# Patient Record
Sex: MALE | Race: WHITE | Employment: OTHER
[De-identification: names, ages, dates, MRNs, and addresses within clinical notes are randomized per-mention and may not be internally consistent; named-entity substitution may affect disease eponyms.]

---

## 2017-02-23 PROBLEM — I50.42 CHRONIC COMBINED SYSTOLIC AND DIASTOLIC CONGESTIVE HEART FAILURE (HCC): Chronic | Status: ACTIVE | Noted: 2017-02-23

## 2017-02-23 PROBLEM — E16.2 HYPOGLYCEMIA: Status: ACTIVE | Noted: 2017-02-23

## 2017-05-11 PROBLEM — J96.20 ACUTE ON CHRONIC RESPIRATORY FAILURE (HCC): Status: ACTIVE | Noted: 2017-05-11

## 2017-05-12 PROBLEM — I50.43 ACUTE ON CHRONIC COMBINED SYSTOLIC AND DIASTOLIC CHF (CONGESTIVE HEART FAILURE) (HCC): Chronic | Status: ACTIVE | Noted: 2017-02-23

## 2017-06-08 PROBLEM — E16.2 HYPOGLYCEMIA: Status: RESOLVED | Noted: 2017-02-23 | Resolved: 2017-06-08

## 2017-08-26 PROBLEM — Z45.02 ICD (IMPLANTABLE CARDIOVERTER-DEFIBRILLATOR) DISCHARGE: Status: ACTIVE | Noted: 2017-08-26

## 2017-11-20 ENCOUNTER — TELEPHONE (OUTPATIENT)
Dept: CASE MANAGEMENT | Age: 77
End: 2017-11-20

## 2017-11-20 NOTE — LETTER
CT Lung Screening/ Lung Nodule Program  [address] [place] [address]. [place], [address]       TO:   Rosa Dutta MD    FAX:   [phone]        FROM:   Chacha Hanks, [address] [facility] Screening [facility]    DATE:   11/20/2017    PHONE:  [phone]    FAX:    [phone]      Comments:  Incidental Pulmonary Nodule Notification    RE:  Sabi Shane  1940        The information contained in this fax message is intended only for Personal and Confidential use of the designated recipient(s) names above. This information is to be handled as Privileged and Confidential. If the reader of this message is not the intended recipient, you are hereby notified that you have received this document in error, and that any review, dissemination, distribution, or copying of this message is strictly prohibited. If you receive this communication in error, please notify us immediately at the above number and return the original message to us by mail. Thank you. Member of [facility]. Medical Imaging Services                              11/20/2017           Rosa Dutta, [address] [address]  Fax: [phone]      Dear  Rosa Dutta MD :                                      Your patient, Sabi Shane (1940) had an imaging study (11/13/17) that noted an incidental pulmonary nodule/s. Follow up was recommended according to [place] for Management of Pulmonary Nodules If this patient is not currently active within your practice, please contact us immediately so we can update records. This patient will not be monitored by our incidental pulmonary nodule program this letter serves as a notification only. If you have any additional questions or concerns regarding our Lung Nodule Program or our CT Lung Cancer Screening Program,  please don't hesitate to call.             Sincerely, Pulmonary Nodule Program  [address]:  [phone]  F:  [phone]                            Medical Imaging Services            11/20/2017      Federico Mortimer  [address]  [address] [facility]              Dear Paddy Beltran,    [facility] records indicate that over the past year you had an imaging study/studies done while a patient at a [facility] facility. Because your health is our primary concern, we want to make sure we have the correct primary care physician on file. We currently have Ginna Ziegler MD noted as your primary care physician. If this information is not accurate, please call [phone] and provide us with the correct information. If this information is correct, please make sure you have discussed your visit with your physician and understand all results and any follow up recommendations that may or may not be needed now or in the future.            Sincerely,    [address]:  [phone]  F:  [phone]

## 2018-01-01 ENCOUNTER — OFFICE VISIT (OUTPATIENT)
Dept: NON INVASIVE DIAGNOSTICS | Age: 78
End: 2018-01-01
Payer: MEDICARE

## 2018-01-01 ENCOUNTER — APPOINTMENT (OUTPATIENT)
Dept: CT IMAGING | Age: 78
DRG: 871 | End: 2018-01-01
Payer: MEDICARE

## 2018-01-01 ENCOUNTER — TELEPHONE (OUTPATIENT)
Dept: NON INVASIVE DIAGNOSTICS | Age: 78
End: 2018-01-01

## 2018-01-01 ENCOUNTER — APPOINTMENT (OUTPATIENT)
Dept: ULTRASOUND IMAGING | Age: 78
DRG: 871 | End: 2018-01-01
Payer: MEDICARE

## 2018-01-01 ENCOUNTER — HOSPITAL ENCOUNTER (OUTPATIENT)
Age: 78
Setting detail: OUTPATIENT SURGERY
Discharge: HOME | DRG: 982 | End: 2018-11-09
Attending: UROLOGY | Admitting: UROLOGY
Payer: MEDICARE

## 2018-01-01 ENCOUNTER — HOSPITAL ENCOUNTER (INPATIENT)
Age: 78
LOS: 6 days | Discharge: HOME HEALTH | DRG: 687 | End: 2018-11-20
Attending: EMERGENCY MEDICINE | Admitting: INTERNAL MEDICINE
Payer: MEDICARE

## 2018-01-01 ENCOUNTER — ANESTHESIA (OUTPATIENT)
Dept: OPERATING ROOM | Age: 78
DRG: 982 | End: 2018-01-01
Payer: MEDICARE

## 2018-01-01 ENCOUNTER — HOSPITAL ENCOUNTER (INPATIENT)
Age: 78
LOS: 9 days | Discharge: NURSING, REHAB OR LONG-TERM CARE | DRG: 813 | End: 2018-10-12
Attending: EMERGENCY MEDICINE | Admitting: INTERNAL MEDICINE
Payer: MEDICARE

## 2018-01-01 ENCOUNTER — APPOINTMENT (OUTPATIENT)
Dept: GENERAL RADIOLOGY | Age: 78
DRG: 687 | End: 2018-01-01
Payer: MEDICARE

## 2018-01-01 ENCOUNTER — APPOINTMENT (OUTPATIENT)
Dept: GENERAL RADIOLOGY | Age: 78
DRG: 813 | End: 2018-01-01
Payer: MEDICARE

## 2018-01-01 ENCOUNTER — HOSPITAL ENCOUNTER (OUTPATIENT)
Age: 78
Setting detail: OUTPATIENT SURGERY
Discharge: HOME | End: 2018-06-08
Attending: UROLOGY | Admitting: UROLOGY
Payer: MEDICARE

## 2018-01-01 ENCOUNTER — TELEPHONE (OUTPATIENT)
Dept: OTHER | Facility: CLINIC | Age: 78
End: 2018-01-01

## 2018-01-01 ENCOUNTER — APPOINTMENT (OUTPATIENT)
Dept: GENERAL RADIOLOGY | Age: 78
DRG: 871 | End: 2018-01-01
Payer: MEDICARE

## 2018-01-01 ENCOUNTER — OFFICE VISIT (OUTPATIENT)
Dept: CARDIOLOGY CLINIC | Age: 78
End: 2018-01-01
Payer: MEDICARE

## 2018-01-01 ENCOUNTER — APPOINTMENT (OUTPATIENT)
Dept: GENERAL RADIOLOGY | Age: 78
DRG: 982 | End: 2018-01-01
Payer: MEDICARE

## 2018-01-01 ENCOUNTER — HOSPITAL ENCOUNTER (INPATIENT)
Age: 78
LOS: 6 days | Discharge: NURSING, REHAB OR LONG-TERM CARE | DRG: 871 | End: 2018-12-11
Attending: EMERGENCY MEDICINE | Admitting: INTERNAL MEDICINE
Payer: MEDICARE

## 2018-01-01 ENCOUNTER — APPOINTMENT (OUTPATIENT)
Dept: GENERAL RADIOLOGY | Age: 78
DRG: 689 | End: 2018-01-01
Payer: MEDICARE

## 2018-01-01 ENCOUNTER — APPOINTMENT (OUTPATIENT)
Dept: CT IMAGING | Age: 78
DRG: 813 | End: 2018-01-01
Payer: MEDICARE

## 2018-01-01 ENCOUNTER — HOSPITAL ENCOUNTER (INPATIENT)
Age: 78
LOS: 2 days | Discharge: HOME HEALTH | DRG: 982 | End: 2018-11-12
Attending: EMERGENCY MEDICINE | Admitting: INTERNAL MEDICINE
Payer: MEDICARE

## 2018-01-01 ENCOUNTER — HOSPITAL ENCOUNTER (OUTPATIENT)
Dept: GENERAL RADIOLOGY | Age: 78
Discharge: HOME | DRG: 982 | End: 2018-11-11
Attending: UROLOGY
Payer: MEDICARE

## 2018-01-01 ENCOUNTER — TELEPHONE (OUTPATIENT)
Dept: ADMINISTRATIVE | Age: 78
End: 2018-01-01

## 2018-01-01 ENCOUNTER — HOSPITAL ENCOUNTER (INPATIENT)
Age: 78
LOS: 3 days | Discharge: HOME | DRG: 683 | End: 2018-04-24
Attending: EMERGENCY MEDICINE | Admitting: INTERNAL MEDICINE
Payer: MEDICARE

## 2018-01-01 ENCOUNTER — HOSPITAL ENCOUNTER (EMERGENCY)
Age: 78
Discharge: OTHER INSTITUTION | End: 2018-12-31
Attending: EMERGENCY MEDICINE
Payer: MEDICARE

## 2018-01-01 ENCOUNTER — APPOINTMENT (OUTPATIENT)
Dept: GENERAL RADIOLOGY | Age: 78
DRG: 291 | End: 2018-01-01
Payer: MEDICARE

## 2018-01-01 ENCOUNTER — ANESTHESIA (OUTPATIENT)
Dept: OPERATING ROOM | Age: 78
End: 2018-01-01
Payer: MEDICARE

## 2018-01-01 ENCOUNTER — NURSE ONLY (OUTPATIENT)
Dept: NON INVASIVE DIAGNOSTICS | Age: 78
End: 2018-01-01
Payer: MEDICARE

## 2018-01-01 ENCOUNTER — HOSPITAL ENCOUNTER (INPATIENT)
Age: 78
LOS: 6 days | Discharge: NURSING, REHAB OR LONG-TERM CARE | DRG: 291 | End: 2018-12-29
Attending: EMERGENCY MEDICINE | Admitting: INTERNAL MEDICINE
Payer: MEDICARE

## 2018-01-01 ENCOUNTER — APPOINTMENT (OUTPATIENT)
Dept: GENERAL RADIOLOGY | Age: 78
DRG: 193 | End: 2018-01-01
Payer: MEDICARE

## 2018-01-01 ENCOUNTER — HOSPITAL ENCOUNTER (INPATIENT)
Age: 78
LOS: 5 days | Discharge: NURSING, REHAB OR LONG-TERM CARE | DRG: 689 | End: 2018-11-28
Attending: EMERGENCY MEDICINE | Admitting: INTERNAL MEDICINE
Payer: MEDICARE

## 2018-01-01 ENCOUNTER — ANESTHESIA EVENT (OUTPATIENT)
Dept: OPERATING ROOM | Age: 78
End: 2018-01-01
Payer: MEDICARE

## 2018-01-01 ENCOUNTER — HOSPITAL ENCOUNTER (INPATIENT)
Age: 78
LOS: 4 days | Discharge: HOME HEALTH | DRG: 699 | End: 2018-06-20
Attending: EMERGENCY MEDICINE | Admitting: UROLOGY
Payer: MEDICARE

## 2018-01-01 ENCOUNTER — APPOINTMENT (OUTPATIENT)
Dept: ULTRASOUND IMAGING | Age: 78
DRG: 813 | End: 2018-01-01
Payer: MEDICARE

## 2018-01-01 ENCOUNTER — HOSPITAL ENCOUNTER (INPATIENT)
Age: 78
LOS: 3 days | Discharge: HOME | DRG: 193 | End: 2018-04-16
Attending: EMERGENCY MEDICINE | Admitting: INTERNAL MEDICINE
Payer: MEDICARE

## 2018-01-01 ENCOUNTER — APPOINTMENT (OUTPATIENT)
Dept: GENERAL RADIOLOGY | Age: 78
End: 2018-01-01
Payer: MEDICARE

## 2018-01-01 ENCOUNTER — ANESTHESIA EVENT (OUTPATIENT)
Dept: OPERATING ROOM | Age: 78
DRG: 982 | End: 2018-01-01
Payer: MEDICARE

## 2018-01-01 ENCOUNTER — HOSPITAL ENCOUNTER (OUTPATIENT)
Dept: GENERAL RADIOLOGY | Age: 78
Discharge: HOME | End: 2018-04-11
Payer: MEDICARE

## 2018-01-01 ENCOUNTER — HOSPITAL ENCOUNTER (EMERGENCY)
Age: 78
Discharge: HOME | End: 2018-04-28
Attending: EMERGENCY MEDICINE
Payer: MEDICARE

## 2018-01-01 ENCOUNTER — HOSPITAL ENCOUNTER (OUTPATIENT)
Age: 78
Discharge: HOME | End: 2018-04-11
Payer: MEDICARE

## 2018-01-01 ENCOUNTER — APPOINTMENT (OUTPATIENT)
Dept: GENERAL RADIOLOGY | Age: 78
End: 2018-01-01
Attending: UROLOGY
Payer: MEDICARE

## 2018-01-01 ENCOUNTER — APPOINTMENT (OUTPATIENT)
Dept: CT IMAGING | Age: 78
DRG: 683 | End: 2018-01-01
Payer: MEDICARE

## 2018-01-01 ENCOUNTER — HOSPITAL ENCOUNTER (OUTPATIENT)
Age: 78
Discharge: HOME | End: 2018-07-19
Payer: MEDICARE

## 2018-01-01 ENCOUNTER — HOSPITAL ENCOUNTER (OUTPATIENT)
Age: 78
Discharge: HOME | End: 2018-05-09
Payer: MEDICARE

## 2018-01-01 VITALS
OXYGEN SATURATION: 96 % | HEIGHT: 69 IN | HEART RATE: 76 BPM | BODY MASS INDEX: 34.07 KG/M2 | TEMPERATURE: 98.7 F | RESPIRATION RATE: 16 BRPM | WEIGHT: 230 LBS | SYSTOLIC BLOOD PRESSURE: 118 MMHG | DIASTOLIC BLOOD PRESSURE: 62 MMHG

## 2018-01-01 VITALS
BODY MASS INDEX: 29.62 KG/M2 | RESPIRATION RATE: 18 BRPM | HEART RATE: 76 BPM | HEIGHT: 69 IN | TEMPERATURE: 97.7 F | WEIGHT: 200 LBS | SYSTOLIC BLOOD PRESSURE: 147 MMHG | OXYGEN SATURATION: 96 % | DIASTOLIC BLOOD PRESSURE: 70 MMHG

## 2018-01-01 VITALS
TEMPERATURE: 97.7 F | HEART RATE: 75 BPM | SYSTOLIC BLOOD PRESSURE: 113 MMHG | DIASTOLIC BLOOD PRESSURE: 60 MMHG | OXYGEN SATURATION: 99 % | WEIGHT: 215 LBS | RESPIRATION RATE: 16 BRPM | HEIGHT: 69 IN | BODY MASS INDEX: 31.84 KG/M2

## 2018-01-01 VITALS
BODY MASS INDEX: 28.73 KG/M2 | HEART RATE: 75 BPM | SYSTOLIC BLOOD PRESSURE: 122 MMHG | WEIGHT: 194 LBS | HEIGHT: 69 IN | DIASTOLIC BLOOD PRESSURE: 64 MMHG | RESPIRATION RATE: 16 BRPM

## 2018-01-01 VITALS
BODY MASS INDEX: 32.92 KG/M2 | RESPIRATION RATE: 20 BRPM | HEIGHT: 65 IN | SYSTOLIC BLOOD PRESSURE: 132 MMHG | DIASTOLIC BLOOD PRESSURE: 76 MMHG | WEIGHT: 197.6 LBS | HEART RATE: 75 BPM

## 2018-01-01 VITALS
TEMPERATURE: 97.9 F | WEIGHT: 193.2 LBS | HEART RATE: 74 BPM | OXYGEN SATURATION: 98 % | BODY MASS INDEX: 25.6 KG/M2 | RESPIRATION RATE: 16 BRPM | DIASTOLIC BLOOD PRESSURE: 70 MMHG | HEIGHT: 73 IN | SYSTOLIC BLOOD PRESSURE: 140 MMHG

## 2018-01-01 VITALS
TEMPERATURE: 97.6 F | HEART RATE: 74 BPM | HEIGHT: 73 IN | RESPIRATION RATE: 18 BRPM | WEIGHT: 188 LBS | DIASTOLIC BLOOD PRESSURE: 61 MMHG | OXYGEN SATURATION: 100 % | SYSTOLIC BLOOD PRESSURE: 129 MMHG | BODY MASS INDEX: 24.92 KG/M2

## 2018-01-01 VITALS
HEIGHT: 69 IN | RESPIRATION RATE: 18 BRPM | TEMPERATURE: 97.8 F | HEART RATE: 75 BPM | DIASTOLIC BLOOD PRESSURE: 64 MMHG | OXYGEN SATURATION: 98 % | SYSTOLIC BLOOD PRESSURE: 140 MMHG | WEIGHT: 192.1 LBS | BODY MASS INDEX: 28.45 KG/M2

## 2018-01-01 VITALS — OXYGEN SATURATION: 99 % | DIASTOLIC BLOOD PRESSURE: 58 MMHG | SYSTOLIC BLOOD PRESSURE: 118 MMHG

## 2018-01-01 VITALS
HEIGHT: 69 IN | RESPIRATION RATE: 20 BRPM | BODY MASS INDEX: 33.27 KG/M2 | HEART RATE: 75 BPM | OXYGEN SATURATION: 95 % | DIASTOLIC BLOOD PRESSURE: 88 MMHG | TEMPERATURE: 98.6 F | WEIGHT: 224.6 LBS | SYSTOLIC BLOOD PRESSURE: 132 MMHG

## 2018-01-01 VITALS
DIASTOLIC BLOOD PRESSURE: 71 MMHG | RESPIRATION RATE: 18 BRPM | SYSTOLIC BLOOD PRESSURE: 156 MMHG | HEIGHT: 68 IN | OXYGEN SATURATION: 95 % | TEMPERATURE: 96.9 F | BODY MASS INDEX: 29.46 KG/M2 | WEIGHT: 194.38 LBS | HEART RATE: 75 BPM

## 2018-01-01 VITALS
BODY MASS INDEX: 32.58 KG/M2 | HEART RATE: 75 BPM | RESPIRATION RATE: 18 BRPM | OXYGEN SATURATION: 91 % | HEIGHT: 69 IN | SYSTOLIC BLOOD PRESSURE: 117 MMHG | TEMPERATURE: 98.3 F | DIASTOLIC BLOOD PRESSURE: 58 MMHG | WEIGHT: 220 LBS

## 2018-01-01 VITALS
HEART RATE: 75 BPM | WEIGHT: 203.6 LBS | TEMPERATURE: 98.3 F | BODY MASS INDEX: 30.16 KG/M2 | RESPIRATION RATE: 16 BRPM | OXYGEN SATURATION: 94 % | DIASTOLIC BLOOD PRESSURE: 82 MMHG | SYSTOLIC BLOOD PRESSURE: 133 MMHG | HEIGHT: 69 IN

## 2018-01-01 VITALS
DIASTOLIC BLOOD PRESSURE: 48 MMHG | RESPIRATION RATE: 20 BRPM | HEART RATE: 76 BPM | SYSTOLIC BLOOD PRESSURE: 90 MMHG | HEIGHT: 69 IN | WEIGHT: 215 LBS | BODY MASS INDEX: 31.84 KG/M2

## 2018-01-01 VITALS
HEIGHT: 69 IN | DIASTOLIC BLOOD PRESSURE: 44 MMHG | RESPIRATION RATE: 28 BRPM | BODY MASS INDEX: 29.03 KG/M2 | HEART RATE: 80 BPM | OXYGEN SATURATION: 91 % | SYSTOLIC BLOOD PRESSURE: 87 MMHG | TEMPERATURE: 97.3 F | WEIGHT: 196 LBS

## 2018-01-01 VITALS
SYSTOLIC BLOOD PRESSURE: 114 MMHG | WEIGHT: 211 LBS | RESPIRATION RATE: 20 BRPM | OXYGEN SATURATION: 100 % | BODY MASS INDEX: 31.25 KG/M2 | DIASTOLIC BLOOD PRESSURE: 57 MMHG | HEART RATE: 79 BPM | HEIGHT: 69 IN | TEMPERATURE: 97.8 F

## 2018-01-01 VITALS
SYSTOLIC BLOOD PRESSURE: 122 MMHG | DIASTOLIC BLOOD PRESSURE: 60 MMHG | WEIGHT: 198.8 LBS | BODY MASS INDEX: 30.13 KG/M2 | RESPIRATION RATE: 16 BRPM | HEIGHT: 68 IN | HEART RATE: 76 BPM

## 2018-01-01 VITALS
TEMPERATURE: 97.6 F | DIASTOLIC BLOOD PRESSURE: 65 MMHG | WEIGHT: 192.38 LBS | HEART RATE: 75 BPM | HEIGHT: 69 IN | OXYGEN SATURATION: 100 % | SYSTOLIC BLOOD PRESSURE: 139 MMHG | RESPIRATION RATE: 18 BRPM | BODY MASS INDEX: 28.49 KG/M2

## 2018-01-01 VITALS
RESPIRATION RATE: 16 BRPM | HEART RATE: 75 BPM | HEIGHT: 69 IN | DIASTOLIC BLOOD PRESSURE: 72 MMHG | BODY MASS INDEX: 32.64 KG/M2 | SYSTOLIC BLOOD PRESSURE: 118 MMHG | WEIGHT: 220.4 LBS

## 2018-01-01 VITALS — DIASTOLIC BLOOD PRESSURE: 55 MMHG | OXYGEN SATURATION: 99 % | SYSTOLIC BLOOD PRESSURE: 97 MMHG

## 2018-01-01 DIAGNOSIS — R31.9 URINARY TRACT INFECTION WITH HEMATURIA, SITE UNSPECIFIED: Primary | ICD-10-CM

## 2018-01-01 DIAGNOSIS — T83.9XXA PROBLEM WITH FOLEY CATHETER, INITIAL ENCOUNTER (HCC): Primary | ICD-10-CM

## 2018-01-01 DIAGNOSIS — R42 DIZZINESS: ICD-10-CM

## 2018-01-01 DIAGNOSIS — R53.1 GENERAL WEAKNESS: ICD-10-CM

## 2018-01-01 DIAGNOSIS — N17.9 AKI (ACUTE KIDNEY INJURY) (HCC): ICD-10-CM

## 2018-01-01 DIAGNOSIS — A41.9 SEPTIC SHOCK (HCC): ICD-10-CM

## 2018-01-01 DIAGNOSIS — I50.42 CHRONIC COMBINED SYSTOLIC AND DIASTOLIC CONGESTIVE HEART FAILURE (HCC): Chronic | ICD-10-CM

## 2018-01-01 DIAGNOSIS — J96.01 ACUTE RESPIRATORY FAILURE WITH HYPOXIA (HCC): Primary | ICD-10-CM

## 2018-01-01 DIAGNOSIS — I50.23 ACUTE ON CHRONIC SYSTOLIC HEART FAILURE (HCC): ICD-10-CM

## 2018-01-01 DIAGNOSIS — Z95.810 BIVENTRICULAR IMPLANTABLE CARDIOVERTER-DEFIBRILLATOR IN SITU: Chronic | ICD-10-CM

## 2018-01-01 DIAGNOSIS — A41.9 SEPTIC SHOCK (HCC): Primary | ICD-10-CM

## 2018-01-01 DIAGNOSIS — J44.1 COPD EXACERBATION (HCC): ICD-10-CM

## 2018-01-01 DIAGNOSIS — T83.511A URINARY TRACT INFECTION ASSOCIATED WITH INDWELLING URETHRAL CATHETER, INITIAL ENCOUNTER (HCC): Primary | ICD-10-CM

## 2018-01-01 DIAGNOSIS — J18.9 PNEUMONIA DUE TO INFECTIOUS ORGANISM, UNSPECIFIED LATERALITY, UNSPECIFIED PART OF LUNG: ICD-10-CM

## 2018-01-01 DIAGNOSIS — T83.511A URINARY TRACT INFECTION ASSOCIATED WITH INDWELLING URETHRAL CATHETER, INITIAL ENCOUNTER (HCC): ICD-10-CM

## 2018-01-01 DIAGNOSIS — N39.0 URINARY TRACT INFECTION WITH HEMATURIA, SITE UNSPECIFIED: Primary | ICD-10-CM

## 2018-01-01 DIAGNOSIS — I50.9 ACUTE CONGESTIVE HEART FAILURE, UNSPECIFIED HEART FAILURE TYPE (HCC): ICD-10-CM

## 2018-01-01 DIAGNOSIS — R79.1 SUPRATHERAPEUTIC INR: ICD-10-CM

## 2018-01-01 DIAGNOSIS — E78.5 HYPERLIPIDEMIA WITH TARGET LDL LESS THAN 100: Chronic | ICD-10-CM

## 2018-01-01 DIAGNOSIS — N32.89 BLADDER SPASMS: ICD-10-CM

## 2018-01-01 DIAGNOSIS — I25.5 ISCHEMIC CARDIOMYOPATHY: ICD-10-CM

## 2018-01-01 DIAGNOSIS — I48.21 PERMANENT ATRIAL FIBRILLATION (HCC): Primary | Chronic | ICD-10-CM

## 2018-01-01 DIAGNOSIS — R06.00 DYSPNEA AND RESPIRATORY ABNORMALITIES: Primary | ICD-10-CM

## 2018-01-01 DIAGNOSIS — C67.9 MALIGNANT NEOPLASM OF URINARY BLADDER, UNSPECIFIED SITE (HCC): ICD-10-CM

## 2018-01-01 DIAGNOSIS — N30.01 ACUTE CYSTITIS WITH HEMATURIA: Primary | ICD-10-CM

## 2018-01-01 DIAGNOSIS — R41.82 ALTERED MENTAL STATUS, UNSPECIFIED ALTERED MENTAL STATUS TYPE: ICD-10-CM

## 2018-01-01 DIAGNOSIS — I48.21 PERMANENT ATRIAL FIBRILLATION (HCC): Chronic | ICD-10-CM

## 2018-01-01 DIAGNOSIS — E16.2 HYPOGLYCEMIA: ICD-10-CM

## 2018-01-01 DIAGNOSIS — R06.89 DYSPNEA AND RESPIRATORY ABNORMALITIES: Primary | ICD-10-CM

## 2018-01-01 DIAGNOSIS — J10.1 INFLUENZA B: Primary | ICD-10-CM

## 2018-01-01 DIAGNOSIS — B37.49 CANDIDA UTI: ICD-10-CM

## 2018-01-01 DIAGNOSIS — I25.10 CORONARY ARTERY DISEASE INVOLVING NATIVE CORONARY ARTERY OF NATIVE HEART WITHOUT ANGINA PECTORIS: Chronic | ICD-10-CM

## 2018-01-01 DIAGNOSIS — I10 HTN (HYPERTENSION), BENIGN: Chronic | ICD-10-CM

## 2018-01-01 DIAGNOSIS — Z95.810 BIVENTRICULAR IMPLANTABLE CARDIOVERTER-DEFIBRILLATOR IN SITU: Primary | Chronic | ICD-10-CM

## 2018-01-01 DIAGNOSIS — R53.1 GENERALIZED WEAKNESS: ICD-10-CM

## 2018-01-01 DIAGNOSIS — R31.0 GROSS HEMATURIA: ICD-10-CM

## 2018-01-01 DIAGNOSIS — R65.21 SEPTIC SHOCK (HCC): Primary | ICD-10-CM

## 2018-01-01 DIAGNOSIS — R31.0 GROSS HEMATURIA: Primary | ICD-10-CM

## 2018-01-01 DIAGNOSIS — D72.829 LEUKOCYTOSIS, UNSPECIFIED TYPE: ICD-10-CM

## 2018-01-01 DIAGNOSIS — J96.01 ACUTE RESPIRATORY FAILURE WITH HYPOXIA (HCC): ICD-10-CM

## 2018-01-01 DIAGNOSIS — J18.9 PNEUMONIA DUE TO ORGANISM: ICD-10-CM

## 2018-01-01 DIAGNOSIS — R31.9 HEMATURIA, UNSPECIFIED TYPE: ICD-10-CM

## 2018-01-01 DIAGNOSIS — G89.18 POST-OP PAIN: Primary | ICD-10-CM

## 2018-01-01 DIAGNOSIS — R31.9 HEMATURIA, UNSPECIFIED TYPE: Primary | ICD-10-CM

## 2018-01-01 DIAGNOSIS — N39.0 URINARY TRACT INFECTION WITHOUT HEMATURIA, SITE UNSPECIFIED: ICD-10-CM

## 2018-01-01 DIAGNOSIS — N17.9 AKI (ACUTE KIDNEY INJURY) (HCC): Primary | ICD-10-CM

## 2018-01-01 DIAGNOSIS — R52 PAIN: ICD-10-CM

## 2018-01-01 DIAGNOSIS — I25.10 CORONARY ARTERY DISEASE INVOLVING NATIVE CORONARY ARTERY OF NATIVE HEART WITHOUT ANGINA PECTORIS: Primary | Chronic | ICD-10-CM

## 2018-01-01 DIAGNOSIS — E87.5 HYPERKALEMIA: ICD-10-CM

## 2018-01-01 DIAGNOSIS — N18.30 CKD (CHRONIC KIDNEY DISEASE) STAGE 3, GFR 30-59 ML/MIN (HCC): Chronic | ICD-10-CM

## 2018-01-01 DIAGNOSIS — N39.0 URINARY TRACT INFECTION ASSOCIATED WITH INDWELLING URETHRAL CATHETER, INITIAL ENCOUNTER (HCC): ICD-10-CM

## 2018-01-01 DIAGNOSIS — R65.21 SEPTIC SHOCK (HCC): ICD-10-CM

## 2018-01-01 DIAGNOSIS — N39.0 URINARY TRACT INFECTION ASSOCIATED WITH INDWELLING URETHRAL CATHETER, INITIAL ENCOUNTER (HCC): Primary | ICD-10-CM

## 2018-01-01 DIAGNOSIS — R09.02 HYPOXIA: ICD-10-CM

## 2018-01-01 LAB
AADO2: 369.1 MMHG
ABO/RH: NORMAL
ALBUMIN SERPL-MCNC: 2.6 G/DL (ref 3.5–5.2)
ALBUMIN SERPL-MCNC: 2.7 G/DL (ref 3.5–5.2)
ALBUMIN SERPL-MCNC: 2.8 G/DL (ref 3.5–5.2)
ALBUMIN SERPL-MCNC: 2.8 G/DL (ref 3.5–5.2)
ALBUMIN SERPL-MCNC: 2.9 G/DL (ref 3.5–5.2)
ALBUMIN SERPL-MCNC: 3.2 G/DL (ref 3.5–5.2)
ALBUMIN SERPL-MCNC: 3.3 G/DL (ref 3.5–5.2)
ALBUMIN SERPL-MCNC: 3.3 G/DL (ref 3.5–5.2)
ALBUMIN SERPL-MCNC: 3.4 G/DL (ref 3.5–5.2)
ALP BLD-CCNC: 60 U/L (ref 40–129)
ALP BLD-CCNC: 61 U/L (ref 40–129)
ALP BLD-CCNC: 63 U/L (ref 40–129)
ALP BLD-CCNC: 66 U/L (ref 40–129)
ALP BLD-CCNC: 67 U/L (ref 40–129)
ALP BLD-CCNC: 74 U/L (ref 40–129)
ALP BLD-CCNC: 75 U/L (ref 40–129)
ALP BLD-CCNC: 76 U/L (ref 40–129)
ALP BLD-CCNC: 77 U/L (ref 40–129)
ALP BLD-CCNC: 88 U/L (ref 40–129)
ALP BLD-CCNC: 90 U/L (ref 40–129)
ALT SERPL-CCNC: 11 U/L (ref 0–40)
ALT SERPL-CCNC: 11 U/L (ref 0–40)
ALT SERPL-CCNC: 12 U/L (ref 0–40)
ALT SERPL-CCNC: 12 U/L (ref 0–40)
ALT SERPL-CCNC: 14 U/L (ref 0–40)
ALT SERPL-CCNC: 15 U/L (ref 0–40)
ALT SERPL-CCNC: 18 U/L (ref 0–40)
ALT SERPL-CCNC: 22 U/L (ref 0–40)
ALT SERPL-CCNC: 23 U/L (ref 0–40)
ALT SERPL-CCNC: 30 U/L (ref 0–40)
ALT SERPL-CCNC: 8 U/L (ref 0–40)
ANION GAP SERPL CALCULATED.3IONS-SCNC: 10 MMOL/L (ref 7–16)
ANION GAP SERPL CALCULATED.3IONS-SCNC: 11 MMOL/L (ref 7–16)
ANION GAP SERPL CALCULATED.3IONS-SCNC: 12 MMOL/L (ref 7–16)
ANION GAP SERPL CALCULATED.3IONS-SCNC: 13 MMOL/L (ref 7–16)
ANION GAP SERPL CALCULATED.3IONS-SCNC: 14 MMOL/L (ref 7–16)
ANION GAP SERPL CALCULATED.3IONS-SCNC: 14 MMOL/L (ref 7–16)
ANION GAP SERPL CALCULATED.3IONS-SCNC: 15 MMOL/L (ref 7–16)
ANION GAP SERPL CALCULATED.3IONS-SCNC: 16 MMOL/L (ref 7–16)
ANION GAP SERPL CALCULATED.3IONS-SCNC: 7 MMOL/L (ref 7–16)
ANION GAP SERPL CALCULATED.3IONS-SCNC: 8 MMOL/L (ref 7–16)
ANION GAP SERPL CALCULATED.3IONS-SCNC: 9 MMOL/L (ref 7–16)
ANISOCYTOSIS: ABNORMAL
ANTIBODY SCREEN: NORMAL
APTT: 30.5 SEC (ref 24.5–35.1)
APTT: 32.3 SEC (ref 24.5–35.1)
AST SERPL-CCNC: 13 U/L (ref 0–39)
AST SERPL-CCNC: 13 U/L (ref 0–39)
AST SERPL-CCNC: 15 U/L (ref 0–39)
AST SERPL-CCNC: 18 U/L (ref 0–39)
AST SERPL-CCNC: 20 U/L (ref 0–39)
AST SERPL-CCNC: 25 U/L (ref 0–39)
AST SERPL-CCNC: 29 U/L (ref 0–39)
AST SERPL-CCNC: 33 U/L (ref 0–39)
AST SERPL-CCNC: 35 U/L (ref 0–39)
B.E.: -0.6 MMOL/L (ref -3–3)
B.E.: -1.4 MMOL/L (ref -3–3)
B.E.: 1.2 MMOL/L (ref -3–3)
B.E.: 5.7 MMOL/L (ref -3–3)
B.E.: 9.3 MMOL/L (ref -3–3)
BACTERIA: ABNORMAL /HPF
BACTERIA: NORMAL /HPF
BASOPHILIC STIPPLING: ABNORMAL
BASOPHILS ABSOLUTE: 0.02 E9/L (ref 0–0.2)
BASOPHILS ABSOLUTE: 0.03 E9/L (ref 0–0.2)
BASOPHILS ABSOLUTE: 0.04 E9/L (ref 0–0.2)
BASOPHILS ABSOLUTE: 0.05 E9/L (ref 0–0.2)
BASOPHILS ABSOLUTE: 0.06 E9/L (ref 0–0.2)
BASOPHILS ABSOLUTE: 0.07 E9/L (ref 0–0.2)
BASOPHILS ABSOLUTE: 0.07 E9/L (ref 0–0.2)
BASOPHILS RELATIVE PERCENT: 0.1 % (ref 0–2)
BASOPHILS RELATIVE PERCENT: 0.1 % (ref 0–2)
BASOPHILS RELATIVE PERCENT: 0.2 % (ref 0–2)
BASOPHILS RELATIVE PERCENT: 0.3 % (ref 0–2)
BASOPHILS RELATIVE PERCENT: 0.4 % (ref 0–2)
BASOPHILS RELATIVE PERCENT: 0.5 % (ref 0–2)
BILIRUB SERPL-MCNC: 0.2 MG/DL (ref 0–1.2)
BILIRUB SERPL-MCNC: 0.3 MG/DL (ref 0–1.2)
BILIRUB SERPL-MCNC: 0.4 MG/DL (ref 0–1.2)
BILIRUB SERPL-MCNC: 0.4 MG/DL (ref 0–1.2)
BILIRUB SERPL-MCNC: <0.2 MG/DL (ref 0–1.2)
BILIRUB SERPL-MCNC: <0.2 MG/DL (ref 0–1.2)
BILIRUBIN URINE: NEGATIVE
BLOOD BANK DISPENSE STATUS: NORMAL
BLOOD BANK PRODUCT CODE: NORMAL
BLOOD CULTURE, ROUTINE: NORMAL
BLOOD, URINE: ABNORMAL
BPU ID: NORMAL
BUN BLDV-MCNC: 17 MG/DL (ref 8–23)
BUN BLDV-MCNC: 19 MG/DL (ref 8–23)
BUN BLDV-MCNC: 20 MG/DL (ref 8–23)
BUN BLDV-MCNC: 21 MG/DL (ref 8–23)
BUN BLDV-MCNC: 21 MG/DL (ref 8–23)
BUN BLDV-MCNC: 22 MG/DL (ref 8–23)
BUN BLDV-MCNC: 22 MG/DL (ref 8–23)
BUN BLDV-MCNC: 24 MG/DL (ref 8–23)
BUN BLDV-MCNC: 24 MG/DL (ref 8–23)
BUN BLDV-MCNC: 25 MG/DL (ref 8–23)
BUN BLDV-MCNC: 26 MG/DL (ref 8–23)
BUN BLDV-MCNC: 27 MG/DL (ref 8–23)
BUN BLDV-MCNC: 28 MG/DL (ref 8–23)
BUN BLDV-MCNC: 28 MG/DL (ref 8–23)
BUN BLDV-MCNC: 29 MG/DL (ref 8–23)
BUN BLDV-MCNC: 30 MG/DL (ref 8–23)
BUN BLDV-MCNC: 31 MG/DL (ref 8–23)
BUN BLDV-MCNC: 31 MG/DL (ref 8–23)
BUN BLDV-MCNC: 32 MG/DL (ref 8–23)
BUN BLDV-MCNC: 33 MG/DL (ref 8–23)
BUN BLDV-MCNC: 34 MG/DL (ref 8–23)
BUN BLDV-MCNC: 35 MG/DL (ref 8–23)
BUN BLDV-MCNC: 36 MG/DL (ref 8–23)
BUN BLDV-MCNC: 37 MG/DL (ref 8–23)
BUN BLDV-MCNC: 37 MG/DL (ref 8–23)
BUN BLDV-MCNC: 38 MG/DL (ref 8–23)
BUN BLDV-MCNC: 40 MG/DL (ref 8–23)
BUN BLDV-MCNC: 41 MG/DL (ref 8–23)
BUN BLDV-MCNC: 41 MG/DL (ref 8–23)
BUN BLDV-MCNC: 42 MG/DL (ref 8–23)
BUN BLDV-MCNC: 43 MG/DL (ref 8–23)
BUN BLDV-MCNC: 46 MG/DL (ref 8–23)
BUN BLDV-MCNC: 48 MG/DL (ref 8–23)
BUN BLDV-MCNC: 54 MG/DL (ref 8–23)
BUN BLDV-MCNC: 55 MG/DL (ref 8–23)
CALCIUM SERPL-MCNC: 8.1 MG/DL (ref 8.6–10.2)
CALCIUM SERPL-MCNC: 8.1 MG/DL (ref 8.6–10.2)
CALCIUM SERPL-MCNC: 8.2 MG/DL (ref 8.6–10.2)
CALCIUM SERPL-MCNC: 8.2 MG/DL (ref 8.6–10.2)
CALCIUM SERPL-MCNC: 8.3 MG/DL (ref 8.6–10.2)
CALCIUM SERPL-MCNC: 8.4 MG/DL (ref 8.6–10.2)
CALCIUM SERPL-MCNC: 8.5 MG/DL (ref 8.6–10.2)
CALCIUM SERPL-MCNC: 8.6 MG/DL (ref 8.6–10.2)
CALCIUM SERPL-MCNC: 8.7 MG/DL (ref 8.6–10.2)
CALCIUM SERPL-MCNC: 8.8 MG/DL (ref 8.6–10.2)
CALCIUM SERPL-MCNC: 8.9 MG/DL (ref 8.6–10.2)
CALCIUM SERPL-MCNC: 8.9 MG/DL (ref 8.6–10.2)
CALCIUM SERPL-MCNC: 9 MG/DL (ref 8.6–10.2)
CALCIUM SERPL-MCNC: 9.1 MG/DL (ref 8.6–10.2)
CALCIUM SERPL-MCNC: 9.3 MG/DL (ref 8.6–10.2)
CALCIUM SERPL-MCNC: 9.3 MG/DL (ref 8.6–10.2)
CALCIUM SERPL-MCNC: 9.6 MG/DL (ref 8.6–10.2)
CHLORIDE BLD-SCNC: 100 MMOL/L (ref 98–107)
CHLORIDE BLD-SCNC: 101 MMOL/L (ref 98–107)
CHLORIDE BLD-SCNC: 102 MMOL/L (ref 98–107)
CHLORIDE BLD-SCNC: 103 MMOL/L (ref 98–107)
CHLORIDE BLD-SCNC: 104 MMOL/L (ref 98–107)
CHLORIDE BLD-SCNC: 105 MMOL/L (ref 98–107)
CHLORIDE BLD-SCNC: 107 MMOL/L (ref 98–107)
CHLORIDE BLD-SCNC: 108 MMOL/L (ref 98–107)
CHLORIDE BLD-SCNC: 108 MMOL/L (ref 98–107)
CHLORIDE BLD-SCNC: 109 MMOL/L (ref 98–107)
CHLORIDE BLD-SCNC: 112 MMOL/L (ref 98–107)
CHLORIDE BLD-SCNC: 114 MMOL/L (ref 98–107)
CHLORIDE BLD-SCNC: 96 MMOL/L (ref 98–107)
CHLORIDE BLD-SCNC: 97 MMOL/L (ref 98–107)
CHLORIDE BLD-SCNC: 98 MMOL/L (ref 98–107)
CHLORIDE BLD-SCNC: 98 MMOL/L (ref 98–107)
CHLORIDE BLD-SCNC: 99 MMOL/L (ref 98–107)
CHLORIDE URINE RANDOM: <20 MMOL/L
CHP ED QC CHECK: NORMAL
CHP ED QC CHECK: YES
CLARITY: ABNORMAL
CO2: 22 MMOL/L (ref 22–29)
CO2: 23 MMOL/L (ref 22–29)
CO2: 24 MMOL/L (ref 22–29)
CO2: 25 MMOL/L (ref 22–29)
CO2: 26 MMOL/L (ref 22–29)
CO2: 27 MMOL/L (ref 22–29)
CO2: 28 MMOL/L (ref 22–29)
CO2: 29 MMOL/L (ref 22–29)
CO2: 30 MMOL/L (ref 22–29)
CO2: 31 MMOL/L (ref 22–29)
CO2: 32 MMOL/L (ref 22–29)
CO2: 32 MMOL/L (ref 22–29)
CO2: 35 MMOL/L (ref 22–29)
CO2: 36 MMOL/L (ref 22–29)
COHB: 0.4 % (ref 0–1.5)
COHB: 0.6 % (ref 0–1.5)
COHB: 0.7 % (ref 0–1.5)
COHB: 0.9 % (ref 0–1.5)
COHB: 0.9 % (ref 0–1.5)
COLOR: ABNORMAL
COLOR: YELLOW
CREAT SERPL-MCNC: 1.1 MG/DL (ref 0.7–1.2)
CREAT SERPL-MCNC: 1.2 MG/DL (ref 0.7–1.2)
CREAT SERPL-MCNC: 1.3 MG/DL (ref 0.7–1.2)
CREAT SERPL-MCNC: 1.4 MG/DL (ref 0.7–1.2)
CREAT SERPL-MCNC: 1.5 MG/DL (ref 0.7–1.2)
CREAT SERPL-MCNC: 1.6 MG/DL (ref 0.7–1.2)
CREAT SERPL-MCNC: 1.7 MG/DL (ref 0.7–1.2)
CREAT SERPL-MCNC: 1.8 MG/DL (ref 0.7–1.2)
CREAT SERPL-MCNC: 1.8 MG/DL (ref 0.7–1.2)
CREAT SERPL-MCNC: 1.9 MG/DL (ref 0.7–1.2)
CREAT SERPL-MCNC: 2 MG/DL (ref 0.7–1.2)
CREAT SERPL-MCNC: 2.1 MG/DL (ref 0.7–1.2)
CREAT SERPL-MCNC: 2.2 MG/DL (ref 0.7–1.2)
CREAT SERPL-MCNC: 2.4 MG/DL (ref 0.7–1.2)
CREAT SERPL-MCNC: 3 MG/DL (ref 0.7–1.2)
CREATININE URINE: 76 MG/DL (ref 40–278)
CRITICAL: ABNORMAL
CULTURE, BLOOD 2: NORMAL
D DIMER: 779 NG/ML DDU
DATE ANALYZED: ABNORMAL
DATE OF COLLECTION: ABNORMAL
DESCRIPTION BLOOD BANK: NORMAL
EKG ATRIAL RATE: 65 BPM
EKG ATRIAL RATE: 69 BPM
EKG ATRIAL RATE: 70 BPM
EKG ATRIAL RATE: 72 BPM
EKG ATRIAL RATE: 74 BPM
EKG ATRIAL RATE: 75 BPM
EKG ATRIAL RATE: 76 BPM
EKG ATRIAL RATE: 78 BPM
EKG ATRIAL RATE: 79 BPM
EKG P AXIS: 28 DEGREES
EKG P-R INTERVAL: 134 MS
EKG P-R INTERVAL: 136 MS
EKG Q-T INTERVAL: 406 MS
EKG Q-T INTERVAL: 434 MS
EKG Q-T INTERVAL: 436 MS
EKG Q-T INTERVAL: 454 MS
EKG Q-T INTERVAL: 462 MS
EKG Q-T INTERVAL: 464 MS
EKG Q-T INTERVAL: 466 MS
EKG Q-T INTERVAL: 484 MS
EKG Q-T INTERVAL: 486 MS
EKG QRS DURATION: 152 MS
EKG QRS DURATION: 152 MS
EKG QRS DURATION: 156 MS
EKG QRS DURATION: 160 MS
EKG QRS DURATION: 164 MS
EKG QRS DURATION: 166 MS
EKG QRS DURATION: 168 MS
EKG QRS DURATION: 170 MS
EKG QRS DURATION: 176 MS
EKG QTC CALCULATION (BAZETT): 453 MS
EKG QTC CALCULATION (BAZETT): 486 MS
EKG QTC CALCULATION (BAZETT): 488 MS
EKG QTC CALCULATION (BAZETT): 506 MS
EKG QTC CALCULATION (BAZETT): 515 MS
EKG QTC CALCULATION (BAZETT): 518 MS
EKG QTC CALCULATION (BAZETT): 524 MS
EKG QTC CALCULATION (BAZETT): 540 MS
EKG QTC CALCULATION (BAZETT): 542 MS
EKG R AXIS: -124 DEGREES
EKG R AXIS: -126 DEGREES
EKG R AXIS: -132 DEGREES
EKG R AXIS: -139 DEGREES
EKG R AXIS: -143 DEGREES
EKG R AXIS: -144 DEGREES
EKG R AXIS: -146 DEGREES
EKG R AXIS: -158 DEGREES
EKG R AXIS: -161 DEGREES
EKG T AXIS: 43 DEGREES
EKG T AXIS: 44 DEGREES
EKG T AXIS: 49 DEGREES
EKG T AXIS: 59 DEGREES
EKG T AXIS: 59 DEGREES
EKG T AXIS: 68 DEGREES
EKG T AXIS: 69 DEGREES
EKG T AXIS: 76 DEGREES
EKG T AXIS: 91 DEGREES
EKG VENTRICULAR RATE: 75 BPM
EKG VENTRICULAR RATE: 76 BPM
EKG VENTRICULAR RATE: 76 BPM
EOSINOPHILS ABSOLUTE: 0.26 E9/L (ref 0.05–0.5)
EOSINOPHILS ABSOLUTE: 0.37 E9/L (ref 0.05–0.5)
EOSINOPHILS ABSOLUTE: 0.4 E9/L (ref 0.05–0.5)
EOSINOPHILS ABSOLUTE: 0.49 E9/L (ref 0.05–0.5)
EOSINOPHILS ABSOLUTE: 0.49 E9/L (ref 0.05–0.5)
EOSINOPHILS ABSOLUTE: 0.5 E9/L (ref 0.05–0.5)
EOSINOPHILS ABSOLUTE: 0.63 E9/L (ref 0.05–0.5)
EOSINOPHILS ABSOLUTE: 0.71 E9/L (ref 0.05–0.5)
EOSINOPHILS ABSOLUTE: 0.71 E9/L (ref 0.05–0.5)
EOSINOPHILS ABSOLUTE: 0.74 E9/L (ref 0.05–0.5)
EOSINOPHILS ABSOLUTE: 0.75 E9/L (ref 0.05–0.5)
EOSINOPHILS ABSOLUTE: 0.9 E9/L (ref 0.05–0.5)
EOSINOPHILS ABSOLUTE: 0.97 E9/L (ref 0.05–0.5)
EOSINOPHILS ABSOLUTE: 1.01 E9/L (ref 0.05–0.5)
EOSINOPHILS ABSOLUTE: 1.17 E9/L (ref 0.05–0.5)
EOSINOPHILS ABSOLUTE: 1.33 E9/L (ref 0.05–0.5)
EOSINOPHILS ABSOLUTE: 1.36 E9/L (ref 0.05–0.5)
EOSINOPHILS ABSOLUTE: 1.39 E9/L (ref 0.05–0.5)
EOSINOPHILS ABSOLUTE: 1.44 E9/L (ref 0.05–0.5)
EOSINOPHILS ABSOLUTE: 1.48 E9/L (ref 0.05–0.5)
EOSINOPHILS ABSOLUTE: 1.64 E9/L (ref 0.05–0.5)
EOSINOPHILS ABSOLUTE: 1.68 E9/L (ref 0.05–0.5)
EOSINOPHILS ABSOLUTE: 1.76 E9/L (ref 0.05–0.5)
EOSINOPHILS ABSOLUTE: 1.86 E9/L (ref 0.05–0.5)
EOSINOPHILS ABSOLUTE: 1.93 E9/L (ref 0.05–0.5)
EOSINOPHILS ABSOLUTE: 2.08 E9/L (ref 0.05–0.5)
EOSINOPHILS ABSOLUTE: 2.44 E9/L (ref 0.05–0.5)
EOSINOPHILS RELATIVE PERCENT: 1.7 % (ref 0–6)
EOSINOPHILS RELATIVE PERCENT: 10.3 % (ref 0–6)
EOSINOPHILS RELATIVE PERCENT: 10.5 % (ref 0–6)
EOSINOPHILS RELATIVE PERCENT: 10.8 % (ref 0–6)
EOSINOPHILS RELATIVE PERCENT: 11.1 % (ref 0–6)
EOSINOPHILS RELATIVE PERCENT: 11.2 % (ref 0–6)
EOSINOPHILS RELATIVE PERCENT: 12.2 % (ref 0–6)
EOSINOPHILS RELATIVE PERCENT: 12.2 % (ref 0–6)
EOSINOPHILS RELATIVE PERCENT: 12.7 % (ref 0–6)
EOSINOPHILS RELATIVE PERCENT: 12.9 % (ref 0–6)
EOSINOPHILS RELATIVE PERCENT: 13.7 % (ref 0–6)
EOSINOPHILS RELATIVE PERCENT: 15.8 % (ref 0–6)
EOSINOPHILS RELATIVE PERCENT: 16.5 % (ref 0–6)
EOSINOPHILS RELATIVE PERCENT: 2.1 % (ref 0–6)
EOSINOPHILS RELATIVE PERCENT: 2.5 % (ref 0–6)
EOSINOPHILS RELATIVE PERCENT: 2.8 % (ref 0–6)
EOSINOPHILS RELATIVE PERCENT: 3.1 % (ref 0–6)
EOSINOPHILS RELATIVE PERCENT: 3.2 % (ref 0–6)
EOSINOPHILS RELATIVE PERCENT: 3.3 % (ref 0–6)
EOSINOPHILS RELATIVE PERCENT: 4.8 % (ref 0–6)
EOSINOPHILS RELATIVE PERCENT: 6 % (ref 0–6)
EOSINOPHILS RELATIVE PERCENT: 6.1 % (ref 0–6)
EOSINOPHILS RELATIVE PERCENT: 6.2 % (ref 0–6)
EOSINOPHILS RELATIVE PERCENT: 7 % (ref 0–6)
EOSINOPHILS RELATIVE PERCENT: 7 % (ref 0–6)
EOSINOPHILS RELATIVE PERCENT: 7.1 % (ref 0–6)
EOSINOPHILS RELATIVE PERCENT: 8 % (ref 0–6)
EPITHELIAL CELLS, UA: ABNORMAL /HPF
EPITHELIAL CELLS, UA: NORMAL /HPF
FILM ARRAY ADENOVIRUS: NORMAL
FILM ARRAY ADENOVIRUS: NORMAL
FILM ARRAY BORDETELLA PERTUSSIS: NORMAL
FILM ARRAY BORDETELLA PERTUSSIS: NORMAL
FILM ARRAY CHLAMYDOPHILIA PNEUMONIAE: NORMAL
FILM ARRAY CHLAMYDOPHILIA PNEUMONIAE: NORMAL
FILM ARRAY CORONAVIRUS 229E: NORMAL
FILM ARRAY CORONAVIRUS 229E: NORMAL
FILM ARRAY CORONAVIRUS HKU1: NORMAL
FILM ARRAY CORONAVIRUS HKU1: NORMAL
FILM ARRAY CORONAVIRUS NL63: NORMAL
FILM ARRAY CORONAVIRUS NL63: NORMAL
FILM ARRAY CORONAVIRUS OC43: NORMAL
FILM ARRAY CORONAVIRUS OC43: NORMAL
FILM ARRAY INFLUENZA A VIRUS 09H1: NORMAL
FILM ARRAY INFLUENZA A VIRUS 09H1: NORMAL
FILM ARRAY INFLUENZA A VIRUS H1: NORMAL
FILM ARRAY INFLUENZA A VIRUS H1: NORMAL
FILM ARRAY INFLUENZA A VIRUS H3: NORMAL
FILM ARRAY INFLUENZA A VIRUS H3: NORMAL
FILM ARRAY INFLUENZA A VIRUS: NORMAL
FILM ARRAY INFLUENZA A VIRUS: NORMAL
FILM ARRAY INFLUENZA B: NORMAL
FILM ARRAY INFLUENZA B: NORMAL
FILM ARRAY METAPNEUMOVIRUS: NORMAL
FILM ARRAY METAPNEUMOVIRUS: NORMAL
FILM ARRAY MYCOPLASMA PNEUMONIAE: NORMAL
FILM ARRAY MYCOPLASMA PNEUMONIAE: NORMAL
FILM ARRAY PARAINFLUENZA VIRUS 1: NORMAL
FILM ARRAY PARAINFLUENZA VIRUS 1: NORMAL
FILM ARRAY PARAINFLUENZA VIRUS 2: NORMAL
FILM ARRAY PARAINFLUENZA VIRUS 2: NORMAL
FILM ARRAY PARAINFLUENZA VIRUS 3: NORMAL
FILM ARRAY PARAINFLUENZA VIRUS 3: NORMAL
FILM ARRAY PARAINFLUENZA VIRUS 4: NORMAL
FILM ARRAY PARAINFLUENZA VIRUS 4: NORMAL
FILM ARRAY RESPIRATORY SYNCITIAL VIRUS: NORMAL
FILM ARRAY RESPIRATORY SYNCITIAL VIRUS: NORMAL
FILM ARRAY RHINOVIRUS/ENTEROVIRUS: NORMAL
FILM ARRAY RHINOVIRUS/ENTEROVIRUS: NORMAL
FIO2: 100 %
GFR AFRICAN AMERICAN: 25
GFR AFRICAN AMERICAN: 32
GFR AFRICAN AMERICAN: 35
GFR AFRICAN AMERICAN: 37
GFR AFRICAN AMERICAN: 39
GFR AFRICAN AMERICAN: 42
GFR AFRICAN AMERICAN: 44
GFR AFRICAN AMERICAN: 45
GFR AFRICAN AMERICAN: 47
GFR AFRICAN AMERICAN: 47
GFR AFRICAN AMERICAN: 48
GFR AFRICAN AMERICAN: 51
GFR AFRICAN AMERICAN: 55
GFR AFRICAN AMERICAN: 59
GFR AFRICAN AMERICAN: >60
GFR NON-AFRICAN AMERICAN: 20 ML/MIN/1.73
GFR NON-AFRICAN AMERICAN: 26 ML/MIN/1.73
GFR NON-AFRICAN AMERICAN: 29 ML/MIN/1.73
GFR NON-AFRICAN AMERICAN: 31 ML/MIN/1.73
GFR NON-AFRICAN AMERICAN: 33 ML/MIN/1.73
GFR NON-AFRICAN AMERICAN: 35 ML/MIN/1.73
GFR NON-AFRICAN AMERICAN: 37 ML/MIN/1.73
GFR NON-AFRICAN AMERICAN: 37 ML/MIN/1.73
GFR NON-AFRICAN AMERICAN: 39 ML/MIN/1.73
GFR NON-AFRICAN AMERICAN: 42 ML/MIN/1.73
GFR NON-AFRICAN AMERICAN: 45 ML/MIN/1.73
GFR NON-AFRICAN AMERICAN: 49 ML/MIN/1.73
GFR NON-AFRICAN AMERICAN: 53 ML/MIN/1.73
GFR NON-AFRICAN AMERICAN: 54 ML/MIN/1.73
GFR NON-AFRICAN AMERICAN: 59 ML/MIN/1.73
GFR NON-AFRICAN AMERICAN: >60 ML/MIN/1.73
GLUCOSE BLD-MCNC: 101 MG/DL (ref 74–99)
GLUCOSE BLD-MCNC: 114 MG/DL (ref 74–109)
GLUCOSE BLD-MCNC: 115 MG/DL (ref 74–99)
GLUCOSE BLD-MCNC: 116 MG/DL (ref 74–99)
GLUCOSE BLD-MCNC: 118 MG/DL (ref 74–99)
GLUCOSE BLD-MCNC: 123 MG/DL (ref 74–99)
GLUCOSE BLD-MCNC: 123 MG/DL (ref 74–99)
GLUCOSE BLD-MCNC: 127 MG/DL (ref 74–99)
GLUCOSE BLD-MCNC: 132 MG/DL (ref 74–109)
GLUCOSE BLD-MCNC: 138 MG/DL (ref 74–109)
GLUCOSE BLD-MCNC: 140 MG/DL (ref 74–109)
GLUCOSE BLD-MCNC: 141 MG/DL (ref 74–99)
GLUCOSE BLD-MCNC: 148 MG/DL (ref 74–99)
GLUCOSE BLD-MCNC: 151 MG/DL (ref 74–99)
GLUCOSE BLD-MCNC: 168 MG/DL
GLUCOSE BLD-MCNC: 171 MG/DL (ref 74–99)
GLUCOSE BLD-MCNC: 172 MG/DL (ref 74–99)
GLUCOSE BLD-MCNC: 173 MG/DL (ref 74–109)
GLUCOSE BLD-MCNC: 175 MG/DL (ref 74–99)
GLUCOSE BLD-MCNC: 182 MG/DL (ref 74–99)
GLUCOSE BLD-MCNC: 188 MG/DL (ref 74–99)
GLUCOSE BLD-MCNC: 191 MG/DL (ref 74–99)
GLUCOSE BLD-MCNC: 196 MG/DL (ref 74–109)
GLUCOSE BLD-MCNC: 196 MG/DL (ref 74–99)
GLUCOSE BLD-MCNC: 220 MG/DL (ref 74–99)
GLUCOSE BLD-MCNC: 230 MG/DL (ref 74–109)
GLUCOSE BLD-MCNC: 231 MG/DL (ref 74–99)
GLUCOSE BLD-MCNC: 237 MG/DL (ref 74–99)
GLUCOSE BLD-MCNC: 241 MG/DL (ref 74–99)
GLUCOSE BLD-MCNC: 248 MG/DL (ref 74–99)
GLUCOSE BLD-MCNC: 250 MG/DL (ref 74–99)
GLUCOSE BLD-MCNC: 250 MG/DL (ref 74–99)
GLUCOSE BLD-MCNC: 258 MG/DL (ref 74–99)
GLUCOSE BLD-MCNC: 275 MG/DL (ref 74–99)
GLUCOSE BLD-MCNC: 287 MG/DL (ref 74–99)
GLUCOSE BLD-MCNC: 304 MG/DL (ref 74–109)
GLUCOSE BLD-MCNC: 309 MG/DL (ref 74–99)
GLUCOSE BLD-MCNC: 37 MG/DL (ref 74–99)
GLUCOSE BLD-MCNC: 45 MG/DL (ref 74–109)
GLUCOSE BLD-MCNC: 51 MG/DL (ref 74–109)
GLUCOSE BLD-MCNC: 54 MG/DL (ref 74–109)
GLUCOSE BLD-MCNC: 56 MG/DL (ref 74–109)
GLUCOSE BLD-MCNC: 64 MG/DL (ref 74–99)
GLUCOSE BLD-MCNC: 68 MG/DL (ref 74–99)
GLUCOSE BLD-MCNC: 68 MG/DL (ref 74–99)
GLUCOSE BLD-MCNC: 75 MG/DL (ref 74–109)
GLUCOSE BLD-MCNC: 75 MG/DL (ref 74–109)
GLUCOSE BLD-MCNC: 76 MG/DL (ref 74–109)
GLUCOSE BLD-MCNC: 81 MG/DL (ref 74–109)
GLUCOSE BLD-MCNC: 83 MG/DL (ref 74–99)
GLUCOSE BLD-MCNC: 85 MG/DL
GLUCOSE BLD-MCNC: 85 MG/DL (ref 74–109)
GLUCOSE BLD-MCNC: 86 MG/DL (ref 74–109)
GLUCOSE BLD-MCNC: 88 MG/DL (ref 74–99)
GLUCOSE BLD-MCNC: 97 MG/DL (ref 74–109)
GLUCOSE URINE: 100 MG/DL
GLUCOSE URINE: NEGATIVE MG/DL
HBA1C MFR BLD: 6.4 % (ref 4–5.6)
HBA1C MFR BLD: 7.1 % (ref 4.8–5.9)
HBA1C MFR BLD: 7.1 % (ref 4–5.6)
HBA1C MFR BLD: 7.2 % (ref 4–5.6)
HCO3: 21.8 MMOL/L (ref 22–26)
HCO3: 22.5 MMOL/L (ref 22–26)
HCO3: 24.9 MMOL/L (ref 22–26)
HCO3: 29.6 MMOL/L (ref 22–26)
HCO3: 35.8 MMOL/L (ref 22–26)
HCT VFR BLD CALC: 23.6 % (ref 37–54)
HCT VFR BLD CALC: 26.1 % (ref 37–54)
HCT VFR BLD CALC: 26.4 % (ref 37–54)
HCT VFR BLD CALC: 26.7 % (ref 37–54)
HCT VFR BLD CALC: 26.8 % (ref 37–54)
HCT VFR BLD CALC: 26.9 % (ref 37–54)
HCT VFR BLD CALC: 27.8 % (ref 37–54)
HCT VFR BLD CALC: 27.9 % (ref 37–54)
HCT VFR BLD CALC: 27.9 % (ref 37–54)
HCT VFR BLD CALC: 28 % (ref 37–54)
HCT VFR BLD CALC: 28.1 % (ref 37–54)
HCT VFR BLD CALC: 28.6 % (ref 37–54)
HCT VFR BLD CALC: 29.1 % (ref 37–54)
HCT VFR BLD CALC: 29.5 % (ref 37–54)
HCT VFR BLD CALC: 30.2 % (ref 37–54)
HCT VFR BLD CALC: 30.3 % (ref 37–54)
HCT VFR BLD CALC: 30.6 % (ref 37–54)
HCT VFR BLD CALC: 30.7 % (ref 37–54)
HCT VFR BLD CALC: 30.8 % (ref 37–54)
HCT VFR BLD CALC: 30.9 % (ref 37–54)
HCT VFR BLD CALC: 30.9 % (ref 37–54)
HCT VFR BLD CALC: 31.8 % (ref 37–54)
HCT VFR BLD CALC: 32 % (ref 37–54)
HCT VFR BLD CALC: 32.3 % (ref 37–54)
HCT VFR BLD CALC: 33.1 % (ref 37–54)
HCT VFR BLD CALC: 33.4 % (ref 37–54)
HCT VFR BLD CALC: 33.6 % (ref 37–54)
HCT VFR BLD CALC: 33.6 % (ref 37–54)
HCT VFR BLD CALC: 34.4 % (ref 37–54)
HCT VFR BLD CALC: 34.8 % (ref 37–54)
HCT VFR BLD CALC: 34.8 % (ref 37–54)
HCT VFR BLD CALC: 35 % (ref 37–54)
HCT VFR BLD CALC: 35.5 % (ref 37–54)
HCT VFR BLD CALC: 36.1 % (ref 37–54)
HCT VFR BLD CALC: 36.4 % (ref 37–54)
HCT VFR BLD CALC: 37.5 % (ref 37–54)
HCT VFR BLD CALC: 38.9 % (ref 37–54)
HCT VFR BLD CALC: 39.7 % (ref 37–54)
HCT VFR BLD CALC: 42.9 % (ref 37–54)
HEMOGLOBIN: 10 G/DL (ref 12.5–16.5)
HEMOGLOBIN: 10 G/DL (ref 12.5–16.5)
HEMOGLOBIN: 10.1 G/DL (ref 12.5–16.5)
HEMOGLOBIN: 10.2 G/DL (ref 12.5–16.5)
HEMOGLOBIN: 10.3 G/DL (ref 12.5–16.5)
HEMOGLOBIN: 10.4 G/DL (ref 12.5–16.5)
HEMOGLOBIN: 10.5 G/DL (ref 12.5–16.5)
HEMOGLOBIN: 10.6 G/DL (ref 12.5–16.5)
HEMOGLOBIN: 10.7 G/DL (ref 12.5–16.5)
HEMOGLOBIN: 11.1 G/DL (ref 12.5–16.5)
HEMOGLOBIN: 12.3 G/DL (ref 12.5–16.5)
HEMOGLOBIN: 12.3 G/DL (ref 12.5–16.5)
HEMOGLOBIN: 13.3 G/DL (ref 12.5–16.5)
HEMOGLOBIN: 6.7 G/DL (ref 12.5–16.5)
HEMOGLOBIN: 7.1 G/DL (ref 12.5–16.5)
HEMOGLOBIN: 7.2 G/DL (ref 12.5–16.5)
HEMOGLOBIN: 7.7 G/DL (ref 12.5–16.5)
HEMOGLOBIN: 7.9 G/DL (ref 12.5–16.5)
HEMOGLOBIN: 7.9 G/DL (ref 12.5–16.5)
HEMOGLOBIN: 8 G/DL (ref 12.5–16.5)
HEMOGLOBIN: 8 G/DL (ref 12.5–16.5)
HEMOGLOBIN: 8.2 G/DL (ref 12.5–16.5)
HEMOGLOBIN: 8.3 G/DL (ref 12.5–16.5)
HEMOGLOBIN: 8.4 G/DL (ref 12.5–16.5)
HEMOGLOBIN: 8.5 G/DL (ref 12.5–16.5)
HEMOGLOBIN: 8.7 G/DL (ref 12.5–16.5)
HEMOGLOBIN: 8.7 G/DL (ref 12.5–16.5)
HEMOGLOBIN: 8.8 G/DL (ref 12.5–16.5)
HEMOGLOBIN: 8.9 G/DL (ref 12.5–16.5)
HEMOGLOBIN: 9 G/DL (ref 12.5–16.5)
HEMOGLOBIN: 9.3 G/DL (ref 12.5–16.5)
HEMOGLOBIN: 9.3 G/DL (ref 12.5–16.5)
HEMOGLOBIN: 9.4 G/DL (ref 12.5–16.5)
HEMOGLOBIN: 9.4 G/DL (ref 12.5–16.5)
HEMOGLOBIN: 9.5 G/DL (ref 12.5–16.5)
HEMOGLOBIN: 9.5 G/DL (ref 12.5–16.5)
HEMOGLOBIN: 9.6 G/DL (ref 12.5–16.5)
HEMOGLOBIN: 9.8 G/DL (ref 12.5–16.5)
HEMOGLOBIN: 9.9 G/DL (ref 12.5–16.5)
HHB: 0.3 % (ref 0–5)
HHB: 15.1 % (ref 0–5)
HHB: 2 % (ref 0–5)
HHB: 2.6 % (ref 0–5)
HHB: 5.3 % (ref 0–5)
HYPOCHROMIA: ABNORMAL
IMMATURE GRANULOCYTES #: 0.06 E9/L
IMMATURE GRANULOCYTES #: 0.06 E9/L
IMMATURE GRANULOCYTES #: 0.07 E9/L
IMMATURE GRANULOCYTES #: 0.08 E9/L
IMMATURE GRANULOCYTES #: 0.09 E9/L
IMMATURE GRANULOCYTES #: 0.09 E9/L
IMMATURE GRANULOCYTES #: 0.1 E9/L
IMMATURE GRANULOCYTES #: 0.11 E9/L
IMMATURE GRANULOCYTES #: 0.12 E9/L
IMMATURE GRANULOCYTES #: 0.13 E9/L
IMMATURE GRANULOCYTES #: 0.13 E9/L
IMMATURE GRANULOCYTES #: 0.14 E9/L
IMMATURE GRANULOCYTES #: 0.14 E9/L
IMMATURE GRANULOCYTES #: 0.22 E9/L
IMMATURE GRANULOCYTES #: 0.26 E9/L
IMMATURE GRANULOCYTES %: 0.5 % (ref 0–5)
IMMATURE GRANULOCYTES %: 0.6 % (ref 0–5)
IMMATURE GRANULOCYTES %: 0.7 % (ref 0–5)
IMMATURE GRANULOCYTES %: 0.8 % (ref 0–5)
IMMATURE GRANULOCYTES %: 0.9 % (ref 0–5)
IMMATURE GRANULOCYTES %: 1 % (ref 0–5)
IMMATURE GRANULOCYTES %: 1 % (ref 0–5)
INFLUENZA A BY PCR: NOT DETECTED
INFLUENZA A BY PCR: NOT DETECTED
INFLUENZA B BY PCR: DETECTED
INFLUENZA B BY PCR: NOT DETECTED
INR BLD: 1.1
INR BLD: 1.2
INR BLD: 1.3
INR BLD: 1.4
INR BLD: 1.5
INR BLD: 1.7
INR BLD: 1.8
INR BLD: 1.9
INR BLD: 1.9
INR BLD: 2
INR BLD: 2.1
INR BLD: 2.4
INR BLD: 2.6
INR BLD: 2.7
INR BLD: 2.7
INR BLD: 2.8
INR BLD: 3.2
INR BLD: 3.8
INR BLD: 3.9
INR BLD: 4
INR BLD: 4.4
INR BLD: 5.6
INR BLD: 8.1
INR BLD: 8.7
KETONES, URINE: NEGATIVE MG/DL
L. PNEUMOPHILA SEROGP 1 UR AG: NORMAL
LAB: ABNORMAL
LACTIC ACID, SEPSIS: 1.3 MMOL/L (ref 0.5–1.9)
LACTIC ACID, SEPSIS: 5.5 MMOL/L (ref 0.5–1.9)
LACTIC ACID: 0.9 MMOL/L (ref 0.5–2.2)
LACTIC ACID: 1.2 MMOL/L (ref 0.5–2.2)
LACTIC ACID: 1.4 MMOL/L (ref 0.5–2.2)
LACTIC ACID: 1.5 MMOL/L (ref 0.5–2.2)
LACTIC ACID: 1.6 MMOL/L (ref 0.5–2.2)
LACTIC ACID: 1.8 MMOL/L (ref 0.5–2.2)
LACTIC ACID: 2.8 MMOL/L (ref 0.5–2.2)
LEUKOCYTE ESTERASE, URINE: ABNORMAL
LYMPHOCYTES ABSOLUTE: 0.73 E9/L (ref 1.5–4)
LYMPHOCYTES ABSOLUTE: 0.83 E9/L (ref 1.5–4)
LYMPHOCYTES ABSOLUTE: 1.08 E9/L (ref 1.5–4)
LYMPHOCYTES ABSOLUTE: 1.1 E9/L (ref 1.5–4)
LYMPHOCYTES ABSOLUTE: 1.19 E9/L (ref 1.5–4)
LYMPHOCYTES ABSOLUTE: 1.24 E9/L (ref 1.5–4)
LYMPHOCYTES ABSOLUTE: 1.28 E9/L (ref 1.5–4)
LYMPHOCYTES ABSOLUTE: 1.31 E9/L (ref 1.5–4)
LYMPHOCYTES ABSOLUTE: 1.41 E9/L (ref 1.5–4)
LYMPHOCYTES ABSOLUTE: 1.44 E9/L (ref 1.5–4)
LYMPHOCYTES ABSOLUTE: 1.5 E9/L (ref 1.5–4)
LYMPHOCYTES ABSOLUTE: 1.51 E9/L (ref 1.5–4)
LYMPHOCYTES ABSOLUTE: 1.54 E9/L (ref 1.5–4)
LYMPHOCYTES ABSOLUTE: 1.6 E9/L (ref 1.5–4)
LYMPHOCYTES ABSOLUTE: 1.63 E9/L (ref 1.5–4)
LYMPHOCYTES ABSOLUTE: 1.64 E9/L (ref 1.5–4)
LYMPHOCYTES ABSOLUTE: 1.65 E9/L (ref 1.5–4)
LYMPHOCYTES ABSOLUTE: 1.73 E9/L (ref 1.5–4)
LYMPHOCYTES ABSOLUTE: 1.77 E9/L (ref 1.5–4)
LYMPHOCYTES ABSOLUTE: 1.84 E9/L (ref 1.5–4)
LYMPHOCYTES ABSOLUTE: 1.87 E9/L (ref 1.5–4)
LYMPHOCYTES ABSOLUTE: 1.88 E9/L (ref 1.5–4)
LYMPHOCYTES ABSOLUTE: 1.92 E9/L (ref 1.5–4)
LYMPHOCYTES ABSOLUTE: 1.96 E9/L (ref 1.5–4)
LYMPHOCYTES ABSOLUTE: 2.1 E9/L (ref 1.5–4)
LYMPHOCYTES ABSOLUTE: 2.15 E9/L (ref 1.5–4)
LYMPHOCYTES ABSOLUTE: 2.66 E9/L (ref 1.5–4)
LYMPHOCYTES RELATIVE PERCENT: 10.2 % (ref 20–42)
LYMPHOCYTES RELATIVE PERCENT: 10.7 % (ref 20–42)
LYMPHOCYTES RELATIVE PERCENT: 11.8 % (ref 20–42)
LYMPHOCYTES RELATIVE PERCENT: 12 % (ref 20–42)
LYMPHOCYTES RELATIVE PERCENT: 12.1 % (ref 20–42)
LYMPHOCYTES RELATIVE PERCENT: 12.2 % (ref 20–42)
LYMPHOCYTES RELATIVE PERCENT: 12.4 % (ref 20–42)
LYMPHOCYTES RELATIVE PERCENT: 12.7 % (ref 20–42)
LYMPHOCYTES RELATIVE PERCENT: 12.7 % (ref 20–42)
LYMPHOCYTES RELATIVE PERCENT: 12.8 % (ref 20–42)
LYMPHOCYTES RELATIVE PERCENT: 12.9 % (ref 20–42)
LYMPHOCYTES RELATIVE PERCENT: 13.4 % (ref 20–42)
LYMPHOCYTES RELATIVE PERCENT: 14.3 % (ref 20–42)
LYMPHOCYTES RELATIVE PERCENT: 14.7 % (ref 20–42)
LYMPHOCYTES RELATIVE PERCENT: 16.3 % (ref 20–42)
LYMPHOCYTES RELATIVE PERCENT: 16.3 % (ref 20–42)
LYMPHOCYTES RELATIVE PERCENT: 17 % (ref 20–42)
LYMPHOCYTES RELATIVE PERCENT: 20.3 % (ref 20–42)
LYMPHOCYTES RELATIVE PERCENT: 5 % (ref 20–42)
LYMPHOCYTES RELATIVE PERCENT: 5.1 % (ref 20–42)
LYMPHOCYTES RELATIVE PERCENT: 5.6 % (ref 20–42)
LYMPHOCYTES RELATIVE PERCENT: 6 % (ref 20–42)
LYMPHOCYTES RELATIVE PERCENT: 7.2 % (ref 20–42)
LYMPHOCYTES RELATIVE PERCENT: 8.1 % (ref 20–42)
LYMPHOCYTES RELATIVE PERCENT: 8.2 % (ref 20–42)
LYMPHOCYTES RELATIVE PERCENT: 8.9 % (ref 20–42)
LYMPHOCYTES RELATIVE PERCENT: 9.3 % (ref 20–42)
Lab: ABNORMAL
MAGNESIUM: 1.9 MG/DL (ref 1.6–2.6)
MAGNESIUM: 1.9 MG/DL (ref 1.6–2.6)
MAGNESIUM: 2 MG/DL (ref 1.6–2.6)
MAGNESIUM: 2.1 MG/DL (ref 1.6–2.6)
MAGNESIUM: 2.1 MG/DL (ref 1.6–2.6)
MAGNESIUM: 2.3 MG/DL (ref 1.6–2.6)
MAGNESIUM: 2.3 MG/DL (ref 1.6–2.6)
MCH RBC QN AUTO: 25.4 PG (ref 26–35)
MCH RBC QN AUTO: 25.4 PG (ref 26–35)
MCH RBC QN AUTO: 25.5 PG (ref 26–35)
MCH RBC QN AUTO: 25.6 PG (ref 26–35)
MCH RBC QN AUTO: 25.7 PG (ref 26–35)
MCH RBC QN AUTO: 25.8 PG (ref 26–35)
MCH RBC QN AUTO: 25.8 PG (ref 26–35)
MCH RBC QN AUTO: 25.9 PG (ref 26–35)
MCH RBC QN AUTO: 26 PG (ref 26–35)
MCH RBC QN AUTO: 26 PG (ref 26–35)
MCH RBC QN AUTO: 26.1 PG (ref 26–35)
MCH RBC QN AUTO: 26.1 PG (ref 26–35)
MCH RBC QN AUTO: 26.2 PG (ref 26–35)
MCH RBC QN AUTO: 26.2 PG (ref 26–35)
MCH RBC QN AUTO: 26.4 PG (ref 26–35)
MCH RBC QN AUTO: 26.4 PG (ref 26–35)
MCH RBC QN AUTO: 26.5 PG (ref 26–35)
MCH RBC QN AUTO: 26.5 PG (ref 26–35)
MCH RBC QN AUTO: 26.6 PG (ref 26–35)
MCH RBC QN AUTO: 26.6 PG (ref 26–35)
MCH RBC QN AUTO: 26.7 PG (ref 26–35)
MCH RBC QN AUTO: 26.8 PG (ref 26–35)
MCH RBC QN AUTO: 26.9 PG (ref 26–35)
MCH RBC QN AUTO: 27.4 PG (ref 26–35)
MCH RBC QN AUTO: 29.6 PG (ref 26–35)
MCH RBC QN AUTO: 29.6 PG (ref 26–35)
MCH RBC QN AUTO: 29.8 PG (ref 26–35)
MCH RBC QN AUTO: 29.9 PG (ref 26–35)
MCH RBC QN AUTO: 30 PG (ref 26–35)
MCH RBC QN AUTO: 30.1 PG (ref 26–35)
MCH RBC QN AUTO: 30.1 PG (ref 26–35)
MCH RBC QN AUTO: 30.3 PG (ref 26–35)
MCHC RBC AUTO-ENTMCNC: 28.1 % (ref 32–34.5)
MCHC RBC AUTO-ENTMCNC: 28.4 % (ref 32–34.5)
MCHC RBC AUTO-ENTMCNC: 28.5 % (ref 32–34.5)
MCHC RBC AUTO-ENTMCNC: 28.5 % (ref 32–34.5)
MCHC RBC AUTO-ENTMCNC: 28.6 % (ref 32–34.5)
MCHC RBC AUTO-ENTMCNC: 28.6 % (ref 32–34.5)
MCHC RBC AUTO-ENTMCNC: 29 % (ref 32–34.5)
MCHC RBC AUTO-ENTMCNC: 29 % (ref 32–34.5)
MCHC RBC AUTO-ENTMCNC: 29.1 % (ref 32–34.5)
MCHC RBC AUTO-ENTMCNC: 29.2 % (ref 32–34.5)
MCHC RBC AUTO-ENTMCNC: 29.3 % (ref 32–34.5)
MCHC RBC AUTO-ENTMCNC: 29.3 % (ref 32–34.5)
MCHC RBC AUTO-ENTMCNC: 29.4 % (ref 32–34.5)
MCHC RBC AUTO-ENTMCNC: 29.4 % (ref 32–34.5)
MCHC RBC AUTO-ENTMCNC: 29.5 % (ref 32–34.5)
MCHC RBC AUTO-ENTMCNC: 29.5 % (ref 32–34.5)
MCHC RBC AUTO-ENTMCNC: 29.7 % (ref 32–34.5)
MCHC RBC AUTO-ENTMCNC: 29.8 % (ref 32–34.5)
MCHC RBC AUTO-ENTMCNC: 29.9 % (ref 32–34.5)
MCHC RBC AUTO-ENTMCNC: 29.9 % (ref 32–34.5)
MCHC RBC AUTO-ENTMCNC: 30.1 % (ref 32–34.5)
MCHC RBC AUTO-ENTMCNC: 30.2 % (ref 32–34.5)
MCHC RBC AUTO-ENTMCNC: 30.4 % (ref 32–34.5)
MCHC RBC AUTO-ENTMCNC: 30.5 % (ref 32–34.5)
MCHC RBC AUTO-ENTMCNC: 30.5 % (ref 32–34.5)
MCHC RBC AUTO-ENTMCNC: 30.7 % (ref 32–34.5)
MCHC RBC AUTO-ENTMCNC: 31 % (ref 32–34.5)
MCHC RBC AUTO-ENTMCNC: 31 % (ref 32–34.5)
MCHC RBC AUTO-ENTMCNC: 31.3 % (ref 32–34.5)
MCHC RBC AUTO-ENTMCNC: 31.3 % (ref 32–34.5)
MCHC RBC AUTO-ENTMCNC: 31.4 % (ref 32–34.5)
MCHC RBC AUTO-ENTMCNC: 31.5 % (ref 32–34.5)
MCHC RBC AUTO-ENTMCNC: 31.6 % (ref 32–34.5)
MCHC RBC AUTO-ENTMCNC: 31.7 % (ref 32–34.5)
MCV RBC AUTO: 86 FL (ref 80–99.9)
MCV RBC AUTO: 86.3 FL (ref 80–99.9)
MCV RBC AUTO: 87.3 FL (ref 80–99.9)
MCV RBC AUTO: 87.3 FL (ref 80–99.9)
MCV RBC AUTO: 87.4 FL (ref 80–99.9)
MCV RBC AUTO: 88 FL (ref 80–99.9)
MCV RBC AUTO: 88.2 FL (ref 80–99.9)
MCV RBC AUTO: 88.3 FL (ref 80–99.9)
MCV RBC AUTO: 88.3 FL (ref 80–99.9)
MCV RBC AUTO: 88.5 FL (ref 80–99.9)
MCV RBC AUTO: 88.6 FL (ref 80–99.9)
MCV RBC AUTO: 88.6 FL (ref 80–99.9)
MCV RBC AUTO: 88.8 FL (ref 80–99.9)
MCV RBC AUTO: 88.9 FL (ref 80–99.9)
MCV RBC AUTO: 89 FL (ref 80–99.9)
MCV RBC AUTO: 89.1 FL (ref 80–99.9)
MCV RBC AUTO: 89.1 FL (ref 80–99.9)
MCV RBC AUTO: 89.4 FL (ref 80–99.9)
MCV RBC AUTO: 89.8 FL (ref 80–99.9)
MCV RBC AUTO: 90 FL (ref 80–99.9)
MCV RBC AUTO: 90.5 FL (ref 80–99.9)
MCV RBC AUTO: 90.6 FL (ref 80–99.9)
MCV RBC AUTO: 91 FL (ref 80–99.9)
MCV RBC AUTO: 91.2 FL (ref 80–99.9)
MCV RBC AUTO: 91.7 FL (ref 80–99.9)
MCV RBC AUTO: 94.4 FL (ref 80–99.9)
MCV RBC AUTO: 94.8 FL (ref 80–99.9)
MCV RBC AUTO: 95.2 FL (ref 80–99.9)
MCV RBC AUTO: 95.7 FL (ref 80–99.9)
MCV RBC AUTO: 95.8 FL (ref 80–99.9)
MCV RBC AUTO: 96.2 FL (ref 80–99.9)
MCV RBC AUTO: 96.6 FL (ref 80–99.9)
MCV RBC AUTO: 97.1 FL (ref 80–99.9)
METER GLUCOSE: 100 MG/DL (ref 70–110)
METER GLUCOSE: 100 MG/DL (ref 74–99)
METER GLUCOSE: 101 MG/DL (ref 70–110)
METER GLUCOSE: 102 MG/DL (ref 70–110)
METER GLUCOSE: 102 MG/DL (ref 74–99)
METER GLUCOSE: 102 MG/DL (ref 74–99)
METER GLUCOSE: 105 MG/DL (ref 70–110)
METER GLUCOSE: 105 MG/DL (ref 70–110)
METER GLUCOSE: 105 MG/DL (ref 74–99)
METER GLUCOSE: 110 MG/DL (ref 74–99)
METER GLUCOSE: 112 MG/DL (ref 70–110)
METER GLUCOSE: 113 MG/DL (ref 74–99)
METER GLUCOSE: 114 MG/DL (ref 74–99)
METER GLUCOSE: 115 MG/DL (ref 74–99)
METER GLUCOSE: 119 MG/DL (ref 70–110)
METER GLUCOSE: 119 MG/DL (ref 74–99)
METER GLUCOSE: 120 MG/DL (ref 74–99)
METER GLUCOSE: 122 MG/DL (ref 74–99)
METER GLUCOSE: 123 MG/DL (ref 70–110)
METER GLUCOSE: 124 MG/DL (ref 70–110)
METER GLUCOSE: 127 MG/DL (ref 74–99)
METER GLUCOSE: 128 MG/DL (ref 70–110)
METER GLUCOSE: 128 MG/DL (ref 74–99)
METER GLUCOSE: 132 MG/DL (ref 74–99)
METER GLUCOSE: 133 MG/DL (ref 70–110)
METER GLUCOSE: 133 MG/DL (ref 70–110)
METER GLUCOSE: 135 MG/DL (ref 70–110)
METER GLUCOSE: 135 MG/DL (ref 74–99)
METER GLUCOSE: 136 MG/DL (ref 70–110)
METER GLUCOSE: 136 MG/DL (ref 70–110)
METER GLUCOSE: 136 MG/DL (ref 74–99)
METER GLUCOSE: 139 MG/DL (ref 70–110)
METER GLUCOSE: 141 MG/DL (ref 74–99)
METER GLUCOSE: 145 MG/DL (ref 70–110)
METER GLUCOSE: 145 MG/DL (ref 74–99)
METER GLUCOSE: 146 MG/DL (ref 70–110)
METER GLUCOSE: 146 MG/DL (ref 74–99)
METER GLUCOSE: 147 MG/DL (ref 70–110)
METER GLUCOSE: 155 MG/DL (ref 74–99)
METER GLUCOSE: 155 MG/DL (ref 74–99)
METER GLUCOSE: 156 MG/DL (ref 70–110)
METER GLUCOSE: 156 MG/DL (ref 70–110)
METER GLUCOSE: 162 MG/DL (ref 74–99)
METER GLUCOSE: 162 MG/DL (ref 74–99)
METER GLUCOSE: 163 MG/DL (ref 70–110)
METER GLUCOSE: 163 MG/DL (ref 74–99)
METER GLUCOSE: 166 MG/DL (ref 74–99)
METER GLUCOSE: 168 MG/DL (ref 74–99)
METER GLUCOSE: 169 MG/DL (ref 70–110)
METER GLUCOSE: 170 MG/DL (ref 70–110)
METER GLUCOSE: 170 MG/DL (ref 70–110)
METER GLUCOSE: 171 MG/DL (ref 70–110)
METER GLUCOSE: 171 MG/DL (ref 70–110)
METER GLUCOSE: 172 MG/DL (ref 70–110)
METER GLUCOSE: 172 MG/DL (ref 74–99)
METER GLUCOSE: 174 MG/DL (ref 74–99)
METER GLUCOSE: 176 MG/DL (ref 74–99)
METER GLUCOSE: 177 MG/DL (ref 74–99)
METER GLUCOSE: 178 MG/DL (ref 74–99)
METER GLUCOSE: 179 MG/DL (ref 70–110)
METER GLUCOSE: 179 MG/DL (ref 74–99)
METER GLUCOSE: 181 MG/DL (ref 70–110)
METER GLUCOSE: 182 MG/DL (ref 70–110)
METER GLUCOSE: 182 MG/DL (ref 74–99)
METER GLUCOSE: 182 MG/DL (ref 74–99)
METER GLUCOSE: 183 MG/DL (ref 70–110)
METER GLUCOSE: 184 MG/DL (ref 70–110)
METER GLUCOSE: 185 MG/DL (ref 70–110)
METER GLUCOSE: 186 MG/DL (ref 70–110)
METER GLUCOSE: 188 MG/DL (ref 74–99)
METER GLUCOSE: 191 MG/DL (ref 74–99)
METER GLUCOSE: 193 MG/DL (ref 70–110)
METER GLUCOSE: 193 MG/DL (ref 74–99)
METER GLUCOSE: 194 MG/DL (ref 74–99)
METER GLUCOSE: 196 MG/DL (ref 74–99)
METER GLUCOSE: 196 MG/DL (ref 74–99)
METER GLUCOSE: 197 MG/DL (ref 70–110)
METER GLUCOSE: 198 MG/DL (ref 74–99)
METER GLUCOSE: 199 MG/DL (ref 70–110)
METER GLUCOSE: 200 MG/DL (ref 74–99)
METER GLUCOSE: 201 MG/DL (ref 74–99)
METER GLUCOSE: 202 MG/DL (ref 70–110)
METER GLUCOSE: 203 MG/DL (ref 74–99)
METER GLUCOSE: 204 MG/DL (ref 70–110)
METER GLUCOSE: 205 MG/DL (ref 70–110)
METER GLUCOSE: 206 MG/DL (ref 70–110)
METER GLUCOSE: 207 MG/DL (ref 74–99)
METER GLUCOSE: 208 MG/DL (ref 70–110)
METER GLUCOSE: 208 MG/DL (ref 74–99)
METER GLUCOSE: 210 MG/DL (ref 74–99)
METER GLUCOSE: 210 MG/DL (ref 74–99)
METER GLUCOSE: 211 MG/DL (ref 70–110)
METER GLUCOSE: 212 MG/DL (ref 74–99)
METER GLUCOSE: 212 MG/DL (ref 74–99)
METER GLUCOSE: 213 MG/DL (ref 74–99)
METER GLUCOSE: 214 MG/DL (ref 70–110)
METER GLUCOSE: 214 MG/DL (ref 74–99)
METER GLUCOSE: 216 MG/DL (ref 70–110)
METER GLUCOSE: 216 MG/DL (ref 74–99)
METER GLUCOSE: 219 MG/DL (ref 70–110)
METER GLUCOSE: 220 MG/DL (ref 70–110)
METER GLUCOSE: 220 MG/DL (ref 70–110)
METER GLUCOSE: 220 MG/DL (ref 74–99)
METER GLUCOSE: 221 MG/DL (ref 70–110)
METER GLUCOSE: 221 MG/DL (ref 74–99)
METER GLUCOSE: 221 MG/DL (ref 74–99)
METER GLUCOSE: 223 MG/DL (ref 70–110)
METER GLUCOSE: 225 MG/DL (ref 70–110)
METER GLUCOSE: 225 MG/DL (ref 70–110)
METER GLUCOSE: 225 MG/DL (ref 74–99)
METER GLUCOSE: 226 MG/DL (ref 74–99)
METER GLUCOSE: 227 MG/DL (ref 70–110)
METER GLUCOSE: 227 MG/DL (ref 74–99)
METER GLUCOSE: 228 MG/DL (ref 74–99)
METER GLUCOSE: 230 MG/DL (ref 70–110)
METER GLUCOSE: 231 MG/DL (ref 74–99)
METER GLUCOSE: 232 MG/DL (ref 74–99)
METER GLUCOSE: 233 MG/DL (ref 70–110)
METER GLUCOSE: 233 MG/DL (ref 70–110)
METER GLUCOSE: 233 MG/DL (ref 74–99)
METER GLUCOSE: 236 MG/DL (ref 70–110)
METER GLUCOSE: 236 MG/DL (ref 74–99)
METER GLUCOSE: 240 MG/DL (ref 74–99)
METER GLUCOSE: 241 MG/DL (ref 70–110)
METER GLUCOSE: 241 MG/DL (ref 74–99)
METER GLUCOSE: 241 MG/DL (ref 74–99)
METER GLUCOSE: 245 MG/DL (ref 70–110)
METER GLUCOSE: 245 MG/DL (ref 74–99)
METER GLUCOSE: 246 MG/DL (ref 74–99)
METER GLUCOSE: 246 MG/DL (ref 74–99)
METER GLUCOSE: 248 MG/DL (ref 74–99)
METER GLUCOSE: 248 MG/DL (ref 74–99)
METER GLUCOSE: 249 MG/DL (ref 70–110)
METER GLUCOSE: 249 MG/DL (ref 74–99)
METER GLUCOSE: 249 MG/DL (ref 74–99)
METER GLUCOSE: 250 MG/DL (ref 70–110)
METER GLUCOSE: 251 MG/DL (ref 70–110)
METER GLUCOSE: 251 MG/DL (ref 74–99)
METER GLUCOSE: 252 MG/DL (ref 74–99)
METER GLUCOSE: 256 MG/DL (ref 70–110)
METER GLUCOSE: 257 MG/DL (ref 74–99)
METER GLUCOSE: 259 MG/DL (ref 70–110)
METER GLUCOSE: 259 MG/DL (ref 74–99)
METER GLUCOSE: 262 MG/DL (ref 74–99)
METER GLUCOSE: 270 MG/DL (ref 74–99)
METER GLUCOSE: 281 MG/DL (ref 74–99)
METER GLUCOSE: 283 MG/DL (ref 74–99)
METER GLUCOSE: 284 MG/DL (ref 74–99)
METER GLUCOSE: 287 MG/DL (ref 74–99)
METER GLUCOSE: 290 MG/DL (ref 74–99)
METER GLUCOSE: 291 MG/DL (ref 70–110)
METER GLUCOSE: 297 MG/DL (ref 74–99)
METER GLUCOSE: 298 MG/DL (ref 74–99)
METER GLUCOSE: 298 MG/DL (ref 74–99)
METER GLUCOSE: 301 MG/DL (ref 74–99)
METER GLUCOSE: 303 MG/DL (ref 74–99)
METER GLUCOSE: 303 MG/DL (ref 74–99)
METER GLUCOSE: 310 MG/DL (ref 74–99)
METER GLUCOSE: 311 MG/DL (ref 74–99)
METER GLUCOSE: 316 MG/DL (ref 74–99)
METER GLUCOSE: 318 MG/DL (ref 74–99)
METER GLUCOSE: 324 MG/DL (ref 74–99)
METER GLUCOSE: 332 MG/DL (ref 74–99)
METER GLUCOSE: 370 MG/DL (ref 74–99)
METER GLUCOSE: 372 MG/DL (ref 74–99)
METER GLUCOSE: 373 MG/DL (ref 74–99)
METER GLUCOSE: 51 MG/DL (ref 70–110)
METER GLUCOSE: 53 MG/DL (ref 70–110)
METER GLUCOSE: 53 MG/DL (ref 70–110)
METER GLUCOSE: 53 MG/DL (ref 74–99)
METER GLUCOSE: 56 MG/DL (ref 70–110)
METER GLUCOSE: 58 MG/DL (ref 74–99)
METER GLUCOSE: 60 MG/DL (ref 70–110)
METER GLUCOSE: 62 MG/DL (ref 74–99)
METER GLUCOSE: 64 MG/DL (ref 70–110)
METER GLUCOSE: 64 MG/DL (ref 74–99)
METER GLUCOSE: 66 MG/DL (ref 74–99)
METER GLUCOSE: 66 MG/DL (ref 74–99)
METER GLUCOSE: 69 MG/DL (ref 70–110)
METER GLUCOSE: 70 MG/DL (ref 70–110)
METER GLUCOSE: 71 MG/DL (ref 74–99)
METER GLUCOSE: 72 MG/DL (ref 70–110)
METER GLUCOSE: 72 MG/DL (ref 74–99)
METER GLUCOSE: 72 MG/DL (ref 74–99)
METER GLUCOSE: 74 MG/DL (ref 70–110)
METER GLUCOSE: 74 MG/DL (ref 74–99)
METER GLUCOSE: 75 MG/DL (ref 70–110)
METER GLUCOSE: 75 MG/DL (ref 74–99)
METER GLUCOSE: 76 MG/DL (ref 70–110)
METER GLUCOSE: 76 MG/DL (ref 74–99)
METER GLUCOSE: 83 MG/DL (ref 74–99)
METER GLUCOSE: 84 MG/DL (ref 74–99)
METER GLUCOSE: 85 MG/DL (ref 74–99)
METER GLUCOSE: 85 MG/DL (ref 74–99)
METER GLUCOSE: 86 MG/DL (ref 70–110)
METER GLUCOSE: 87 MG/DL (ref 70–110)
METER GLUCOSE: 87 MG/DL (ref 74–99)
METER GLUCOSE: 88 MG/DL (ref 70–110)
METER GLUCOSE: 89 MG/DL (ref 74–99)
METER GLUCOSE: 91 MG/DL (ref 70–110)
METER GLUCOSE: 94 MG/DL (ref 70–110)
METER GLUCOSE: 94 MG/DL (ref 70–110)
METER GLUCOSE: 97 MG/DL (ref 70–110)
METER GLUCOSE: 98 MG/DL (ref 70–110)
METER GLUCOSE: <40 MG/DL (ref 74–99)
METER GLUCOSE: <40 MG/DL (ref 74–99)
METHB: 0.1 % (ref 0–1.5)
METHB: 0.1 % (ref 0–1.5)
METHB: 0.2 % (ref 0–1.5)
METHB: 0.3 % (ref 0–1.5)
METHB: 0.5 % (ref 0–1.5)
MODE: ABNORMAL
MONOCYTES ABSOLUTE: 0.77 E9/L (ref 0.1–0.95)
MONOCYTES ABSOLUTE: 0.88 E9/L (ref 0.1–0.95)
MONOCYTES ABSOLUTE: 0.88 E9/L (ref 0.1–0.95)
MONOCYTES ABSOLUTE: 0.89 E9/L (ref 0.1–0.95)
MONOCYTES ABSOLUTE: 0.89 E9/L (ref 0.1–0.95)
MONOCYTES ABSOLUTE: 0.91 E9/L (ref 0.1–0.95)
MONOCYTES ABSOLUTE: 0.96 E9/L (ref 0.1–0.95)
MONOCYTES ABSOLUTE: 0.97 E9/L (ref 0.1–0.95)
MONOCYTES ABSOLUTE: 0.98 E9/L (ref 0.1–0.95)
MONOCYTES ABSOLUTE: 0.98 E9/L (ref 0.1–0.95)
MONOCYTES ABSOLUTE: 1.01 E9/L (ref 0.1–0.95)
MONOCYTES ABSOLUTE: 1.01 E9/L (ref 0.1–0.95)
MONOCYTES ABSOLUTE: 1.02 E9/L (ref 0.1–0.95)
MONOCYTES ABSOLUTE: 1.03 E9/L (ref 0.1–0.95)
MONOCYTES ABSOLUTE: 1.05 E9/L (ref 0.1–0.95)
MONOCYTES ABSOLUTE: 1.05 E9/L (ref 0.1–0.95)
MONOCYTES ABSOLUTE: 1.09 E9/L (ref 0.1–0.95)
MONOCYTES ABSOLUTE: 1.15 E9/L (ref 0.1–0.95)
MONOCYTES ABSOLUTE: 1.17 E9/L (ref 0.1–0.95)
MONOCYTES ABSOLUTE: 1.18 E9/L (ref 0.1–0.95)
MONOCYTES ABSOLUTE: 1.21 E9/L (ref 0.1–0.95)
MONOCYTES ABSOLUTE: 1.28 E9/L (ref 0.1–0.95)
MONOCYTES ABSOLUTE: 1.28 E9/L (ref 0.1–0.95)
MONOCYTES ABSOLUTE: 1.31 E9/L (ref 0.1–0.95)
MONOCYTES ABSOLUTE: 1.44 E9/L (ref 0.1–0.95)
MONOCYTES ABSOLUTE: 1.5 E9/L (ref 0.1–0.95)
MONOCYTES ABSOLUTE: 1.51 E9/L (ref 0.1–0.95)
MONOCYTES RELATIVE PERCENT: 10.1 % (ref 2–12)
MONOCYTES RELATIVE PERCENT: 10.6 % (ref 2–12)
MONOCYTES RELATIVE PERCENT: 11.4 % (ref 2–12)
MONOCYTES RELATIVE PERCENT: 4.9 % (ref 2–12)
MONOCYTES RELATIVE PERCENT: 5.2 % (ref 2–12)
MONOCYTES RELATIVE PERCENT: 5.7 % (ref 2–12)
MONOCYTES RELATIVE PERCENT: 6.1 % (ref 2–12)
MONOCYTES RELATIVE PERCENT: 6.1 % (ref 2–12)
MONOCYTES RELATIVE PERCENT: 6.4 % (ref 2–12)
MONOCYTES RELATIVE PERCENT: 6.6 % (ref 2–12)
MONOCYTES RELATIVE PERCENT: 6.9 % (ref 2–12)
MONOCYTES RELATIVE PERCENT: 6.9 % (ref 2–12)
MONOCYTES RELATIVE PERCENT: 7 % (ref 2–12)
MONOCYTES RELATIVE PERCENT: 7 % (ref 2–12)
MONOCYTES RELATIVE PERCENT: 7.1 % (ref 2–12)
MONOCYTES RELATIVE PERCENT: 7.4 % (ref 2–12)
MONOCYTES RELATIVE PERCENT: 7.4 % (ref 2–12)
MONOCYTES RELATIVE PERCENT: 7.7 % (ref 2–12)
MONOCYTES RELATIVE PERCENT: 7.7 % (ref 2–12)
MONOCYTES RELATIVE PERCENT: 7.8 % (ref 2–12)
MONOCYTES RELATIVE PERCENT: 8.5 % (ref 2–12)
MONOCYTES RELATIVE PERCENT: 8.6 % (ref 2–12)
MONOCYTES RELATIVE PERCENT: 8.7 % (ref 2–12)
MONOCYTES RELATIVE PERCENT: 8.7 % (ref 2–12)
MONOCYTES RELATIVE PERCENT: 9.1 % (ref 2–12)
MONOCYTES RELATIVE PERCENT: 9.5 % (ref 2–12)
MONOCYTES RELATIVE PERCENT: 9.9 % (ref 2–12)
NEUTROPHILS ABSOLUTE: 10.14 E9/L (ref 1.8–7.3)
NEUTROPHILS ABSOLUTE: 11.02 E9/L (ref 1.8–7.3)
NEUTROPHILS ABSOLUTE: 11.08 E9/L (ref 1.8–7.3)
NEUTROPHILS ABSOLUTE: 11.4 E9/L (ref 1.8–7.3)
NEUTROPHILS ABSOLUTE: 11.98 E9/L (ref 1.8–7.3)
NEUTROPHILS ABSOLUTE: 12.21 E9/L (ref 1.8–7.3)
NEUTROPHILS ABSOLUTE: 12.46 E9/L (ref 1.8–7.3)
NEUTROPHILS ABSOLUTE: 12.73 E9/L (ref 1.8–7.3)
NEUTROPHILS ABSOLUTE: 18.73 E9/L (ref 1.8–7.3)
NEUTROPHILS ABSOLUTE: 25.07 E9/L (ref 1.8–7.3)
NEUTROPHILS ABSOLUTE: 6.67 E9/L (ref 1.8–7.3)
NEUTROPHILS ABSOLUTE: 7.95 E9/L (ref 1.8–7.3)
NEUTROPHILS ABSOLUTE: 8.03 E9/L (ref 1.8–7.3)
NEUTROPHILS ABSOLUTE: 8.04 E9/L (ref 1.8–7.3)
NEUTROPHILS ABSOLUTE: 8.16 E9/L (ref 1.8–7.3)
NEUTROPHILS ABSOLUTE: 8.39 E9/L (ref 1.8–7.3)
NEUTROPHILS ABSOLUTE: 8.49 E9/L (ref 1.8–7.3)
NEUTROPHILS ABSOLUTE: 8.5 E9/L (ref 1.8–7.3)
NEUTROPHILS ABSOLUTE: 8.65 E9/L (ref 1.8–7.3)
NEUTROPHILS ABSOLUTE: 8.71 E9/L (ref 1.8–7.3)
NEUTROPHILS ABSOLUTE: 9.01 E9/L (ref 1.8–7.3)
NEUTROPHILS ABSOLUTE: 9.01 E9/L (ref 1.8–7.3)
NEUTROPHILS ABSOLUTE: 9.3 E9/L (ref 1.8–7.3)
NEUTROPHILS ABSOLUTE: 9.31 E9/L (ref 1.8–7.3)
NEUTROPHILS ABSOLUTE: 9.46 E9/L (ref 1.8–7.3)
NEUTROPHILS ABSOLUTE: 9.53 E9/L (ref 1.8–7.3)
NEUTROPHILS ABSOLUTE: 9.74 E9/L (ref 1.8–7.3)
NEUTROPHILS RELATIVE PERCENT: 60.9 % (ref 43–80)
NEUTROPHILS RELATIVE PERCENT: 61.2 % (ref 43–80)
NEUTROPHILS RELATIVE PERCENT: 62.3 % (ref 43–80)
NEUTROPHILS RELATIVE PERCENT: 62.6 % (ref 43–80)
NEUTROPHILS RELATIVE PERCENT: 64.6 % (ref 43–80)
NEUTROPHILS RELATIVE PERCENT: 65.1 % (ref 43–80)
NEUTROPHILS RELATIVE PERCENT: 65.6 % (ref 43–80)
NEUTROPHILS RELATIVE PERCENT: 65.6 % (ref 43–80)
NEUTROPHILS RELATIVE PERCENT: 66.7 % (ref 43–80)
NEUTROPHILS RELATIVE PERCENT: 66.7 % (ref 43–80)
NEUTROPHILS RELATIVE PERCENT: 67 % (ref 43–80)
NEUTROPHILS RELATIVE PERCENT: 67.4 % (ref 43–80)
NEUTROPHILS RELATIVE PERCENT: 67.4 % (ref 43–80)
NEUTROPHILS RELATIVE PERCENT: 67.5 % (ref 43–80)
NEUTROPHILS RELATIVE PERCENT: 68.2 % (ref 43–80)
NEUTROPHILS RELATIVE PERCENT: 69.9 % (ref 43–80)
NEUTROPHILS RELATIVE PERCENT: 74.2 % (ref 43–80)
NEUTROPHILS RELATIVE PERCENT: 76.1 % (ref 43–80)
NEUTROPHILS RELATIVE PERCENT: 77.9 % (ref 43–80)
NEUTROPHILS RELATIVE PERCENT: 78.2 % (ref 43–80)
NEUTROPHILS RELATIVE PERCENT: 79.7 % (ref 43–80)
NEUTROPHILS RELATIVE PERCENT: 79.8 % (ref 43–80)
NEUTROPHILS RELATIVE PERCENT: 80.6 % (ref 43–80)
NEUTROPHILS RELATIVE PERCENT: 81 % (ref 43–80)
NEUTROPHILS RELATIVE PERCENT: 84.2 % (ref 43–80)
NEUTROPHILS RELATIVE PERCENT: 85.6 % (ref 43–80)
NEUTROPHILS RELATIVE PERCENT: 87.2 % (ref 43–80)
NITRITE, URINE: NEGATIVE
O2 CONTENT: 12.5 ML/DL
O2 CONTENT: 13.1 ML/DL
O2 CONTENT: 14.5 ML/DL
O2 CONTENT: 14.7 ML/DL
O2 CONTENT: 14.8 ML/DL
O2 SATURATION: 84.7 % (ref 92–98.5)
O2 SATURATION: 94.7 % (ref 92–98.5)
O2 SATURATION: 97.4 % (ref 92–98.5)
O2 SATURATION: 98 % (ref 92–98.5)
O2 SATURATION: 99.7 % (ref 92–98.5)
O2HB: 83.5 % (ref 94–97)
O2HB: 94.2 % (ref 94–97)
O2HB: 96.6 % (ref 94–97)
O2HB: 96.9 % (ref 94–97)
O2HB: 98.8 % (ref 94–97)
OPERATOR ID: 1661
OPERATOR ID: 2485
OPERATOR ID: 754
OPERATOR ID: 754
OPERATOR ID: ABNORMAL
ORGANISM: ABNORMAL
OVALOCYTES: ABNORMAL
PATIENT TEMP: 37 C
PCO2: 31 MMHG (ref 35–45)
PCO2: 31.4 MMHG (ref 35–45)
PCO2: 35.9 MMHG (ref 35–45)
PCO2: 40.3 MMHG (ref 35–45)
PCO2: 57.8 MMHG (ref 35–45)
PDW BLD-RTO: 15.4 FL (ref 11.5–15)
PDW BLD-RTO: 15.5 FL (ref 11.5–15)
PDW BLD-RTO: 15.6 FL (ref 11.5–15)
PDW BLD-RTO: 15.7 FL (ref 11.5–15)
PDW BLD-RTO: 17.6 FL (ref 11.5–15)
PDW BLD-RTO: 17.6 FL (ref 11.5–15)
PDW BLD-RTO: 17.7 FL (ref 11.5–15)
PDW BLD-RTO: 17.8 FL (ref 11.5–15)
PDW BLD-RTO: 17.9 FL (ref 11.5–15)
PDW BLD-RTO: 17.9 FL (ref 11.5–15)
PDW BLD-RTO: 18 FL (ref 11.5–15)
PDW BLD-RTO: 18 FL (ref 11.5–15)
PDW BLD-RTO: 18.1 FL (ref 11.5–15)
PDW BLD-RTO: 18.1 FL (ref 11.5–15)
PDW BLD-RTO: 18.2 FL (ref 11.5–15)
PDW BLD-RTO: 18.3 FL (ref 11.5–15)
PDW BLD-RTO: 18.4 FL (ref 11.5–15)
PDW BLD-RTO: 18.5 FL (ref 11.5–15)
PDW BLD-RTO: 18.6 FL (ref 11.5–15)
PDW BLD-RTO: 19.4 FL (ref 11.5–15)
PEEP/CPAP: 6 CMH2O
PFO2: 2.83 MMHG/%
PH BLOOD GAS: 7.41 (ref 7.35–7.45)
PH BLOOD GAS: 7.46 (ref 7.35–7.45)
PH BLOOD GAS: 7.46 (ref 7.35–7.45)
PH BLOOD GAS: 7.48 (ref 7.35–7.45)
PH BLOOD GAS: 7.48 (ref 7.35–7.45)
PH UA: 5.5 (ref 5–9)
PH UA: 5.5 (ref 5–9)
PH UA: 6 (ref 5–9)
PH UA: 6.5 (ref 5–9)
PH UA: 7 (ref 5–9)
PH UA: 7 (ref 5–9)
PHOSPHORUS: 2.6 MG/DL (ref 2.5–4.5)
PHOSPHORUS: 2.8 MG/DL (ref 2.5–4.5)
PHOSPHORUS: 3 MG/DL (ref 2.5–4.5)
PHOSPHORUS: 3.1 MG/DL (ref 2.5–4.5)
PIP: 12 CMH2O
PLATELET # BLD: 198 E9/L (ref 130–450)
PLATELET # BLD: 199 E9/L (ref 130–450)
PLATELET # BLD: 203 E9/L (ref 130–450)
PLATELET # BLD: 208 E9/L (ref 130–450)
PLATELET # BLD: 210 E9/L (ref 130–450)
PLATELET # BLD: 211 E9/L (ref 130–450)
PLATELET # BLD: 218 E9/L (ref 130–450)
PLATELET # BLD: 219 E9/L (ref 130–450)
PLATELET # BLD: 219 E9/L (ref 130–450)
PLATELET # BLD: 223 E9/L (ref 130–450)
PLATELET # BLD: 224 E9/L (ref 130–450)
PLATELET # BLD: 232 E9/L (ref 130–450)
PLATELET # BLD: 237 E9/L (ref 130–450)
PLATELET # BLD: 242 E9/L (ref 130–450)
PLATELET # BLD: 243 E9/L (ref 130–450)
PLATELET # BLD: 249 E9/L (ref 130–450)
PLATELET # BLD: 251 E9/L (ref 130–450)
PLATELET # BLD: 253 E9/L (ref 130–450)
PLATELET # BLD: 254 E9/L (ref 130–450)
PLATELET # BLD: 258 E9/L (ref 130–450)
PLATELET # BLD: 264 E9/L (ref 130–450)
PLATELET # BLD: 266 E9/L (ref 130–450)
PLATELET # BLD: 268 E9/L (ref 130–450)
PLATELET # BLD: 269 E9/L (ref 130–450)
PLATELET # BLD: 272 E9/L (ref 130–450)
PLATELET # BLD: 276 E9/L (ref 130–450)
PLATELET # BLD: 295 E9/L (ref 130–450)
PLATELET # BLD: 299 E9/L (ref 130–450)
PLATELET # BLD: 301 E9/L (ref 130–450)
PLATELET # BLD: 308 E9/L (ref 130–450)
PLATELET # BLD: 314 E9/L (ref 130–450)
PLATELET # BLD: 317 E9/L (ref 130–450)
PLATELET # BLD: 317 E9/L (ref 130–450)
PLATELET # BLD: 327 E9/L (ref 130–450)
PLATELET # BLD: 350 E9/L (ref 130–450)
PLATELET # BLD: 359 E9/L (ref 130–450)
PLATELET # BLD: 373 E9/L (ref 130–450)
PMV BLD AUTO: 11.7 FL (ref 7–12)
PMV BLD AUTO: 11.9 FL (ref 7–12)
PMV BLD AUTO: 11.9 FL (ref 7–12)
PMV BLD AUTO: 12 FL (ref 7–12)
PMV BLD AUTO: 12.1 FL (ref 7–12)
PMV BLD AUTO: 12.1 FL (ref 7–12)
PMV BLD AUTO: 12.2 FL (ref 7–12)
PMV BLD AUTO: 12.3 FL (ref 7–12)
PMV BLD AUTO: 12.3 FL (ref 7–12)
PMV BLD AUTO: 12.4 FL (ref 7–12)
PMV BLD AUTO: 12.4 FL (ref 7–12)
PMV BLD AUTO: 12.5 FL (ref 7–12)
PMV BLD AUTO: 12.7 FL (ref 7–12)
PMV BLD AUTO: 12.8 FL (ref 7–12)
PMV BLD AUTO: 12.9 FL (ref 7–12)
PMV BLD AUTO: 12.9 FL (ref 7–12)
PMV BLD AUTO: 13 FL (ref 7–12)
PMV BLD AUTO: 13 FL (ref 7–12)
PMV BLD AUTO: 13.1 FL (ref 7–12)
PMV BLD AUTO: 13.1 FL (ref 7–12)
PMV BLD AUTO: 13.3 FL (ref 7–12)
PO2: 104.8 MMHG (ref 60–100)
PO2: 283 MMHG (ref 60–100)
PO2: 51.3 MMHG (ref 60–100)
PO2: 73.6 MMHG (ref 60–100)
PO2: 94.7 MMHG (ref 60–100)
POIKILOCYTES: ABNORMAL
POLYCHROMASIA: ABNORMAL
POTASSIUM REFLEX MAGNESIUM: 3.7 MMOL/L (ref 3.5–5)
POTASSIUM REFLEX MAGNESIUM: 4.2 MMOL/L (ref 3.5–5)
POTASSIUM REFLEX MAGNESIUM: 4.4 MMOL/L (ref 3.5–5)
POTASSIUM REFLEX MAGNESIUM: 4.4 MMOL/L (ref 3.5–5)
POTASSIUM REFLEX MAGNESIUM: 4.7 MMOL/L (ref 3.5–5)
POTASSIUM REFLEX MAGNESIUM: 5.2 MMOL/L (ref 3.5–5)
POTASSIUM SERPL-SCNC: 3.4 MMOL/L (ref 3.5–5)
POTASSIUM SERPL-SCNC: 3.6 MMOL/L (ref 3.5–5)
POTASSIUM SERPL-SCNC: 3.7 MMOL/L (ref 3.5–5)
POTASSIUM SERPL-SCNC: 3.8 MMOL/L (ref 3.5–5)
POTASSIUM SERPL-SCNC: 3.8 MMOL/L (ref 3.5–5)
POTASSIUM SERPL-SCNC: 3.9 MMOL/L (ref 3.5–5)
POTASSIUM SERPL-SCNC: 4 MMOL/L (ref 3.5–5)
POTASSIUM SERPL-SCNC: 4.1 MMOL/L (ref 3.5–5)
POTASSIUM SERPL-SCNC: 4.2 MMOL/L (ref 3.5–5)
POTASSIUM SERPL-SCNC: 4.3 MMOL/L (ref 3.5–5)
POTASSIUM SERPL-SCNC: 4.3 MMOL/L (ref 3.5–5)
POTASSIUM SERPL-SCNC: 4.4 MMOL/L (ref 3.5–5)
POTASSIUM SERPL-SCNC: 4.5 MMOL/L (ref 3.5–5)
POTASSIUM SERPL-SCNC: 4.5 MMOL/L (ref 3.5–5)
POTASSIUM SERPL-SCNC: 4.6 MMOL/L (ref 3.5–5)
POTASSIUM SERPL-SCNC: 4.7 MMOL/L (ref 3.5–5)
POTASSIUM SERPL-SCNC: 4.8 MMOL/L (ref 3.5–5)
POTASSIUM SERPL-SCNC: 4.9 MMOL/L (ref 3.5–5)
POTASSIUM SERPL-SCNC: 5.1 MMOL/L (ref 3.5–5)
POTASSIUM SERPL-SCNC: 6 MMOL/L (ref 3.5–5)
PRO-BNP: 5761 PG/ML (ref 0–450)
PRO-BNP: 9696 PG/ML (ref 0–450)
PRO-BNP: ABNORMAL PG/ML (ref 0–450)
PROCALCITONIN: 0.17 NG/ML (ref 0–0.08)
PROCALCITONIN: 0.42 NG/ML (ref 0–0.08)
PROCALCITONIN: 3.78 NG/ML (ref 0–0.08)
PROSTATE SPECIFIC ANTIGEN: 0.19 NG/ML (ref 0–4)
PROTEIN PROTEIN: 147 MG/DL (ref 0–12)
PROTEIN UA: 100 MG/DL
PROTEIN UA: 30 MG/DL
PROTEIN UA: 30 MG/DL
PROTEIN UA: >=300 MG/DL
PROTEIN UA: >=300 MG/DL
PROTEIN UA: NEGATIVE MG/DL
PROTEIN/CREAT RATIO: 1.9
PROTEIN/CREAT RATIO: 1.9 (ref 0–0.2)
PROTHROMBIN TIME: 12.6 SEC (ref 9.3–12.4)
PROTHROMBIN TIME: 13.6 SEC (ref 9.3–12.4)
PROTHROMBIN TIME: 13.9 SEC (ref 9.3–12.4)
PROTHROMBIN TIME: 13.9 SEC (ref 9.3–12.4)
PROTHROMBIN TIME: 14 SEC (ref 9.3–12.4)
PROTHROMBIN TIME: 14 SEC (ref 9.3–12.4)
PROTHROMBIN TIME: 14.1 SEC (ref 9.3–12.4)
PROTHROMBIN TIME: 14.1 SEC (ref 9.3–12.4)
PROTHROMBIN TIME: 14.2 SEC (ref 9.3–12.4)
PROTHROMBIN TIME: 14.2 SEC (ref 9.3–12.4)
PROTHROMBIN TIME: 14.3 SEC (ref 9.3–12.4)
PROTHROMBIN TIME: 14.4 SEC (ref 9.3–12.4)
PROTHROMBIN TIME: 14.5 SEC (ref 9.3–12.4)
PROTHROMBIN TIME: 14.6 SEC (ref 9.3–12.4)
PROTHROMBIN TIME: 14.7 SEC (ref 9.3–12.4)
PROTHROMBIN TIME: 15.2 SEC (ref 9.3–12.4)
PROTHROMBIN TIME: 15.4 SEC (ref 9.3–12.4)
PROTHROMBIN TIME: 15.4 SEC (ref 9.3–12.4)
PROTHROMBIN TIME: 15.6 SEC (ref 9.3–12.4)
PROTHROMBIN TIME: 16.1 SEC (ref 9.3–12.4)
PROTHROMBIN TIME: 16.2 SEC (ref 9.3–12.4)
PROTHROMBIN TIME: 17 SEC (ref 9.3–12.4)
PROTHROMBIN TIME: 17.1 SEC (ref 9.3–12.4)
PROTHROMBIN TIME: 17.5 SEC (ref 9.3–12.4)
PROTHROMBIN TIME: 17.7 SEC (ref 9.3–12.4)
PROTHROMBIN TIME: 19 SEC (ref 9.3–12.4)
PROTHROMBIN TIME: 19.8 SEC (ref 9.3–12.4)
PROTHROMBIN TIME: 20.2 SEC (ref 9.3–12.4)
PROTHROMBIN TIME: 20.4 SEC (ref 9.3–12.4)
PROTHROMBIN TIME: 21.4 SEC (ref 9.3–12.4)
PROTHROMBIN TIME: 21.8 SEC (ref 9.3–12.4)
PROTHROMBIN TIME: 23.3 SEC (ref 9.3–12.4)
PROTHROMBIN TIME: 23.4 SEC (ref 9.3–12.4)
PROTHROMBIN TIME: 26.9 SEC (ref 9.3–12.4)
PROTHROMBIN TIME: 27.6 SEC (ref 9.3–12.4)
PROTHROMBIN TIME: 27.8 SEC (ref 9.3–12.4)
PROTHROMBIN TIME: 29.1 SEC (ref 9.3–12.4)
PROTHROMBIN TIME: 30.2 SEC (ref 9.3–12.4)
PROTHROMBIN TIME: 30.3 SEC (ref 9.3–12.4)
PROTHROMBIN TIME: 32.1 SEC (ref 9.3–12.4)
PROTHROMBIN TIME: 35.9 SEC (ref 9.3–12.4)
PROTHROMBIN TIME: 42.7 SEC (ref 9.3–12.4)
PROTHROMBIN TIME: 43.3 SEC (ref 9.3–12.4)
PROTHROMBIN TIME: 45.4 SEC (ref 9.3–12.4)
PROTHROMBIN TIME: 49.5 SEC (ref 9.3–12.4)
PROTHROMBIN TIME: 63.2 SEC (ref 9.3–12.4)
PROTHROMBIN TIME: 90.5 SEC (ref 9.3–12.4)
PROTHROMBIN TIME: 97 SEC (ref 9.3–12.4)
RBC # BLD: 2.7 E12/L (ref 3.8–5.8)
RBC # BLD: 2.84 E12/L (ref 3.8–5.8)
RBC # BLD: 2.94 E12/L (ref 3.8–5.8)
RBC # BLD: 2.97 E12/L (ref 3.8–5.8)
RBC # BLD: 3.04 E12/L (ref 3.8–5.8)
RBC # BLD: 3.06 E12/L (ref 3.8–5.8)
RBC # BLD: 3.07 E12/L (ref 3.8–5.8)
RBC # BLD: 3.15 E12/L (ref 3.8–5.8)
RBC # BLD: 3.17 E12/L (ref 3.8–5.8)
RBC # BLD: 3.18 E12/L (ref 3.8–5.8)
RBC # BLD: 3.24 E12/L (ref 3.8–5.8)
RBC # BLD: 3.26 E12/L (ref 3.8–5.8)
RBC # BLD: 3.26 E12/L (ref 3.8–5.8)
RBC # BLD: 3.33 E12/L (ref 3.8–5.8)
RBC # BLD: 3.4 E12/L (ref 3.8–5.8)
RBC # BLD: 3.41 E12/L (ref 3.8–5.8)
RBC # BLD: 3.44 E12/L (ref 3.8–5.8)
RBC # BLD: 3.46 E12/L (ref 3.8–5.8)
RBC # BLD: 3.5 E12/L (ref 3.8–5.8)
RBC # BLD: 3.5 E12/L (ref 3.8–5.8)
RBC # BLD: 3.53 E12/L (ref 3.8–5.8)
RBC # BLD: 3.64 E12/L (ref 3.8–5.8)
RBC # BLD: 3.7 E12/L (ref 3.8–5.8)
RBC # BLD: 3.72 E12/L (ref 3.8–5.8)
RBC # BLD: 3.72 E12/L (ref 3.8–5.8)
RBC # BLD: 3.75 E12/L (ref 3.8–5.8)
RBC # BLD: 3.82 E12/L (ref 3.8–5.8)
RBC # BLD: 3.9 E12/L (ref 3.8–5.8)
RBC # BLD: 3.91 E12/L (ref 3.8–5.8)
RBC # BLD: 3.91 E12/L (ref 3.8–5.8)
RBC # BLD: 3.93 E12/L (ref 3.8–5.8)
RBC # BLD: 3.97 E12/L (ref 3.8–5.8)
RBC # BLD: 4.05 E12/L (ref 3.8–5.8)
RBC # BLD: 4.06 E12/L (ref 3.8–5.8)
RBC # BLD: 4.09 E12/L (ref 3.8–5.8)
RBC # BLD: 4.11 E12/L (ref 3.8–5.8)
RBC # BLD: 4.46 E12/L (ref 3.8–5.8)
RBC UA: >20 /HPF (ref 0–2)
RBC UA: >20 /HPF (ref 0–2)
RBC UA: ABNORMAL /HPF (ref 0–2)
RBC UA: NORMAL /HPF (ref 0–2)
RENAL EPITHELIAL, UA: ABNORMAL /HPF
RENAL EPITHELIAL, UA: ABNORMAL /HPF
RI(T): 130 %
SCHISTOCYTES: ABNORMAL
SODIUM BLD-SCNC: 135 MMOL/L (ref 132–146)
SODIUM BLD-SCNC: 136 MMOL/L (ref 132–146)
SODIUM BLD-SCNC: 137 MMOL/L (ref 132–146)
SODIUM BLD-SCNC: 137 MMOL/L (ref 132–146)
SODIUM BLD-SCNC: 138 MMOL/L (ref 132–146)
SODIUM BLD-SCNC: 139 MMOL/L (ref 132–146)
SODIUM BLD-SCNC: 140 MMOL/L (ref 132–146)
SODIUM BLD-SCNC: 141 MMOL/L (ref 132–146)
SODIUM BLD-SCNC: 142 MMOL/L (ref 132–146)
SODIUM BLD-SCNC: 143 MMOL/L (ref 132–146)
SODIUM BLD-SCNC: 144 MMOL/L (ref 132–146)
SODIUM BLD-SCNC: 145 MMOL/L (ref 132–146)
SODIUM URINE: 20 MMOL/L
SOURCE, BLOOD GAS: ABNORMAL
SPECIFIC GRAVITY UA: 1.01 (ref 1–1.03)
SPECIFIC GRAVITY UA: 1.02 (ref 1–1.03)
SPECIFIC GRAVITY UA: >=1.03 (ref 1–1.03)
STREP PNEUMONIAE ANTIGEN, URINE: NORMAL
TEAR DROP CELLS: ABNORMAL
TEAR DROP CELLS: ABNORMAL
THB: 10.1 G/DL (ref 11.5–16.5)
THB: 10.5 G/DL (ref 11.5–16.5)
THB: 12.5 G/DL (ref 11.5–16.5)
THB: 9.1 G/DL (ref 11.5–16.5)
THB: 9.8 G/DL (ref 11.5–16.5)
TIME ANALYZED: 1103
TIME ANALYZED: 1517
TIME ANALYZED: 1710
TIME ANALYZED: 1907
TIME ANALYZED: 311
TOTAL CK: 22 U/L (ref 20–200)
TOTAL CK: 22 U/L (ref 20–200)
TOTAL CK: 47 U/L (ref 20–200)
TOTAL PROTEIN: 5.5 G/DL (ref 6.4–8.3)
TOTAL PROTEIN: 5.6 G/DL (ref 6.4–8.3)
TOTAL PROTEIN: 5.9 G/DL (ref 6.4–8.3)
TOTAL PROTEIN: 6 G/DL (ref 6.4–8.3)
TOTAL PROTEIN: 6 G/DL (ref 6.4–8.3)
TOTAL PROTEIN: 6.1 G/DL (ref 6.4–8.3)
TOTAL PROTEIN: 6.3 G/DL (ref 6.4–8.3)
TOTAL PROTEIN: 6.4 G/DL (ref 6.4–8.3)
TOTAL PROTEIN: 6.4 G/DL (ref 6.4–8.3)
TOTAL PROTEIN: 6.5 G/DL (ref 6.4–8.3)
TOTAL PROTEIN: 7.2 G/DL (ref 6.4–8.3)
TROPONIN: 0.02 NG/ML (ref 0–0.03)
TROPONIN: 0.03 NG/ML (ref 0–0.03)
TROPONIN: 0.05 NG/ML (ref 0–0.03)
URINE CULTURE, ROUTINE: ABNORMAL
URINE CULTURE, ROUTINE: NORMAL
UROBILINOGEN, URINE: 0.2 E.U./DL
WBC # BLD: 10.2 E9/L (ref 4.5–11.5)
WBC # BLD: 11.4 E9/L (ref 4.5–11.5)
WBC # BLD: 11.5 E9/L (ref 4.5–11.5)
WBC # BLD: 12.1 E9/L (ref 4.5–11.5)
WBC # BLD: 12.2 E9/L (ref 4.5–11.5)
WBC # BLD: 12.5 E9/L (ref 4.5–11.5)
WBC # BLD: 12.6 E9/L (ref 4.5–11.5)
WBC # BLD: 12.6 E9/L (ref 4.5–11.5)
WBC # BLD: 12.7 E9/L (ref 4.5–11.5)
WBC # BLD: 12.8 E9/L (ref 4.5–11.5)
WBC # BLD: 12.9 E9/L (ref 4.5–11.5)
WBC # BLD: 13.1 E9/L (ref 4.5–11.5)
WBC # BLD: 13.1 E9/L (ref 4.5–11.5)
WBC # BLD: 13.2 E9/L (ref 4.5–11.5)
WBC # BLD: 13.2 E9/L (ref 4.5–11.5)
WBC # BLD: 13.3 E9/L (ref 4.5–11.5)
WBC # BLD: 13.4 E9/L (ref 4.5–11.5)
WBC # BLD: 13.8 E9/L (ref 4.5–11.5)
WBC # BLD: 14 E9/L (ref 4.5–11.5)
WBC # BLD: 14.3 E9/L (ref 4.5–11.5)
WBC # BLD: 14.5 E9/L (ref 4.5–11.5)
WBC # BLD: 14.6 E9/L (ref 4.5–11.5)
WBC # BLD: 14.6 E9/L (ref 4.5–11.5)
WBC # BLD: 14.8 E9/L (ref 4.5–11.5)
WBC # BLD: 15 E9/L (ref 4.5–11.5)
WBC # BLD: 15.1 E9/L (ref 4.5–11.5)
WBC # BLD: 15.2 E9/L (ref 4.5–11.5)
WBC # BLD: 15.4 E9/L (ref 4.5–11.5)
WBC # BLD: 15.8 E9/L (ref 4.5–11.5)
WBC # BLD: 15.9 E9/L (ref 4.5–11.5)
WBC # BLD: 16.9 E9/L (ref 4.5–11.5)
WBC # BLD: 21.5 E9/L (ref 4.5–11.5)
WBC # BLD: 29.3 E9/L (ref 4.5–11.5)
WBC # BLD: 9.9 E9/L (ref 4.5–11.5)
WBC UA: >20 /HPF (ref 0–5)
WBC UA: ABNORMAL /HPF (ref 0–5)
WOUND/ABSCESS: ABNORMAL
YEAST: ABNORMAL
YEAST: ABNORMAL
YEAST: NORMAL

## 2018-01-01 PROCEDURE — 99233 SBSQ HOSP IP/OBS HIGH 50: CPT | Performed by: INTERNAL MEDICINE

## 2018-01-01 PROCEDURE — 71045 X-RAY EXAM CHEST 1 VIEW: CPT

## 2018-01-01 PROCEDURE — 80048 BASIC METABOLIC PNL TOTAL CA: CPT

## 2018-01-01 PROCEDURE — 7100000010 HC PHASE II RECOVERY - FIRST 15 MIN: Performed by: UROLOGY

## 2018-01-01 PROCEDURE — G8484 FLU IMMUNIZE NO ADMIN: HCPCS | Performed by: PHYSICIAN ASSISTANT

## 2018-01-01 PROCEDURE — 85027 COMPLETE CBC AUTOMATED: CPT

## 2018-01-01 PROCEDURE — 82805 BLOOD GASES W/O2 SATURATION: CPT

## 2018-01-01 PROCEDURE — 88112 CYTOPATH CELL ENHANCE TECH: CPT

## 2018-01-01 PROCEDURE — 3600000013 HC SURGERY LEVEL 3 ADDTL 15MIN: Performed by: UROLOGY

## 2018-01-01 PROCEDURE — 2700000000 HC OXYGEN THERAPY PER DAY

## 2018-01-01 PROCEDURE — 99284 EMERGENCY DEPT VISIT MOD MDM: CPT

## 2018-01-01 PROCEDURE — 36415 COLL VENOUS BLD VENIPUNCTURE: CPT

## 2018-01-01 PROCEDURE — 6370000000 HC RX 637 (ALT 250 FOR IP): Performed by: NURSE PRACTITIONER

## 2018-01-01 PROCEDURE — 6370000000 HC RX 637 (ALT 250 FOR IP): Performed by: INTERNAL MEDICINE

## 2018-01-01 PROCEDURE — 2580000003 HC RX 258: Performed by: INTERNAL MEDICINE

## 2018-01-01 PROCEDURE — APPSS30 APP SPLIT SHARED TIME 16-30 MINUTES: Performed by: PHYSICIAN ASSISTANT

## 2018-01-01 PROCEDURE — 2000000000 HC ICU R&B

## 2018-01-01 PROCEDURE — G8978 MOBILITY CURRENT STATUS: HCPCS

## 2018-01-01 PROCEDURE — 1123F ACP DISCUSS/DSCN MKR DOCD: CPT | Performed by: INTERNAL MEDICINE

## 2018-01-01 PROCEDURE — 82962 GLUCOSE BLOOD TEST: CPT

## 2018-01-01 PROCEDURE — 2580000003 HC RX 258: Performed by: UROLOGY

## 2018-01-01 PROCEDURE — 87070 CULTURE OTHR SPECIMN AEROBIC: CPT

## 2018-01-01 PROCEDURE — 6360000002 HC RX W HCPCS: Performed by: INTERNAL MEDICINE

## 2018-01-01 PROCEDURE — 86900 BLOOD TYPING SEROLOGIC ABO: CPT

## 2018-01-01 PROCEDURE — 85025 COMPLETE CBC W/AUTO DIFF WBC: CPT

## 2018-01-01 PROCEDURE — 97161 PT EVAL LOW COMPLEX 20 MIN: CPT

## 2018-01-01 PROCEDURE — 2580000003 HC RX 258: Performed by: EMERGENCY MEDICINE

## 2018-01-01 PROCEDURE — 81001 URINALYSIS AUTO W/SCOPE: CPT

## 2018-01-01 PROCEDURE — 80053 COMPREHEN METABOLIC PANEL: CPT

## 2018-01-01 PROCEDURE — 6370000000 HC RX 637 (ALT 250 FOR IP): Performed by: EMERGENCY MEDICINE

## 2018-01-01 PROCEDURE — 85610 PROTHROMBIN TIME: CPT

## 2018-01-01 PROCEDURE — 97535 SELF CARE MNGMENT TRAINING: CPT

## 2018-01-01 PROCEDURE — 96375 TX/PRO/DX INJ NEW DRUG ADDON: CPT

## 2018-01-01 PROCEDURE — 1200000000 HC SEMI PRIVATE

## 2018-01-01 PROCEDURE — 99285 EMERGENCY DEPT VISIT HI MDM: CPT

## 2018-01-01 PROCEDURE — 51702 INSERT TEMP BLADDER CATH: CPT

## 2018-01-01 PROCEDURE — 97530 THERAPEUTIC ACTIVITIES: CPT

## 2018-01-01 PROCEDURE — 84100 ASSAY OF PHOSPHORUS: CPT

## 2018-01-01 PROCEDURE — 51700 IRRIGATION OF BLADDER: CPT

## 2018-01-01 PROCEDURE — 2500000003 HC RX 250 WO HCPCS: Performed by: STUDENT IN AN ORGANIZED HEALTH CARE EDUCATION/TRAINING PROGRAM

## 2018-01-01 PROCEDURE — 0TWB8DZ REVISION OF INTRALUMINAL DEVICE IN BLADDER, VIA NATURAL OR ARTIFICIAL OPENING ENDOSCOPIC: ICD-10-PCS | Performed by: UROLOGY

## 2018-01-01 PROCEDURE — G8988 SELF CARE GOAL STATUS: HCPCS

## 2018-01-01 PROCEDURE — 87088 URINE BACTERIA CULTURE: CPT

## 2018-01-01 PROCEDURE — 93290 INTERROG DEV EVAL ICPMS IP: CPT | Performed by: INTERNAL MEDICINE

## 2018-01-01 PROCEDURE — 83036 HEMOGLOBIN GLYCOSYLATED A1C: CPT

## 2018-01-01 PROCEDURE — G8417 CALC BMI ABV UP PARAM F/U: HCPCS | Performed by: PHYSICIAN ASSISTANT

## 2018-01-01 PROCEDURE — 70450 CT HEAD/BRAIN W/O DYE: CPT

## 2018-01-01 PROCEDURE — 94760 N-INVAS EAR/PLS OXIMETRY 1: CPT

## 2018-01-01 PROCEDURE — 2060000000 HC ICU INTERMEDIATE R&B

## 2018-01-01 PROCEDURE — 94660 CPAP INITIATION&MGMT: CPT

## 2018-01-01 PROCEDURE — 85018 HEMOGLOBIN: CPT

## 2018-01-01 PROCEDURE — 74018 RADEX ABDOMEN 1 VIEW: CPT

## 2018-01-01 PROCEDURE — 36569 INSJ PICC 5 YR+ W/O IMAGING: CPT

## 2018-01-01 PROCEDURE — 3700000000 HC ANESTHESIA ATTENDED CARE: Performed by: UROLOGY

## 2018-01-01 PROCEDURE — G8598 ASA/ANTIPLAT THER USED: HCPCS | Performed by: INTERNAL MEDICINE

## 2018-01-01 PROCEDURE — 83880 ASSAY OF NATRIURETIC PEPTIDE: CPT

## 2018-01-01 PROCEDURE — 87581 M.PNEUMON DNA AMP PROBE: CPT

## 2018-01-01 PROCEDURE — 6360000002 HC RX W HCPCS: Performed by: PHYSICIAN ASSISTANT

## 2018-01-01 PROCEDURE — 83605 ASSAY OF LACTIC ACID: CPT

## 2018-01-01 PROCEDURE — 99222 1ST HOSP IP/OBS MODERATE 55: CPT | Performed by: INTERNAL MEDICINE

## 2018-01-01 PROCEDURE — 2500000003 HC RX 250 WO HCPCS: Performed by: INTERNAL MEDICINE

## 2018-01-01 PROCEDURE — 99232 SBSQ HOSP IP/OBS MODERATE 35: CPT | Performed by: INTERNAL MEDICINE

## 2018-01-01 PROCEDURE — 97165 OT EVAL LOW COMPLEX 30 MIN: CPT

## 2018-01-01 PROCEDURE — 87798 DETECT AGENT NOS DNA AMP: CPT

## 2018-01-01 PROCEDURE — P9059 PLASMA, FRZ BETWEEN 8-24HOUR: HCPCS

## 2018-01-01 PROCEDURE — 71250 CT THORAX DX C-: CPT

## 2018-01-01 PROCEDURE — 1123F ACP DISCUSS/DSCN MKR DOCD: CPT | Performed by: NURSE PRACTITIONER

## 2018-01-01 PROCEDURE — G8484 FLU IMMUNIZE NO ADMIN: HCPCS | Performed by: NURSE PRACTITIONER

## 2018-01-01 PROCEDURE — 93000 ELECTROCARDIOGRAM COMPLETE: CPT | Performed by: INTERNAL MEDICINE

## 2018-01-01 PROCEDURE — 6370000000 HC RX 637 (ALT 250 FOR IP): Performed by: UROLOGY

## 2018-01-01 PROCEDURE — 99232 SBSQ HOSP IP/OBS MODERATE 35: CPT | Performed by: PHYSICIAN ASSISTANT

## 2018-01-01 PROCEDURE — 2709999900 HC NON-CHARGEABLE SUPPLY: Performed by: UROLOGY

## 2018-01-01 PROCEDURE — 99214 OFFICE O/P EST MOD 30 MIN: CPT | Performed by: NURSE PRACTITIONER

## 2018-01-01 PROCEDURE — 86901 BLOOD TYPING SEROLOGIC RH(D): CPT

## 2018-01-01 PROCEDURE — G8598 ASA/ANTIPLAT THER USED: HCPCS | Performed by: PHYSICIAN ASSISTANT

## 2018-01-01 PROCEDURE — APPSS180 APP SPLIT SHARED TIME > 60 MINUTES: Performed by: NURSE PRACTITIONER

## 2018-01-01 PROCEDURE — P9016 RBC LEUKOCYTES REDUCED: HCPCS

## 2018-01-01 PROCEDURE — 4040F PNEUMOC VAC/ADMIN/RCVD: CPT | Performed by: INTERNAL MEDICINE

## 2018-01-01 PROCEDURE — 85378 FIBRIN DEGRADE SEMIQUANT: CPT

## 2018-01-01 PROCEDURE — 86850 RBC ANTIBODY SCREEN: CPT

## 2018-01-01 PROCEDURE — 85730 THROMBOPLASTIN TIME PARTIAL: CPT

## 2018-01-01 PROCEDURE — 88305 TISSUE EXAM BY PATHOLOGIST: CPT

## 2018-01-01 PROCEDURE — 99214 OFFICE O/P EST MOD 30 MIN: CPT | Performed by: INTERNAL MEDICINE

## 2018-01-01 PROCEDURE — 82436 ASSAY OF URINE CHLORIDE: CPT

## 2018-01-01 PROCEDURE — G8987 SELF CARE CURRENT STATUS: HCPCS

## 2018-01-01 PROCEDURE — 94640 AIRWAY INHALATION TREATMENT: CPT

## 2018-01-01 PROCEDURE — 84484 ASSAY OF TROPONIN QUANT: CPT

## 2018-01-01 PROCEDURE — 82550 ASSAY OF CK (CPK): CPT

## 2018-01-01 PROCEDURE — 2140000000 HC CCU INTERMEDIATE R&B

## 2018-01-01 PROCEDURE — 1123F ACP DISCUSS/DSCN MKR DOCD: CPT | Performed by: PHYSICIAN ASSISTANT

## 2018-01-01 PROCEDURE — 3600000003 HC SURGERY LEVEL 3 BASE: Performed by: UROLOGY

## 2018-01-01 PROCEDURE — 6360000002 HC RX W HCPCS: Performed by: UROLOGY

## 2018-01-01 PROCEDURE — 2580000003 HC RX 258: Performed by: NURSE ANESTHETIST, CERTIFIED REGISTERED

## 2018-01-01 PROCEDURE — 87633 RESP VIRUS 12-25 TARGETS: CPT

## 2018-01-01 PROCEDURE — 6370000000 HC RX 637 (ALT 250 FOR IP): Performed by: PHYSICIAN ASSISTANT

## 2018-01-01 PROCEDURE — 83735 ASSAY OF MAGNESIUM: CPT

## 2018-01-01 PROCEDURE — 99283 EMERGENCY DEPT VISIT LOW MDM: CPT

## 2018-01-01 PROCEDURE — G8417 CALC BMI ABV UP PARAM F/U: HCPCS | Performed by: INTERNAL MEDICINE

## 2018-01-01 PROCEDURE — 1036F TOBACCO NON-USER: CPT | Performed by: INTERNAL MEDICINE

## 2018-01-01 PROCEDURE — 86923 COMPATIBILITY TEST ELECTRIC: CPT

## 2018-01-01 PROCEDURE — 93296 REM INTERROG EVL PM/IDS: CPT | Performed by: INTERNAL MEDICINE

## 2018-01-01 PROCEDURE — 87040 BLOOD CULTURE FOR BACTERIA: CPT

## 2018-01-01 PROCEDURE — 87186 SC STD MICRODIL/AGAR DIL: CPT

## 2018-01-01 PROCEDURE — APPSS60 APP SPLIT SHARED TIME 46-60 MINUTES: Performed by: NURSE PRACTITIONER

## 2018-01-01 PROCEDURE — G8417 CALC BMI ABV UP PARAM F/U: HCPCS | Performed by: NURSE PRACTITIONER

## 2018-01-01 PROCEDURE — 82947 ASSAY GLUCOSE BLOOD QUANT: CPT

## 2018-01-01 PROCEDURE — C2617 STENT, NON-COR, TEM W/O DEL: HCPCS | Performed by: UROLOGY

## 2018-01-01 PROCEDURE — 99223 1ST HOSP IP/OBS HIGH 75: CPT | Performed by: INTERNAL MEDICINE

## 2018-01-01 PROCEDURE — 93284 PRGRMG EVAL IMPLANTABLE DFB: CPT | Performed by: INTERNAL MEDICINE

## 2018-01-01 PROCEDURE — 6360000002 HC RX W HCPCS: Performed by: EMERGENCY MEDICINE

## 2018-01-01 PROCEDURE — C1769 GUIDE WIRE: HCPCS | Performed by: UROLOGY

## 2018-01-01 PROCEDURE — 1111F DSCHRG MED/CURRENT MED MERGE: CPT | Performed by: PHYSICIAN ASSISTANT

## 2018-01-01 PROCEDURE — 1101F PT FALLS ASSESS-DOCD LE1/YR: CPT | Performed by: PHYSICIAN ASSISTANT

## 2018-01-01 PROCEDURE — 87502 INFLUENZA DNA AMP PROBE: CPT

## 2018-01-01 PROCEDURE — 36592 COLLECT BLOOD FROM PICC: CPT

## 2018-01-01 PROCEDURE — 97116 GAIT TRAINING THERAPY: CPT

## 2018-01-01 PROCEDURE — 2580000003 HC RX 258: Performed by: STUDENT IN AN ORGANIZED HEALTH CARE EDUCATION/TRAINING PROGRAM

## 2018-01-01 PROCEDURE — 1036F TOBACCO NON-USER: CPT | Performed by: PHYSICIAN ASSISTANT

## 2018-01-01 PROCEDURE — 6360000002 HC RX W HCPCS: Performed by: STUDENT IN AN ORGANIZED HEALTH CARE EDUCATION/TRAINING PROGRAM

## 2018-01-01 PROCEDURE — G8427 DOCREV CUR MEDS BY ELIG CLIN: HCPCS | Performed by: INTERNAL MEDICINE

## 2018-01-01 PROCEDURE — 71046 X-RAY EXAM CHEST 2 VIEWS: CPT

## 2018-01-01 PROCEDURE — C1758 CATHETER, URETERAL: HCPCS | Performed by: UROLOGY

## 2018-01-01 PROCEDURE — 94761 N-INVAS EAR/PLS OXIMETRY MLT: CPT

## 2018-01-01 PROCEDURE — 51798 US URINE CAPACITY MEASURE: CPT

## 2018-01-01 PROCEDURE — 99213 OFFICE O/P EST LOW 20 MIN: CPT | Performed by: PHYSICIAN ASSISTANT

## 2018-01-01 PROCEDURE — G8598 ASA/ANTIPLAT THER USED: HCPCS | Performed by: NURSE PRACTITIONER

## 2018-01-01 PROCEDURE — 93005 ELECTROCARDIOGRAM TRACING: CPT | Performed by: EMERGENCY MEDICINE

## 2018-01-01 PROCEDURE — 76770 US EXAM ABDO BACK WALL COMP: CPT

## 2018-01-01 PROCEDURE — 6370000000 HC RX 637 (ALT 250 FOR IP)

## 2018-01-01 PROCEDURE — 96365 THER/PROPH/DIAG IV INF INIT: CPT

## 2018-01-01 PROCEDURE — 1036F TOBACCO NON-USER: CPT | Performed by: NURSE PRACTITIONER

## 2018-01-01 PROCEDURE — 74176 CT ABD & PELVIS W/O CONTRAST: CPT

## 2018-01-01 PROCEDURE — 82570 ASSAY OF URINE CREATININE: CPT

## 2018-01-01 PROCEDURE — 93297 REM INTERROG DEV EVAL ICPMS: CPT | Performed by: INTERNAL MEDICINE

## 2018-01-01 PROCEDURE — 6370000000 HC RX 637 (ALT 250 FOR IP): Performed by: STUDENT IN AN ORGANIZED HEALTH CARE EDUCATION/TRAINING PROGRAM

## 2018-01-01 PROCEDURE — 36430 TRANSFUSION BLD/BLD COMPNT: CPT

## 2018-01-01 PROCEDURE — 6360000002 HC RX W HCPCS: Performed by: NURSE ANESTHETIST, CERTIFIED REGISTERED

## 2018-01-01 PROCEDURE — G8979 MOBILITY GOAL STATUS: HCPCS

## 2018-01-01 PROCEDURE — 96366 THER/PROPH/DIAG IV INF ADDON: CPT

## 2018-01-01 PROCEDURE — 85014 HEMATOCRIT: CPT

## 2018-01-01 PROCEDURE — 1101F PT FALLS ASSESS-DOCD LE1/YR: CPT | Performed by: INTERNAL MEDICINE

## 2018-01-01 PROCEDURE — 84156 ASSAY OF PROTEIN URINE: CPT

## 2018-01-01 PROCEDURE — 87081 CULTURE SCREEN ONLY: CPT

## 2018-01-01 PROCEDURE — 3209999900 FLUORO FOR SURGICAL PROCEDURES

## 2018-01-01 PROCEDURE — 36600 WITHDRAWAL OF ARTERIAL BLOOD: CPT

## 2018-01-01 PROCEDURE — 87486 CHLMYD PNEUM DNA AMP PROBE: CPT

## 2018-01-01 PROCEDURE — 97166 OT EVAL MOD COMPLEX 45 MIN: CPT

## 2018-01-01 PROCEDURE — G0103 PSA SCREENING: HCPCS

## 2018-01-01 PROCEDURE — C9113 INJ PANTOPRAZOLE SODIUM, VIA: HCPCS | Performed by: INTERNAL MEDICINE

## 2018-01-01 PROCEDURE — 6360000002 HC RX W HCPCS: Performed by: NURSE PRACTITIONER

## 2018-01-01 PROCEDURE — 93005 ELECTROCARDIOGRAM TRACING: CPT | Performed by: INTERNAL MEDICINE

## 2018-01-01 PROCEDURE — 2500000003 HC RX 250 WO HCPCS: Performed by: EMERGENCY MEDICINE

## 2018-01-01 PROCEDURE — 4040F PNEUMOC VAC/ADMIN/RCVD: CPT | Performed by: NURSE PRACTITIONER

## 2018-01-01 PROCEDURE — 1111F DSCHRG MED/CURRENT MED MERGE: CPT | Performed by: INTERNAL MEDICINE

## 2018-01-01 PROCEDURE — 3E1K78Z IRRIGATION OF GENITOURINARY TRACT USING IRRIGATING SUBSTANCE, VIA NATURAL OR ARTIFICIAL OPENING: ICD-10-PCS | Performed by: INTERNAL MEDICINE

## 2018-01-01 PROCEDURE — 99213 OFFICE O/P EST LOW 20 MIN: CPT | Performed by: INTERNAL MEDICINE

## 2018-01-01 PROCEDURE — 3700000001 HC ADD 15 MINUTES (ANESTHESIA): Performed by: UROLOGY

## 2018-01-01 PROCEDURE — 94664 DEMO&/EVAL PT USE INHALER: CPT

## 2018-01-01 PROCEDURE — 93005 ELECTROCARDIOGRAM TRACING: CPT

## 2018-01-01 PROCEDURE — 93005 ELECTROCARDIOGRAM TRACING: CPT | Performed by: STUDENT IN AN ORGANIZED HEALTH CARE EDUCATION/TRAINING PROGRAM

## 2018-01-01 PROCEDURE — 84145 PROCALCITONIN (PCT): CPT

## 2018-01-01 PROCEDURE — APPSS45 APP SPLIT SHARED TIME 31-45 MINUTES: Performed by: PHYSICIAN ASSISTANT

## 2018-01-01 PROCEDURE — 84300 ASSAY OF URINE SODIUM: CPT

## 2018-01-01 PROCEDURE — 96368 THER/DIAG CONCURRENT INF: CPT

## 2018-01-01 PROCEDURE — 02H633Z INSERTION OF INFUSION DEVICE INTO RIGHT ATRIUM, PERCUTANEOUS APPROACH: ICD-10-PCS | Performed by: EMERGENCY MEDICINE

## 2018-01-01 PROCEDURE — 96374 THER/PROPH/DIAG INJ IV PUSH: CPT

## 2018-01-01 PROCEDURE — G8427 DOCREV CUR MEDS BY ELIG CLIN: HCPCS | Performed by: NURSE PRACTITIONER

## 2018-01-01 PROCEDURE — 99221 1ST HOSP IP/OBS SF/LOW 40: CPT | Performed by: INTERNAL MEDICINE

## 2018-01-01 PROCEDURE — 0TBB8ZX EXCISION OF BLADDER, VIA NATURAL OR ARTIFICIAL OPENING ENDOSCOPIC, DIAGNOSTIC: ICD-10-PCS | Performed by: UROLOGY

## 2018-01-01 PROCEDURE — G8427 DOCREV CUR MEDS BY ELIG CLIN: HCPCS | Performed by: PHYSICIAN ASSISTANT

## 2018-01-01 PROCEDURE — 97162 PT EVAL MOD COMPLEX 30 MIN: CPT

## 2018-01-01 PROCEDURE — 93295 DEV INTERROG REMOTE 1/2/MLT: CPT | Performed by: INTERNAL MEDICINE

## 2018-01-01 PROCEDURE — 87450 HC DIRECT STREP B ANTIGEN: CPT

## 2018-01-01 PROCEDURE — 4040F PNEUMOC VAC/ADMIN/RCVD: CPT | Performed by: PHYSICIAN ASSISTANT

## 2018-01-01 PROCEDURE — 7100000011 HC PHASE II RECOVERY - ADDTL 15 MIN: Performed by: UROLOGY

## 2018-01-01 PROCEDURE — 74420 UROGRAPHY RTRGR +-KUB: CPT

## 2018-01-01 DEVICE — URETERAL STENT
Type: IMPLANTABLE DEVICE | Site: URETER | Status: FUNCTIONAL
Brand: POLARIS™ ULTRA

## 2018-01-01 RX ORDER — DEXTROSE MONOHYDRATE 50 MG/ML
100 INJECTION, SOLUTION INTRAVENOUS PRN
Status: DISCONTINUED | OUTPATIENT
Start: 2018-01-01 | End: 2018-01-01 | Stop reason: HOSPADM

## 2018-01-01 RX ORDER — CEPHALEXIN 500 MG/1
500 CAPSULE ORAL 2 TIMES DAILY
Qty: 10 CAPSULE | Refills: 0 | Status: SHIPPED | OUTPATIENT
Start: 2018-01-01 | End: 2018-01-01

## 2018-01-01 RX ORDER — DEXTROSE MONOHYDRATE 25 G/50ML
12.5 INJECTION, SOLUTION INTRAVENOUS PRN
Status: DISCONTINUED | OUTPATIENT
Start: 2018-01-01 | End: 2018-01-01 | Stop reason: HOSPADM

## 2018-01-01 RX ORDER — ASPIRIN 81 MG/1
81 TABLET, CHEWABLE ORAL NIGHTLY
Status: DISCONTINUED | OUTPATIENT
Start: 2018-01-01 | End: 2018-01-01

## 2018-01-01 RX ORDER — POTASSIUM CHLORIDE 20 MEQ/1
20 TABLET, EXTENDED RELEASE ORAL
Status: DISCONTINUED | OUTPATIENT
Start: 2018-01-01 | End: 2018-01-01 | Stop reason: HOSPADM

## 2018-01-01 RX ORDER — BUMETANIDE 0.25 MG/ML
2 INJECTION, SOLUTION INTRAMUSCULAR; INTRAVENOUS 2 TIMES DAILY
Status: DISCONTINUED | OUTPATIENT
Start: 2018-01-01 | End: 2018-01-01

## 2018-01-01 RX ORDER — WARFARIN SODIUM 2.5 MG/1
2.5 TABLET ORAL
Status: COMPLETED | OUTPATIENT
Start: 2018-01-01 | End: 2018-01-01

## 2018-01-01 RX ORDER — NICOTINE POLACRILEX 4 MG
15 LOZENGE BUCCAL PRN
Status: DISCONTINUED | OUTPATIENT
Start: 2018-01-01 | End: 2018-01-01 | Stop reason: HOSPADM

## 2018-01-01 RX ORDER — ONDANSETRON 2 MG/ML
INJECTION INTRAMUSCULAR; INTRAVENOUS PRN
Status: DISCONTINUED | OUTPATIENT
Start: 2018-01-01 | End: 2018-01-01 | Stop reason: SDUPTHER

## 2018-01-01 RX ORDER — ALPRAZOLAM 0.25 MG/1
0.25 TABLET ORAL EVERY 6 HOURS PRN
Status: DISCONTINUED | OUTPATIENT
Start: 2018-01-01 | End: 2018-01-01 | Stop reason: HOSPADM

## 2018-01-01 RX ORDER — INSULIN GLARGINE 100 [IU]/ML
20 INJECTION, SOLUTION SUBCUTANEOUS NIGHTLY
Status: DISCONTINUED | OUTPATIENT
Start: 2018-01-01 | End: 2018-01-01

## 2018-01-01 RX ORDER — MEXILETINE HYDROCHLORIDE 250 MG/1
250 CAPSULE ORAL 3 TIMES DAILY
Status: DISCONTINUED | OUTPATIENT
Start: 2018-01-01 | End: 2018-01-01 | Stop reason: HOSPADM

## 2018-01-01 RX ORDER — BISACODYL 10 MG
10 SUPPOSITORY, RECTAL RECTAL DAILY PRN
Status: DISCONTINUED | OUTPATIENT
Start: 2018-01-01 | End: 2018-01-01 | Stop reason: HOSPADM

## 2018-01-01 RX ORDER — INSULIN GLARGINE 100 [IU]/ML
25 INJECTION, SOLUTION SUBCUTANEOUS DAILY
Status: DISCONTINUED | OUTPATIENT
Start: 2018-01-01 | End: 2018-01-01 | Stop reason: HOSPADM

## 2018-01-01 RX ORDER — FUROSEMIDE 10 MG/ML
40 INJECTION INTRAMUSCULAR; INTRAVENOUS ONCE
Status: COMPLETED | OUTPATIENT
Start: 2018-01-01 | End: 2018-01-01

## 2018-01-01 RX ORDER — SODIUM CHLORIDE 9 MG/ML
1000 INJECTION, SOLUTION INTRAVENOUS ONCE
Status: COMPLETED | OUTPATIENT
Start: 2018-01-01 | End: 2018-01-01

## 2018-01-01 RX ORDER — WARFARIN SODIUM 4 MG/1
3 TABLET ORAL
COMMUNITY
End: 2018-01-01 | Stop reason: DRUGHIGH

## 2018-01-01 RX ORDER — HYDRALAZINE HYDROCHLORIDE 25 MG/1
25 TABLET, FILM COATED ORAL EVERY 8 HOURS SCHEDULED
Status: DISCONTINUED | OUTPATIENT
Start: 2018-01-01 | End: 2018-01-01 | Stop reason: HOSPADM

## 2018-01-01 RX ORDER — METOPROLOL SUCCINATE 100 MG/1
200 TABLET, EXTENDED RELEASE ORAL 2 TIMES DAILY
Status: DISCONTINUED | OUTPATIENT
Start: 2018-01-01 | End: 2018-01-01 | Stop reason: HOSPADM

## 2018-01-01 RX ORDER — ALLOPURINOL 300 MG/1
300 TABLET ORAL
Status: DISCONTINUED | OUTPATIENT
Start: 2018-01-01 | End: 2018-01-01 | Stop reason: HOSPADM

## 2018-01-01 RX ORDER — SODIUM CHLORIDE 0.9 % (FLUSH) 0.9 %
10 SYRINGE (ML) INJECTION PRN
Status: DISCONTINUED | OUTPATIENT
Start: 2018-01-01 | End: 2018-01-01 | Stop reason: HOSPADM

## 2018-01-01 RX ORDER — CEFDINIR 300 MG/1
300 CAPSULE ORAL DAILY
Qty: 10 CAPSULE | Refills: 0 | Status: SHIPPED | OUTPATIENT
Start: 2018-01-01 | End: 2018-01-01

## 2018-01-01 RX ORDER — MIRTAZAPINE 15 MG/1
30 TABLET, FILM COATED ORAL NIGHTLY
Status: DISCONTINUED | OUTPATIENT
Start: 2018-01-01 | End: 2018-01-01 | Stop reason: HOSPADM

## 2018-01-01 RX ORDER — MEXILETINE HYDROCHLORIDE 250 MG/1
250 CAPSULE ORAL EVERY 8 HOURS SCHEDULED
Status: DISCONTINUED | OUTPATIENT
Start: 2018-01-01 | End: 2018-01-01 | Stop reason: HOSPADM

## 2018-01-01 RX ORDER — SODIUM CHLORIDE 0.9 % (FLUSH) 0.9 %
10 SYRINGE (ML) INJECTION EVERY 12 HOURS SCHEDULED
Status: DISCONTINUED | OUTPATIENT
Start: 2018-01-01 | End: 2018-01-01 | Stop reason: HOSPADM

## 2018-01-01 RX ORDER — 0.9 % SODIUM CHLORIDE 0.9 %
1000 INTRAVENOUS SOLUTION INTRAVENOUS ONCE
Status: COMPLETED | OUTPATIENT
Start: 2018-01-01 | End: 2018-01-01

## 2018-01-01 RX ORDER — WARFARIN SODIUM 3 MG/1
3 TABLET ORAL ONCE
Status: DISCONTINUED | OUTPATIENT
Start: 2018-01-01 | End: 2018-01-01

## 2018-01-01 RX ORDER — POTASSIUM CHLORIDE 20 MEQ/1
20 TABLET, EXTENDED RELEASE ORAL 2 TIMES DAILY
Status: DISCONTINUED | OUTPATIENT
Start: 2018-01-01 | End: 2018-01-01 | Stop reason: HOSPADM

## 2018-01-01 RX ORDER — HYDRALAZINE HYDROCHLORIDE 25 MG/1
25 TABLET, FILM COATED ORAL 3 TIMES DAILY
Status: DISCONTINUED | OUTPATIENT
Start: 2018-01-01 | End: 2018-01-01 | Stop reason: HOSPADM

## 2018-01-01 RX ORDER — SODIUM CHLORIDE 9 MG/ML
INJECTION, SOLUTION INTRAVENOUS CONTINUOUS
Status: DISCONTINUED | OUTPATIENT
Start: 2018-01-01 | End: 2018-01-01

## 2018-01-01 RX ORDER — ISOSORBIDE MONONITRATE 60 MG/1
120 TABLET, EXTENDED RELEASE ORAL DAILY
Status: DISCONTINUED | OUTPATIENT
Start: 2018-01-01 | End: 2018-01-01 | Stop reason: HOSPADM

## 2018-01-01 RX ORDER — AMOXICILLIN AND CLAVULANATE POTASSIUM 875; 125 MG/1; MG/1
1 TABLET, FILM COATED ORAL EVERY 12 HOURS SCHEDULED
Qty: 20 TABLET | Refills: 0 | Status: SHIPPED | OUTPATIENT
Start: 2018-01-01 | End: 2018-01-01

## 2018-01-01 RX ORDER — INSULIN GLARGINE 100 [IU]/ML
40 INJECTION, SOLUTION SUBCUTANEOUS 2 TIMES DAILY
Status: DISCONTINUED | OUTPATIENT
Start: 2018-01-01 | End: 2018-01-01 | Stop reason: HOSPADM

## 2018-01-01 RX ORDER — 0.9 % SODIUM CHLORIDE 0.9 %
500 INTRAVENOUS SOLUTION INTRAVENOUS ONCE
Status: COMPLETED | OUTPATIENT
Start: 2018-01-01 | End: 2018-01-01

## 2018-01-01 RX ORDER — MEXILETINE HYDROCHLORIDE 200 MG/1
200 CAPSULE ORAL EVERY 8 HOURS SCHEDULED
Status: DISCONTINUED | OUTPATIENT
Start: 2018-01-01 | End: 2018-01-01 | Stop reason: HOSPADM

## 2018-01-01 RX ORDER — ONDANSETRON 2 MG/ML
4 INJECTION INTRAMUSCULAR; INTRAVENOUS EVERY 6 HOURS PRN
Status: DISCONTINUED | OUTPATIENT
Start: 2018-01-01 | End: 2018-01-01 | Stop reason: HOSPADM

## 2018-01-01 RX ORDER — SENNA PLUS 8.6 MG/1
1 TABLET ORAL NIGHTLY
Status: DISCONTINUED | OUTPATIENT
Start: 2018-01-01 | End: 2018-01-01

## 2018-01-01 RX ORDER — MORPHINE SULFATE 2 MG/ML
2 INJECTION, SOLUTION INTRAMUSCULAR; INTRAVENOUS EVERY 6 HOURS PRN
Status: DISCONTINUED | OUTPATIENT
Start: 2018-01-01 | End: 2018-01-01 | Stop reason: HOSPADM

## 2018-01-01 RX ORDER — CIPROFLOXACIN 500 MG/1
250 TABLET, FILM COATED ORAL 2 TIMES DAILY
Qty: 10 TABLET | Refills: 0 | Status: ON HOLD | OUTPATIENT
Start: 2018-01-01 | End: 2018-01-01 | Stop reason: HOSPADM

## 2018-01-01 RX ORDER — INSULIN GLARGINE 100 [IU]/ML
20 INJECTION, SOLUTION SUBCUTANEOUS NIGHTLY
Status: DISCONTINUED | OUTPATIENT
Start: 2018-01-01 | End: 2018-01-01 | Stop reason: HOSPADM

## 2018-01-01 RX ORDER — BUMETANIDE 1 MG/1
2 TABLET ORAL 2 TIMES DAILY
Status: DISCONTINUED | OUTPATIENT
Start: 2018-01-01 | End: 2018-01-01 | Stop reason: HOSPADM

## 2018-01-01 RX ORDER — PHYTONADIONE 10 MG/ML
10 INJECTION, EMULSION INTRAMUSCULAR; INTRAVENOUS; SUBCUTANEOUS ONCE
Status: COMPLETED | OUTPATIENT
Start: 2018-01-01 | End: 2018-01-01

## 2018-01-01 RX ORDER — AMOXICILLIN AND CLAVULANATE POTASSIUM 875; 125 MG/1; MG/1
1 TABLET, FILM COATED ORAL EVERY 12 HOURS SCHEDULED
Status: DISCONTINUED | OUTPATIENT
Start: 2018-01-01 | End: 2018-01-01 | Stop reason: HOSPADM

## 2018-01-01 RX ORDER — AZELASTINE 1 MG/ML
1 SPRAY, METERED NASAL 2 TIMES DAILY PRN
Status: DISCONTINUED | OUTPATIENT
Start: 2018-01-01 | End: 2018-01-01

## 2018-01-01 RX ORDER — ISOSORBIDE MONONITRATE 60 MG/1
60 TABLET, EXTENDED RELEASE ORAL 2 TIMES DAILY
Status: DISCONTINUED | OUTPATIENT
Start: 2018-01-01 | End: 2018-01-01 | Stop reason: HOSPADM

## 2018-01-01 RX ORDER — SODIUM CHLORIDE 0.9 % (FLUSH) 0.9 %
10 SYRINGE (ML) INJECTION PRN
Status: DISCONTINUED | OUTPATIENT
Start: 2018-01-01 | End: 2018-01-01 | Stop reason: SDUPTHER

## 2018-01-01 RX ORDER — INSULIN GLARGINE 100 [IU]/ML
25 INJECTION, SOLUTION SUBCUTANEOUS DAILY
Qty: 1 VIAL | Refills: 3 | DISCHARGE
Start: 2018-01-01 | End: 2018-01-01 | Stop reason: ALTCHOICE

## 2018-01-01 RX ORDER — MEPERIDINE HYDROCHLORIDE 25 MG/ML
12.5 INJECTION INTRAMUSCULAR; INTRAVENOUS; SUBCUTANEOUS
Status: DISCONTINUED | OUTPATIENT
Start: 2018-01-01 | End: 2018-01-01 | Stop reason: HOSPADM

## 2018-01-01 RX ORDER — FENTANYL CITRATE 50 UG/ML
25 INJECTION, SOLUTION INTRAMUSCULAR; INTRAVENOUS EVERY 5 MIN PRN
Status: DISCONTINUED | OUTPATIENT
Start: 2018-01-01 | End: 2018-01-01 | Stop reason: HOSPADM

## 2018-01-01 RX ORDER — BUMETANIDE 1 MG/1
2 TABLET ORAL DAILY
Status: DISCONTINUED | OUTPATIENT
Start: 2018-01-01 | End: 2018-01-01 | Stop reason: HOSPADM

## 2018-01-01 RX ORDER — MEXILETINE HYDROCHLORIDE 250 MG/1
250 CAPSULE ORAL EVERY 12 HOURS SCHEDULED
Status: DISCONTINUED | OUTPATIENT
Start: 2018-01-01 | End: 2018-01-01 | Stop reason: HOSPADM

## 2018-01-01 RX ORDER — FUROSEMIDE 10 MG/ML
20 INJECTION INTRAMUSCULAR; INTRAVENOUS ONCE
Status: DISCONTINUED | OUTPATIENT
Start: 2018-01-01 | End: 2018-01-01

## 2018-01-01 RX ORDER — FERROUS SULFATE 325(65) MG
325 TABLET ORAL
COMMUNITY
End: 2018-01-01

## 2018-01-01 RX ORDER — SODIUM CHLORIDE 0.9 % (FLUSH) 0.9 %
10 SYRINGE (ML) INJECTION EVERY 12 HOURS SCHEDULED
Status: DISCONTINUED | OUTPATIENT
Start: 2018-01-01 | End: 2018-01-01 | Stop reason: SDUPTHER

## 2018-01-01 RX ORDER — WARFARIN SODIUM 3 MG/1
3 TABLET ORAL NIGHTLY
COMMUNITY
End: 2018-01-01

## 2018-01-01 RX ORDER — FERROUS SULFATE 325(65) MG
325 TABLET ORAL
Status: DISCONTINUED | OUTPATIENT
Start: 2018-01-01 | End: 2018-01-01 | Stop reason: HOSPADM

## 2018-01-01 RX ORDER — SULFAMETHOXAZOLE AND TRIMETHOPRIM 800; 160 MG/1; MG/1
0.5 TABLET ORAL 4 TIMES DAILY
Status: ON HOLD | COMMUNITY
Start: 2018-01-01 | End: 2018-01-01 | Stop reason: HOSPADM

## 2018-01-01 RX ORDER — PANTOPRAZOLE SODIUM 40 MG/1
40 TABLET, DELAYED RELEASE ORAL
Status: DISCONTINUED | OUTPATIENT
Start: 2018-01-01 | End: 2018-01-01 | Stop reason: HOSPADM

## 2018-01-01 RX ORDER — FUROSEMIDE 10 MG/ML
20 INJECTION INTRAMUSCULAR; INTRAVENOUS ONCE
Status: COMPLETED | OUTPATIENT
Start: 2018-01-01 | End: 2018-01-01

## 2018-01-01 RX ORDER — FLUCONAZOLE 100 MG/1
200 TABLET ORAL DAILY
Status: DISCONTINUED | OUTPATIENT
Start: 2018-01-01 | End: 2018-01-01

## 2018-01-01 RX ORDER — HYDROCODONE BITARTRATE AND ACETAMINOPHEN 5; 325 MG/1; MG/1
1 TABLET ORAL EVERY 4 HOURS PRN
Status: DISCONTINUED | OUTPATIENT
Start: 2018-01-01 | End: 2018-01-01 | Stop reason: HOSPADM

## 2018-01-01 RX ORDER — SODIUM CHLORIDE 0.9 % (FLUSH) 0.9 %
10 SYRINGE (ML) INJECTION EVERY 12 HOURS SCHEDULED
Status: DISCONTINUED | OUTPATIENT
Start: 2018-01-01 | End: 2018-01-01

## 2018-01-01 RX ORDER — DEXTROSE MONOHYDRATE 25 G/50ML
25 INJECTION, SOLUTION INTRAVENOUS ONCE
Status: COMPLETED | OUTPATIENT
Start: 2018-01-01 | End: 2018-01-01

## 2018-01-01 RX ORDER — NITROGLYCERIN 0.4 MG/1
TABLET SUBLINGUAL
Status: COMPLETED
Start: 2018-01-01 | End: 2018-01-01

## 2018-01-01 RX ORDER — SENNA PLUS 8.6 MG/1
2 TABLET ORAL 2 TIMES DAILY
Status: DISCONTINUED | OUTPATIENT
Start: 2018-01-01 | End: 2018-01-01 | Stop reason: HOSPADM

## 2018-01-01 RX ORDER — SENNA PLUS 8.6 MG/1
TABLET ORAL
Status: COMPLETED
Start: 2018-01-01 | End: 2018-01-01

## 2018-01-01 RX ORDER — BUMETANIDE 2 MG/1
2 TABLET ORAL DAILY
Qty: 30 TABLET | Refills: 3 | Status: SHIPPED | OUTPATIENT
Start: 2018-01-01

## 2018-01-01 RX ORDER — SODIUM CHLORIDE 9 MG/ML
INJECTION, SOLUTION INTRAVENOUS ONCE
Status: DISCONTINUED | OUTPATIENT
Start: 2018-01-01 | End: 2018-01-01

## 2018-01-01 RX ORDER — ACETAMINOPHEN 325 MG/1
650 TABLET ORAL EVERY 4 HOURS PRN
Status: DISCONTINUED | OUTPATIENT
Start: 2018-01-01 | End: 2018-01-01 | Stop reason: HOSPADM

## 2018-01-01 RX ORDER — POTASSIUM CHLORIDE 20 MEQ/1
20 TABLET, EXTENDED RELEASE ORAL 2 TIMES DAILY
Status: DISCONTINUED | OUTPATIENT
Start: 2018-01-01 | End: 2018-01-01

## 2018-01-01 RX ORDER — ASPIRIN 81 MG/1
81 TABLET, CHEWABLE ORAL DAILY
Status: DISCONTINUED | OUTPATIENT
Start: 2018-01-01 | End: 2018-01-01 | Stop reason: HOSPADM

## 2018-01-01 RX ORDER — WARFARIN SODIUM 7.5 MG/1
7.5 TABLET ORAL DAILY
Status: DISCONTINUED | OUTPATIENT
Start: 2018-01-01 | End: 2018-01-01 | Stop reason: HOSPADM

## 2018-01-01 RX ORDER — ONDANSETRON 2 MG/ML
4 INJECTION INTRAMUSCULAR; INTRAVENOUS EVERY 8 HOURS PRN
Status: DISCONTINUED | OUTPATIENT
Start: 2018-01-01 | End: 2018-01-01 | Stop reason: HOSPADM

## 2018-01-01 RX ORDER — PANTOPRAZOLE SODIUM 40 MG/10ML
40 INJECTION, POWDER, LYOPHILIZED, FOR SOLUTION INTRAVENOUS DAILY
Status: DISCONTINUED | OUTPATIENT
Start: 2018-01-01 | End: 2018-01-01

## 2018-01-01 RX ORDER — BUMETANIDE 2 MG/1
2 TABLET ORAL 2 TIMES DAILY
Qty: 30 TABLET | Refills: 3 | Status: ON HOLD | DISCHARGE
Start: 2018-01-01 | End: 2018-01-01

## 2018-01-01 RX ORDER — OSELTAMIVIR PHOSPHATE 30 MG/1
30 CAPSULE ORAL 2 TIMES DAILY
Qty: 6 CAPSULE | Refills: 0 | Status: SHIPPED | OUTPATIENT
Start: 2018-01-01 | End: 2018-01-01

## 2018-01-01 RX ORDER — WARFARIN SODIUM 3 MG/1
4.5 TABLET ORAL
Status: ON HOLD | COMMUNITY
End: 2018-01-01 | Stop reason: HOSPADM

## 2018-01-01 RX ORDER — FLUCONAZOLE 100 MG/1
100 TABLET ORAL DAILY
Qty: 7 TABLET | Refills: 0 | Status: SHIPPED | OUTPATIENT
Start: 2018-01-01 | End: 2018-01-01

## 2018-01-01 RX ORDER — FUROSEMIDE 10 MG/ML
40 INJECTION INTRAMUSCULAR; INTRAVENOUS DAILY
Status: DISCONTINUED | OUTPATIENT
Start: 2018-01-01 | End: 2018-01-01

## 2018-01-01 RX ORDER — ALLOPURINOL 300 MG/1
300 TABLET ORAL DAILY
Status: DISCONTINUED | OUTPATIENT
Start: 2018-01-01 | End: 2018-01-01 | Stop reason: HOSPADM

## 2018-01-01 RX ORDER — CALCIUM CARBONATE 200(500)MG
500 TABLET,CHEWABLE ORAL 3 TIMES DAILY PRN
Status: DISCONTINUED | OUTPATIENT
Start: 2018-01-01 | End: 2018-01-01

## 2018-01-01 RX ORDER — DIPHENHYDRAMINE HYDROCHLORIDE 50 MG/ML
12.5 INJECTION INTRAMUSCULAR; INTRAVENOUS
Status: DISCONTINUED | OUTPATIENT
Start: 2018-01-01 | End: 2018-01-01 | Stop reason: HOSPADM

## 2018-01-01 RX ORDER — BISACODYL 10 MG
10 SUPPOSITORY, RECTAL RECTAL DAILY PRN
Status: ON HOLD | COMMUNITY
End: 2018-01-01 | Stop reason: HOSPADM

## 2018-01-01 RX ORDER — FLUCONAZOLE 200 MG/1
200 TABLET ORAL DAILY
Qty: 7 TABLET | Refills: 0 | Status: ON HOLD | DISCHARGE
Start: 2018-01-01 | End: 2018-01-01 | Stop reason: HOSPADM

## 2018-01-01 RX ORDER — FLUCONAZOLE 100 MG/1
200 TABLET ORAL ONCE
Status: COMPLETED | OUTPATIENT
Start: 2018-01-01 | End: 2018-01-01

## 2018-01-01 RX ORDER — FLUCONAZOLE 2 MG/ML
200 INJECTION, SOLUTION INTRAVENOUS EVERY 24 HOURS
Status: DISCONTINUED | OUTPATIENT
Start: 2018-01-01 | End: 2018-01-01 | Stop reason: HOSPADM

## 2018-01-01 RX ORDER — INSULIN GLARGINE 100 [IU]/ML
50 INJECTION, SOLUTION SUBCUTANEOUS 2 TIMES DAILY
Status: DISCONTINUED | OUTPATIENT
Start: 2018-01-01 | End: 2018-01-01

## 2018-01-01 RX ORDER — WARFARIN SODIUM 5 MG/1
5 TABLET ORAL
Status: COMPLETED | OUTPATIENT
Start: 2018-01-01 | End: 2018-01-01

## 2018-01-01 RX ORDER — FENTANYL CITRATE 50 UG/ML
50 INJECTION, SOLUTION INTRAMUSCULAR; INTRAVENOUS EVERY 5 MIN PRN
Status: DISCONTINUED | OUTPATIENT
Start: 2018-01-01 | End: 2018-01-01 | Stop reason: HOSPADM

## 2018-01-01 RX ORDER — PETROLATUM 42 G/100G
OINTMENT TOPICAL 3 TIMES DAILY PRN
Status: DISCONTINUED | OUTPATIENT
Start: 2018-01-01 | End: 2018-01-01 | Stop reason: HOSPADM

## 2018-01-01 RX ORDER — GLYCOPYRROLATE 0.2 MG/ML
0.2 INJECTION INTRAMUSCULAR; INTRAVENOUS ONCE
Status: COMPLETED | OUTPATIENT
Start: 2018-01-01 | End: 2018-01-01

## 2018-01-01 RX ORDER — PROPOFOL 10 MG/ML
INJECTION, EMULSION INTRAVENOUS PRN
Status: DISCONTINUED | OUTPATIENT
Start: 2018-01-01 | End: 2018-01-01 | Stop reason: SDUPTHER

## 2018-01-01 RX ORDER — 0.9 % SODIUM CHLORIDE 0.9 %
10 VIAL (ML) INJECTION DAILY
Status: DISCONTINUED | OUTPATIENT
Start: 2018-01-01 | End: 2018-01-01

## 2018-01-01 RX ORDER — PHENAZOPYRIDINE HYDROCHLORIDE 100 MG/1
100 TABLET, FILM COATED ORAL ONCE
Status: COMPLETED | OUTPATIENT
Start: 2018-01-01 | End: 2018-01-01

## 2018-01-01 RX ORDER — BUMETANIDE 1 MG/1
2 TABLET ORAL 2 TIMES DAILY
Status: DISCONTINUED | OUTPATIENT
Start: 2018-01-01 | End: 2018-01-01

## 2018-01-01 RX ORDER — IPRATROPIUM BROMIDE AND ALBUTEROL SULFATE 2.5; .5 MG/3ML; MG/3ML
1 SOLUTION RESPIRATORY (INHALATION)
Status: DISCONTINUED | OUTPATIENT
Start: 2018-01-01 | End: 2018-01-01 | Stop reason: HOSPADM

## 2018-01-01 RX ORDER — DEXTROSE AND SODIUM CHLORIDE 5; .45 G/100ML; G/100ML
INJECTION, SOLUTION INTRAVENOUS CONTINUOUS
Status: DISCONTINUED | OUTPATIENT
Start: 2018-01-01 | End: 2018-01-01

## 2018-01-01 RX ORDER — HEPARIN SODIUM 5000 [USP'U]/ML
5000 INJECTION, SOLUTION INTRAVENOUS; SUBCUTANEOUS EVERY 8 HOURS SCHEDULED
Status: DISCONTINUED | OUTPATIENT
Start: 2018-01-01 | End: 2018-01-01

## 2018-01-01 RX ORDER — ALBUTEROL SULFATE 2.5 MG/3ML
2.5 SOLUTION RESPIRATORY (INHALATION) EVERY 4 HOURS PRN
Status: DISCONTINUED | OUTPATIENT
Start: 2018-01-01 | End: 2018-01-01 | Stop reason: HOSPADM

## 2018-01-01 RX ORDER — METOPROLOL SUCCINATE 50 MG/1
50 TABLET, EXTENDED RELEASE ORAL 2 TIMES DAILY
Status: DISCONTINUED | OUTPATIENT
Start: 2018-01-01 | End: 2018-01-01 | Stop reason: HOSPADM

## 2018-01-01 RX ORDER — OXYCODONE HYDROCHLORIDE AND ACETAMINOPHEN 5; 325 MG/1; MG/1
1 TABLET ORAL EVERY 4 HOURS PRN
Qty: 10 TABLET | Refills: 0 | Status: SHIPPED | OUTPATIENT
Start: 2018-01-01 | End: 2018-01-01

## 2018-01-01 RX ORDER — INSULIN GLARGINE 100 [IU]/ML
25 INJECTION, SOLUTION SUBCUTANEOUS EVERY MORNING
Status: DISCONTINUED | OUTPATIENT
Start: 2018-01-01 | End: 2018-01-01 | Stop reason: HOSPADM

## 2018-01-01 RX ORDER — PROPOFOL 10 MG/ML
INJECTION, EMULSION INTRAVENOUS CONTINUOUS PRN
Status: DISCONTINUED | OUTPATIENT
Start: 2018-01-01 | End: 2018-01-01 | Stop reason: SDUPTHER

## 2018-01-01 RX ORDER — MIRTAZAPINE 15 MG/1
30 TABLET, FILM COATED ORAL NIGHTLY
Status: DISCONTINUED | OUTPATIENT
Start: 2018-01-01 | End: 2018-01-01

## 2018-01-01 RX ORDER — WARFARIN SODIUM 2.5 MG/1
TABLET ORAL
COMMUNITY
End: 2018-01-01 | Stop reason: DRUGHIGH

## 2018-01-01 RX ORDER — CEFAZOLIN SODIUM 1 G/50ML
1 SOLUTION INTRAVENOUS EVERY 8 HOURS
Status: DISCONTINUED | OUTPATIENT
Start: 2018-01-01 | End: 2018-01-01

## 2018-01-01 RX ORDER — WARFARIN SODIUM 3 MG/1
4.5 TABLET ORAL
COMMUNITY
End: 2018-01-01 | Stop reason: DRUGHIGH

## 2018-01-01 RX ORDER — SODIUM CHLORIDE 0.9 % (FLUSH) 0.9 %
10 SYRINGE (ML) INJECTION PRN
Status: DISCONTINUED | OUTPATIENT
Start: 2018-01-01 | End: 2018-01-01

## 2018-01-01 RX ORDER — PROMETHAZINE HYDROCHLORIDE 25 MG/ML
6.25 INJECTION, SOLUTION INTRAMUSCULAR; INTRAVENOUS
Status: DISCONTINUED | OUTPATIENT
Start: 2018-01-01 | End: 2018-01-01 | Stop reason: HOSPADM

## 2018-01-01 RX ORDER — OSELTAMIVIR PHOSPHATE 30 MG/1
30 CAPSULE ORAL ONCE
Status: COMPLETED | OUTPATIENT
Start: 2018-01-01 | End: 2018-01-01

## 2018-01-01 RX ORDER — POLYETHYLENE GLYCOL 3350 17 G/17G
17 POWDER, FOR SOLUTION ORAL ONCE
Status: COMPLETED | OUTPATIENT
Start: 2018-01-01 | End: 2018-01-01

## 2018-01-01 RX ORDER — TRAMADOL HYDROCHLORIDE 50 MG/1
50 TABLET ORAL EVERY 6 HOURS PRN
COMMUNITY

## 2018-01-01 RX ORDER — ONDANSETRON 2 MG/ML
4 INJECTION INTRAMUSCULAR; INTRAVENOUS EVERY 6 HOURS PRN
Status: DISCONTINUED | OUTPATIENT
Start: 2018-01-01 | End: 2018-01-01 | Stop reason: SDUPTHER

## 2018-01-01 RX ORDER — INSULIN GLARGINE 100 [IU]/ML
50 INJECTION, SOLUTION SUBCUTANEOUS 2 TIMES DAILY
Status: ON HOLD | COMMUNITY
End: 2018-01-01 | Stop reason: HOSPADM

## 2018-01-01 RX ORDER — MAGNESIUM SULFATE 1 G/100ML
1 INJECTION INTRAVENOUS ONCE
Status: COMPLETED | OUTPATIENT
Start: 2018-01-01 | End: 2018-01-01

## 2018-01-01 RX ORDER — PETROLATUM 42 G/100G
OINTMENT TOPICAL 2 TIMES DAILY PRN
Status: DISCONTINUED | OUTPATIENT
Start: 2018-01-01 | End: 2018-01-01 | Stop reason: HOSPADM

## 2018-01-01 RX ORDER — FLUCONAZOLE 100 MG/1
200 TABLET ORAL DAILY
Status: DISCONTINUED | OUTPATIENT
Start: 2018-01-01 | End: 2018-01-01 | Stop reason: HOSPADM

## 2018-01-01 RX ORDER — WARFARIN SODIUM 3 MG/1
3 TABLET ORAL ONCE
Status: COMPLETED | OUTPATIENT
Start: 2018-01-01 | End: 2018-01-01

## 2018-01-01 RX ORDER — HYDROCODONE BITARTRATE AND ACETAMINOPHEN 5; 325 MG/1; MG/1
1 TABLET ORAL ONCE
Status: COMPLETED | OUTPATIENT
Start: 2018-01-01 | End: 2018-01-01

## 2018-01-01 RX ORDER — MORPHINE SULFATE 2 MG/ML
1 INJECTION, SOLUTION INTRAMUSCULAR; INTRAVENOUS
Status: DISCONTINUED | OUTPATIENT
Start: 2018-01-01 | End: 2018-01-01 | Stop reason: HOSPADM

## 2018-01-01 RX ORDER — LORAZEPAM 2 MG/ML
2 INJECTION INTRAMUSCULAR ONCE
Status: COMPLETED | OUTPATIENT
Start: 2018-01-01 | End: 2018-01-01

## 2018-01-01 RX ORDER — HALOPERIDOL 5 MG/ML
2 INJECTION INTRAMUSCULAR EVERY 6 HOURS PRN
Status: DISCONTINUED | OUTPATIENT
Start: 2018-01-01 | End: 2018-01-01 | Stop reason: HOSPADM

## 2018-01-01 RX ORDER — INSULIN GLARGINE 100 [IU]/ML
25 INJECTION, SOLUTION SUBCUTANEOUS NIGHTLY
Status: DISCONTINUED | OUTPATIENT
Start: 2018-01-01 | End: 2018-01-01 | Stop reason: HOSPADM

## 2018-01-01 RX ORDER — BUMETANIDE 2 MG/1
2 TABLET ORAL DAILY
Qty: 30 TABLET | Refills: 0 | Status: ON HOLD | OUTPATIENT
Start: 2018-01-01 | End: 2018-01-01 | Stop reason: HOSPADM

## 2018-01-01 RX ORDER — BUMETANIDE 2 MG/1
2 TABLET ORAL DAILY
Status: ON HOLD | DISCHARGE
Start: 2018-01-01 | End: 2018-01-01 | Stop reason: HOSPADM

## 2018-01-01 RX ORDER — LORAZEPAM 0.5 MG/1
0.5 TABLET ORAL EVERY 8 HOURS PRN
Status: DISCONTINUED | OUTPATIENT
Start: 2018-01-01 | End: 2018-01-01 | Stop reason: HOSPADM

## 2018-01-01 RX ORDER — WARFARIN SODIUM 2.5 MG/1
2.5 TABLET ORAL DAILY
Status: DISCONTINUED | OUTPATIENT
Start: 2018-01-01 | End: 2018-01-01 | Stop reason: HOSPADM

## 2018-01-01 RX ORDER — MORPHINE SULFATE 10 MG/5ML
5 SOLUTION ORAL EVERY 4 HOURS PRN
Status: DISCONTINUED | OUTPATIENT
Start: 2018-01-01 | End: 2018-01-01 | Stop reason: HOSPADM

## 2018-01-01 RX ORDER — TRAMADOL HYDROCHLORIDE 50 MG/1
50 TABLET ORAL EVERY 6 HOURS PRN
Status: DISCONTINUED | OUTPATIENT
Start: 2018-01-01 | End: 2018-01-01 | Stop reason: HOSPADM

## 2018-01-01 RX ORDER — HYDRALAZINE HYDROCHLORIDE 10 MG/1
10 TABLET, FILM COATED ORAL EVERY 8 HOURS SCHEDULED
Status: DISCONTINUED | OUTPATIENT
Start: 2018-01-01 | End: 2018-01-01

## 2018-01-01 RX ORDER — MORPHINE SULFATE 2 MG/ML
2 INJECTION, SOLUTION INTRAMUSCULAR; INTRAVENOUS ONCE
Status: COMPLETED | OUTPATIENT
Start: 2018-01-01 | End: 2018-01-01

## 2018-01-01 RX ORDER — OXYCODONE HYDROCHLORIDE AND ACETAMINOPHEN 5; 325 MG/1; MG/1
1 TABLET ORAL
Status: DISCONTINUED | OUTPATIENT
Start: 2018-01-01 | End: 2018-01-01 | Stop reason: HOSPADM

## 2018-01-01 RX ORDER — INSULIN GLARGINE 100 [IU]/ML
10 INJECTION, SOLUTION SUBCUTANEOUS ONCE
Status: COMPLETED | OUTPATIENT
Start: 2018-01-01 | End: 2018-01-01

## 2018-01-01 RX ORDER — NITROGLYCERIN 0.4 MG/1
0.4 TABLET SUBLINGUAL ONCE
Status: COMPLETED | OUTPATIENT
Start: 2018-01-01 | End: 2018-01-01

## 2018-01-01 RX ORDER — PHYTONADIONE 5 MG/1
10 TABLET ORAL ONCE
Status: COMPLETED | OUTPATIENT
Start: 2018-01-01 | End: 2018-01-01

## 2018-01-01 RX ORDER — FENTANYL CITRATE 50 UG/ML
INJECTION, SOLUTION INTRAMUSCULAR; INTRAVENOUS PRN
Status: DISCONTINUED | OUTPATIENT
Start: 2018-01-01 | End: 2018-01-01 | Stop reason: SDUPTHER

## 2018-01-01 RX ORDER — 0.9 % SODIUM CHLORIDE 0.9 %
250 INTRAVENOUS SOLUTION INTRAVENOUS ONCE
Status: COMPLETED | OUTPATIENT
Start: 2018-01-01 | End: 2018-01-01

## 2018-01-01 RX ORDER — HALOPERIDOL 5 MG/ML
2 INJECTION INTRAMUSCULAR NIGHTLY PRN
Status: DISCONTINUED | OUTPATIENT
Start: 2018-01-01 | End: 2018-01-01 | Stop reason: HOSPADM

## 2018-01-01 RX ORDER — HYDRALAZINE HYDROCHLORIDE 25 MG/1
25 TABLET, FILM COATED ORAL EVERY 8 HOURS SCHEDULED
Status: DISCONTINUED | OUTPATIENT
Start: 2018-01-01 | End: 2018-01-01

## 2018-01-01 RX ORDER — METOPROLOL SUCCINATE 50 MG/1
50 TABLET, EXTENDED RELEASE ORAL 2 TIMES DAILY
Qty: 60 TABLET | Refills: 0 | Status: SHIPPED | OUTPATIENT
Start: 2018-01-01

## 2018-01-01 RX ORDER — WARFARIN SODIUM 3 MG/1
3 TABLET ORAL
Status: ON HOLD | COMMUNITY
End: 2018-01-01 | Stop reason: HOSPADM

## 2018-01-01 RX ORDER — LORAZEPAM 0.5 MG/1
0.5 TABLET ORAL EVERY 12 HOURS PRN
Status: DISCONTINUED | OUTPATIENT
Start: 2018-01-01 | End: 2018-01-01 | Stop reason: HOSPADM

## 2018-01-01 RX ORDER — WARFARIN SODIUM 3 MG/1
3 TABLET ORAL DAILY
Status: DISCONTINUED | OUTPATIENT
Start: 2018-01-01 | End: 2018-01-01 | Stop reason: HOSPADM

## 2018-01-01 RX ORDER — WARFARIN SODIUM 3 MG/1
3 TABLET ORAL DAILY
Status: DISCONTINUED | OUTPATIENT
Start: 2018-01-01 | End: 2018-01-01

## 2018-01-01 RX ORDER — POTASSIUM CHLORIDE 20 MEQ/1
40 TABLET, EXTENDED RELEASE ORAL DAILY
Status: DISCONTINUED | OUTPATIENT
Start: 2018-01-01 | End: 2018-01-01 | Stop reason: HOSPADM

## 2018-01-01 RX ORDER — HYDRALAZINE HYDROCHLORIDE 10 MG/1
10 TABLET, FILM COATED ORAL EVERY 8 HOURS SCHEDULED
Qty: 90 TABLET | Refills: 0 | Status: SHIPPED | OUTPATIENT
Start: 2018-01-01

## 2018-01-01 RX ORDER — TRAZODONE HYDROCHLORIDE 50 MG/1
25 TABLET ORAL NIGHTLY
Status: DISCONTINUED | OUTPATIENT
Start: 2018-01-01 | End: 2018-01-01 | Stop reason: HOSPADM

## 2018-01-01 RX ORDER — HYDROCODONE BITARTRATE AND ACETAMINOPHEN 5; 325 MG/1; MG/1
TABLET ORAL
Status: COMPLETED
Start: 2018-01-01 | End: 2018-01-01

## 2018-01-01 RX ORDER — BUMETANIDE 0.25 MG/ML
1 INJECTION, SOLUTION INTRAMUSCULAR; INTRAVENOUS 2 TIMES DAILY
Status: DISCONTINUED | OUTPATIENT
Start: 2018-01-01 | End: 2018-01-01

## 2018-01-01 RX ORDER — SODIUM CHLORIDE 9 MG/ML
INJECTION, SOLUTION INTRAVENOUS CONTINUOUS PRN
Status: DISCONTINUED | OUTPATIENT
Start: 2018-01-01 | End: 2018-01-01 | Stop reason: SDUPTHER

## 2018-01-01 RX ORDER — PETROLATUM 42 G/100G
OINTMENT TOPICAL DAILY
Status: DISCONTINUED | OUTPATIENT
Start: 2018-01-01 | End: 2018-01-01 | Stop reason: HOSPADM

## 2018-01-01 RX ORDER — FUROSEMIDE 10 MG/ML
40 INJECTION INTRAMUSCULAR; INTRAVENOUS 2 TIMES DAILY
Status: DISCONTINUED | OUTPATIENT
Start: 2018-01-01 | End: 2018-01-01

## 2018-01-01 RX ORDER — 0.9 % SODIUM CHLORIDE 0.9 %
750 INTRAVENOUS SOLUTION INTRAVENOUS ONCE
Status: DISCONTINUED | OUTPATIENT
Start: 2018-01-01 | End: 2018-01-01

## 2018-01-01 RX ORDER — MIDAZOLAM HYDROCHLORIDE 1 MG/ML
INJECTION INTRAMUSCULAR; INTRAVENOUS PRN
Status: DISCONTINUED | OUTPATIENT
Start: 2018-01-01 | End: 2018-01-01

## 2018-01-01 RX ORDER — WARFARIN SODIUM 2.5 MG/1
2.5 TABLET ORAL DAILY
Status: DISCONTINUED | OUTPATIENT
Start: 2018-01-01 | End: 2018-01-01

## 2018-01-01 RX ORDER — PETROLATUM 42 G/100G
OINTMENT TOPICAL 3 TIMES DAILY
Status: DISCONTINUED | OUTPATIENT
Start: 2018-01-01 | End: 2018-01-01 | Stop reason: HOSPADM

## 2018-01-01 RX ORDER — LANOLIN ALCOHOL/MO/W.PET/CERES
325 CREAM (GRAM) TOPICAL
COMMUNITY

## 2018-01-01 RX ORDER — MIRTAZAPINE 30 MG/1
30 TABLET, FILM COATED ORAL NIGHTLY
COMMUNITY

## 2018-01-01 RX ORDER — FUROSEMIDE 10 MG/ML
40 INJECTION INTRAMUSCULAR; INTRAVENOUS DAILY
Status: DISCONTINUED | OUTPATIENT
Start: 2018-01-01 | End: 2018-01-01 | Stop reason: HOSPADM

## 2018-01-01 RX ORDER — HYDRALAZINE HYDROCHLORIDE 10 MG/1
10 TABLET, FILM COATED ORAL EVERY 8 HOURS SCHEDULED
Status: DISCONTINUED | OUTPATIENT
Start: 2018-01-01 | End: 2018-01-01 | Stop reason: HOSPADM

## 2018-01-01 RX ORDER — ISOSORBIDE MONONITRATE 60 MG/1
120 TABLET, EXTENDED RELEASE ORAL DAILY
Status: DISCONTINUED | OUTPATIENT
Start: 2018-01-01 | End: 2018-01-01

## 2018-01-01 RX ORDER — METOLAZONE 2.5 MG/1
2.5 TABLET ORAL DAILY PRN
Status: ON HOLD | COMMUNITY
End: 2018-01-01 | Stop reason: HOSPADM

## 2018-01-01 RX ORDER — INSULIN GLARGINE 100 [IU]/ML
30 INJECTION, SOLUTION SUBCUTANEOUS 2 TIMES DAILY
Status: DISCONTINUED | OUTPATIENT
Start: 2018-01-01 | End: 2018-01-01 | Stop reason: HOSPADM

## 2018-01-01 RX ORDER — HYDROCODONE BITARTRATE AND ACETAMINOPHEN 5; 325 MG/1; MG/1
1 TABLET ORAL EVERY 6 HOURS PRN
Status: DISCONTINUED | OUTPATIENT
Start: 2018-01-01 | End: 2018-01-01 | Stop reason: HOSPADM

## 2018-01-01 RX ORDER — OSELTAMIVIR PHOSPHATE 30 MG/1
30 CAPSULE ORAL 2 TIMES DAILY
Status: DISCONTINUED | OUTPATIENT
Start: 2018-01-01 | End: 2018-01-01 | Stop reason: HOSPADM

## 2018-01-01 RX ADMIN — HYDRALAZINE HYDROCHLORIDE 25 MG: 25 TABLET ORAL at 09:27

## 2018-01-01 RX ADMIN — MORPHINE SULFATE 2 MG: 2 INJECTION, SOLUTION INTRAMUSCULAR; INTRAVENOUS at 14:18

## 2018-01-01 RX ADMIN — PETROLATUM: 42 OINTMENT TOPICAL at 21:10

## 2018-01-01 RX ADMIN — SODIUM CHLORIDE 1000 ML: 9 INJECTION, SOLUTION INTRAVENOUS at 13:47

## 2018-01-01 RX ADMIN — HYDRALAZINE HYDROCHLORIDE 25 MG: 25 TABLET, FILM COATED ORAL at 15:30

## 2018-01-01 RX ADMIN — ATROPA BELLADONNA AND OPIUM 60 MG: 16.2; 3 SUPPOSITORY RECTAL at 10:38

## 2018-01-01 RX ADMIN — Medication 10 ML: at 08:34

## 2018-01-01 RX ADMIN — MEXILETINE HYDROCHLORIDE 250 MG: 250 CAPSULE ORAL at 14:13

## 2018-01-01 RX ADMIN — LORAZEPAM 0.5 MG: 0.5 TABLET ORAL at 17:54

## 2018-01-01 RX ADMIN — HYDRALAZINE HYDROCHLORIDE 25 MG: 25 TABLET, FILM COATED ORAL at 20:28

## 2018-01-01 RX ADMIN — HYDRALAZINE HYDROCHLORIDE 25 MG: 25 TABLET ORAL at 16:23

## 2018-01-01 RX ADMIN — SODIUM CHLORIDE: 9 INJECTION, SOLUTION INTRAVENOUS at 17:34

## 2018-01-01 RX ADMIN — INSULIN GLARGINE 25 UNITS: 100 INJECTION, SOLUTION SUBCUTANEOUS at 08:48

## 2018-01-01 RX ADMIN — MEXILETINE HYDROCHLORIDE 250 MG: 250 CAPSULE ORAL at 08:36

## 2018-01-01 RX ADMIN — HYDRALAZINE HYDROCHLORIDE 25 MG: 25 TABLET, FILM COATED ORAL at 13:49

## 2018-01-01 RX ADMIN — SODIUM CHLORIDE 1000 ML: 9 INJECTION, SOLUTION INTRAVENOUS at 18:07

## 2018-01-01 RX ADMIN — FERROUS SULFATE TAB 325 MG (65 MG ELEMENTAL FE) 325 MG: 325 (65 FE) TAB at 13:42

## 2018-01-01 RX ADMIN — INSULIN LISPRO 1 UNITS: 100 INJECTION, SOLUTION INTRAVENOUS; SUBCUTANEOUS at 12:12

## 2018-01-01 RX ADMIN — MEXILETINE HYDROCHLORIDE 250 MG: 250 CAPSULE ORAL at 18:32

## 2018-01-01 RX ADMIN — INSULIN LISPRO 6 UNITS: 100 INJECTION, SOLUTION INTRAVENOUS; SUBCUTANEOUS at 16:47

## 2018-01-01 RX ADMIN — MEXILETINE HYDROCHLORIDE 250 MG: 250 CAPSULE ORAL at 15:23

## 2018-01-01 RX ADMIN — Medication 10 ML: at 08:30

## 2018-01-01 RX ADMIN — METOPROLOL SUCCINATE 200 MG: 100 TABLET, FILM COATED, EXTENDED RELEASE ORAL at 21:09

## 2018-01-01 RX ADMIN — MORPHINE SULFATE 1 MG: 2 INJECTION, SOLUTION INTRAMUSCULAR; INTRAVENOUS at 15:38

## 2018-01-01 RX ADMIN — ISOSORBIDE MONONITRATE 60 MG: 60 TABLET, EXTENDED RELEASE ORAL at 08:12

## 2018-01-01 RX ADMIN — ALLOPURINOL 300 MG: 300 TABLET ORAL at 08:25

## 2018-01-01 RX ADMIN — MEXILETINE HYDROCHLORIDE 200 MG: 200 CAPSULE ORAL at 09:43

## 2018-01-01 RX ADMIN — MEXILETINE HYDROCHLORIDE 250 MG: 250 CAPSULE ORAL at 09:03

## 2018-01-01 RX ADMIN — MEXILETINE HYDROCHLORIDE 200 MG: 200 CAPSULE ORAL at 21:26

## 2018-01-01 RX ADMIN — PETROLATUM: 42 OINTMENT TOPICAL at 08:56

## 2018-01-01 RX ADMIN — MUPIROCIN: 20 OINTMENT TOPICAL at 21:11

## 2018-01-01 RX ADMIN — POTASSIUM CHLORIDE 20 MEQ: 20 TABLET, EXTENDED RELEASE ORAL at 20:21

## 2018-01-01 RX ADMIN — NITROGLYCERIN 0.4 MG: 0.4 TABLET SUBLINGUAL at 15:21

## 2018-01-01 RX ADMIN — CHOLECALCIFEROL TAB 25 MCG (1000 UNIT) 1000 UNITS: 25 TAB at 13:42

## 2018-01-01 RX ADMIN — CEFTRIAXONE SODIUM 1 G: 1 INJECTION, POWDER, FOR SOLUTION INTRAMUSCULAR; INTRAVENOUS at 19:24

## 2018-01-01 RX ADMIN — INSULIN LISPRO 3 UNITS: 100 INJECTION, SOLUTION INTRAVENOUS; SUBCUTANEOUS at 17:49

## 2018-01-01 RX ADMIN — Medication 10 ML: at 08:23

## 2018-01-01 RX ADMIN — INSULIN LISPRO 4 UNITS: 100 INJECTION, SOLUTION INTRAVENOUS; SUBCUTANEOUS at 17:58

## 2018-01-01 RX ADMIN — METOPROLOL SUCCINATE 200 MG: 100 TABLET, EXTENDED RELEASE ORAL at 08:33

## 2018-01-01 RX ADMIN — PIPERACILLIN SODIUM AND TAZOBACTAM SODIUM 3.38 G: 3; .375 INJECTION, POWDER, LYOPHILIZED, FOR SOLUTION INTRAVENOUS at 15:58

## 2018-01-01 RX ADMIN — IPRATROPIUM BROMIDE AND ALBUTEROL SULFATE 1 AMPULE: .5; 3 SOLUTION RESPIRATORY (INHALATION) at 21:06

## 2018-01-01 RX ADMIN — MEXILETINE HYDROCHLORIDE 250 MG: 250 CAPSULE ORAL at 16:23

## 2018-01-01 RX ADMIN — PETROLATUM: 42 OINTMENT TOPICAL at 21:13

## 2018-01-01 RX ADMIN — SENNOSIDES 8.6 MG: 8.6 TABLET, FILM COATED ORAL at 18:06

## 2018-01-01 RX ADMIN — PIPERACILLIN SODIUM AND TAZOBACTAM SODIUM 3.38 G: 3; .375 INJECTION, POWDER, LYOPHILIZED, FOR SOLUTION INTRAVENOUS at 07:55

## 2018-01-01 RX ADMIN — MEXILETINE HYDROCHLORIDE 250 MG: 250 CAPSULE ORAL at 08:43

## 2018-01-01 RX ADMIN — HYDRALAZINE HYDROCHLORIDE 25 MG: 25 TABLET, FILM COATED ORAL at 05:59

## 2018-01-01 RX ADMIN — MEXILETINE HYDROCHLORIDE 250 MG: 250 CAPSULE ORAL at 21:41

## 2018-01-01 RX ADMIN — METOPROLOL SUCCINATE 50 MG: 50 TABLET, EXTENDED RELEASE ORAL at 08:30

## 2018-01-01 RX ADMIN — HYDRALAZINE HYDROCHLORIDE 25 MG: 25 TABLET, FILM COATED ORAL at 13:30

## 2018-01-01 RX ADMIN — HYDRALAZINE HYDROCHLORIDE 25 MG: 25 TABLET, FILM COATED ORAL at 16:12

## 2018-01-01 RX ADMIN — ENOXAPARIN SODIUM 40 MG: 40 INJECTION SUBCUTANEOUS at 08:14

## 2018-01-01 RX ADMIN — METOPROLOL SUCCINATE 200 MG: 100 TABLET, EXTENDED RELEASE ORAL at 21:31

## 2018-01-01 RX ADMIN — PETROLATUM: 42 OINTMENT TOPICAL at 21:35

## 2018-01-01 RX ADMIN — PETROLATUM: 42 OINTMENT TOPICAL at 10:21

## 2018-01-01 RX ADMIN — VITAMIN D, TAB 1000IU (100/BT) 1000 UNITS: 25 TAB at 21:40

## 2018-01-01 RX ADMIN — CEFTRIAXONE SODIUM 1 G: 1 INJECTION, POWDER, FOR SOLUTION INTRAMUSCULAR; INTRAVENOUS at 18:12

## 2018-01-01 RX ADMIN — ISOSORBIDE MONONITRATE 120 MG: 60 TABLET ORAL at 08:58

## 2018-01-01 RX ADMIN — HYDRALAZINE HYDROCHLORIDE 25 MG: 25 TABLET, FILM COATED ORAL at 16:26

## 2018-01-01 RX ADMIN — ISOSORBIDE MONONITRATE 60 MG: 60 TABLET, EXTENDED RELEASE ORAL at 08:49

## 2018-01-01 RX ADMIN — INSULIN LISPRO 3 UNITS: 100 INJECTION, SOLUTION INTRAVENOUS; SUBCUTANEOUS at 16:24

## 2018-01-01 RX ADMIN — MEXILETINE HYDROCHLORIDE 250 MG: 250 CAPSULE ORAL at 09:07

## 2018-01-01 RX ADMIN — Medication 10 ML: at 21:30

## 2018-01-01 RX ADMIN — INSULIN LISPRO 2 UNITS: 100 INJECTION, SOLUTION INTRAVENOUS; SUBCUTANEOUS at 17:19

## 2018-01-01 RX ADMIN — INSULIN LISPRO 2 UNITS: 100 INJECTION, SOLUTION INTRAVENOUS; SUBCUTANEOUS at 16:39

## 2018-01-01 RX ADMIN — ISOSORBIDE MONONITRATE 60 MG: 60 TABLET ORAL at 23:04

## 2018-01-01 RX ADMIN — MEXILETINE HYDROCHLORIDE 250 MG: 250 CAPSULE ORAL at 06:27

## 2018-01-01 RX ADMIN — WARFARIN SODIUM 2.5 MG: 2.5 TABLET ORAL at 17:12

## 2018-01-01 RX ADMIN — VITAMIN D, TAB 1000IU (100/BT) 1000 UNITS: 25 TAB at 12:33

## 2018-01-01 RX ADMIN — HYDRALAZINE HYDROCHLORIDE 25 MG: 25 TABLET, FILM COATED ORAL at 05:51

## 2018-01-01 RX ADMIN — HYDRALAZINE HYDROCHLORIDE 25 MG: 25 TABLET, FILM COATED ORAL at 21:08

## 2018-01-01 RX ADMIN — TRAMADOL HYDROCHLORIDE 50 MG: 50 TABLET, FILM COATED ORAL at 20:16

## 2018-01-01 RX ADMIN — METOPROLOL SUCCINATE 200 MG: 100 TABLET, EXTENDED RELEASE ORAL at 10:06

## 2018-01-01 RX ADMIN — SODIUM CHLORIDE: 9 INJECTION, SOLUTION INTRAVENOUS at 03:03

## 2018-01-01 RX ADMIN — PIPERACILLIN SODIUM AND TAZOBACTAM SODIUM 3.38 G: 3; .375 INJECTION, POWDER, LYOPHILIZED, FOR SOLUTION INTRAVENOUS at 08:32

## 2018-01-01 RX ADMIN — PETROLATUM: 42 OINTMENT TOPICAL at 08:45

## 2018-01-01 RX ADMIN — PIPERACILLIN SODIUM AND TAZOBACTAM SODIUM 3.38 G: 3; .375 INJECTION, POWDER, LYOPHILIZED, FOR SOLUTION INTRAVENOUS at 07:32

## 2018-01-01 RX ADMIN — METOPROLOL SUCCINATE 50 MG: 50 TABLET, EXTENDED RELEASE ORAL at 20:29

## 2018-01-01 RX ADMIN — METOPROLOL SUCCINATE 50 MG: 50 TABLET, EXTENDED RELEASE ORAL at 21:10

## 2018-01-01 RX ADMIN — HYDROCODONE BITARTRATE AND ACETAMINOPHEN 1 TABLET: 5; 325 TABLET ORAL at 05:37

## 2018-01-01 RX ADMIN — IPRATROPIUM BROMIDE AND ALBUTEROL SULFATE 1 AMPULE: .5; 3 SOLUTION RESPIRATORY (INHALATION) at 09:24

## 2018-01-01 RX ADMIN — INSULIN LISPRO 2 UNITS: 100 INJECTION, SOLUTION INTRAVENOUS; SUBCUTANEOUS at 12:05

## 2018-01-01 RX ADMIN — PROPOFOL 50 MG: 10 INJECTION, EMULSION INTRAVENOUS at 10:05

## 2018-01-01 RX ADMIN — POTASSIUM CHLORIDE 20 MEQ: 20 TABLET, EXTENDED RELEASE ORAL at 21:35

## 2018-01-01 RX ADMIN — MEXILETINE HYDROCHLORIDE 250 MG: 250 CAPSULE ORAL at 22:04

## 2018-01-01 RX ADMIN — SODIUM CHLORIDE 1000 ML: 9 INJECTION, SOLUTION INTRAVENOUS at 03:18

## 2018-01-01 RX ADMIN — MEXILETINE HYDROCHLORIDE 200 MG: 200 CAPSULE ORAL at 06:47

## 2018-01-01 RX ADMIN — MEXILETINE HYDROCHLORIDE 250 MG: 250 CAPSULE ORAL at 14:59

## 2018-01-01 RX ADMIN — METOPROLOL SUCCINATE 200 MG: 100 TABLET, FILM COATED, EXTENDED RELEASE ORAL at 21:42

## 2018-01-01 RX ADMIN — HYDRALAZINE HYDROCHLORIDE 25 MG: 25 TABLET, FILM COATED ORAL at 21:41

## 2018-01-01 RX ADMIN — OSELTAMIVIR PHOSPHATE 30 MG: 30 CAPSULE ORAL at 21:09

## 2018-01-01 RX ADMIN — ASPIRIN 81 MG 81 MG: 81 TABLET ORAL at 16:24

## 2018-01-01 RX ADMIN — OSELTAMIVIR PHOSPHATE 30 MG: 30 CAPSULE ORAL at 09:27

## 2018-01-01 RX ADMIN — POTASSIUM CHLORIDE 40 MEQ: 20 TABLET, EXTENDED RELEASE ORAL at 08:49

## 2018-01-01 RX ADMIN — BUMETANIDE 2 MG: 1 TABLET ORAL at 09:00

## 2018-01-01 RX ADMIN — HYDRALAZINE HYDROCHLORIDE 25 MG: 25 TABLET, FILM COATED ORAL at 13:39

## 2018-01-01 RX ADMIN — METOPROLOL SUCCINATE 50 MG: 50 TABLET, EXTENDED RELEASE ORAL at 09:40

## 2018-01-01 RX ADMIN — METOPROLOL SUCCINATE 50 MG: 50 TABLET, EXTENDED RELEASE ORAL at 09:14

## 2018-01-01 RX ADMIN — INSULIN GLARGINE 50 UNITS: 100 INJECTION, SOLUTION SUBCUTANEOUS at 22:17

## 2018-01-01 RX ADMIN — ISOSORBIDE MONONITRATE 60 MG: 60 TABLET, EXTENDED RELEASE ORAL at 09:10

## 2018-01-01 RX ADMIN — MEXILETINE HYDROCHLORIDE 250 MG: 250 CAPSULE ORAL at 21:10

## 2018-01-01 RX ADMIN — METOPROLOL SUCCINATE 50 MG: 50 TABLET, EXTENDED RELEASE ORAL at 08:25

## 2018-01-01 RX ADMIN — ASPIRIN 81 MG 81 MG: 81 TABLET ORAL at 21:40

## 2018-01-01 RX ADMIN — INSULIN GLARGINE 20 UNITS: 100 INJECTION, SOLUTION SUBCUTANEOUS at 21:50

## 2018-01-01 RX ADMIN — METOPROLOL SUCCINATE 200 MG: 100 TABLET, EXTENDED RELEASE ORAL at 20:48

## 2018-01-01 RX ADMIN — FERROUS SULFATE TAB 325 MG (65 MG ELEMENTAL FE) 325 MG: 325 (65 FE) TAB at 09:10

## 2018-01-01 RX ADMIN — MEXILETINE HYDROCHLORIDE 250 MG: 250 CAPSULE ORAL at 21:42

## 2018-01-01 RX ADMIN — MEXILETINE HYDROCHLORIDE 250 MG: 250 CAPSULE ORAL at 21:22

## 2018-01-01 RX ADMIN — FERROUS SULFATE TAB 325 MG (65 MG ELEMENTAL FE) 325 MG: 325 (65 FE) TAB at 08:49

## 2018-01-01 RX ADMIN — HYDRALAZINE HYDROCHLORIDE 25 MG: 25 TABLET, FILM COATED ORAL at 22:21

## 2018-01-01 RX ADMIN — HYDRALAZINE HYDROCHLORIDE 25 MG: 25 TABLET, FILM COATED ORAL at 13:58

## 2018-01-01 RX ADMIN — HYDRALAZINE HYDROCHLORIDE 25 MG: 25 TABLET, FILM COATED ORAL at 15:31

## 2018-01-01 RX ADMIN — Medication 10 ML: at 22:07

## 2018-01-01 RX ADMIN — CEFTRIAXONE SODIUM 1 G: 1 INJECTION, POWDER, FOR SOLUTION INTRAMUSCULAR; INTRAVENOUS at 17:11

## 2018-01-01 RX ADMIN — LORAZEPAM 2 MG: 2 INJECTION, SOLUTION INTRAMUSCULAR; INTRAVENOUS at 12:12

## 2018-01-01 RX ADMIN — VITAMIN D, TAB 1000IU (100/BT) 1000 UNITS: 25 TAB at 12:11

## 2018-01-01 RX ADMIN — POTASSIUM CHLORIDE 20 MEQ: 20 TABLET, EXTENDED RELEASE ORAL at 22:07

## 2018-01-01 RX ADMIN — FUROSEMIDE 40 MG: 10 INJECTION, SOLUTION INTRAMUSCULAR; INTRAVENOUS at 18:03

## 2018-01-01 RX ADMIN — INSULIN LISPRO 2 UNITS: 100 INJECTION, SOLUTION INTRAVENOUS; SUBCUTANEOUS at 08:41

## 2018-01-01 RX ADMIN — INSULIN LISPRO 3 UNITS: 100 INJECTION, SOLUTION INTRAVENOUS; SUBCUTANEOUS at 17:02

## 2018-01-01 RX ADMIN — INSULIN LISPRO 1 UNITS: 100 INJECTION, SOLUTION INTRAVENOUS; SUBCUTANEOUS at 18:50

## 2018-01-01 RX ADMIN — ISOSORBIDE MONONITRATE 60 MG: 60 TABLET, EXTENDED RELEASE ORAL at 08:30

## 2018-01-01 RX ADMIN — FENTANYL CITRATE 50 MCG: 50 INJECTION, SOLUTION INTRAMUSCULAR; INTRAVENOUS at 09:02

## 2018-01-01 RX ADMIN — INSULIN LISPRO 4 UNITS: 100 INJECTION, SOLUTION INTRAVENOUS; SUBCUTANEOUS at 16:46

## 2018-01-01 RX ADMIN — ALLOPURINOL 300 MG: 300 TABLET ORAL at 11:44

## 2018-01-01 RX ADMIN — METOPROLOL SUCCINATE 50 MG: 50 TABLET, EXTENDED RELEASE ORAL at 08:44

## 2018-01-01 RX ADMIN — CEFTRIAXONE 1 G: 1 INJECTION, POWDER, FOR SOLUTION INTRAMUSCULAR; INTRAVENOUS at 19:01

## 2018-01-01 RX ADMIN — Medication 10 ML: at 10:21

## 2018-01-01 RX ADMIN — BUMETANIDE 2 MG: 0.25 INJECTION INTRAMUSCULAR; INTRAVENOUS at 09:40

## 2018-01-01 RX ADMIN — HYDRALAZINE HYDROCHLORIDE 25 MG: 25 TABLET, FILM COATED ORAL at 23:34

## 2018-01-01 RX ADMIN — DEXTROSE MONOHYDRATE 12.5 G: 25 INJECTION, SOLUTION INTRAVENOUS at 00:13

## 2018-01-01 RX ADMIN — INSULIN LISPRO 8 UNITS: 100 INJECTION, SOLUTION INTRAVENOUS; SUBCUTANEOUS at 12:53

## 2018-01-01 RX ADMIN — MORPHINE SULFATE 2 MG: 2 INJECTION, SOLUTION INTRAMUSCULAR; INTRAVENOUS at 02:29

## 2018-01-01 RX ADMIN — FLUCONAZOLE 200 MG: 100 TABLET ORAL at 18:50

## 2018-01-01 RX ADMIN — HALOPERIDOL LACTATE 2 MG: 5 INJECTION, SOLUTION INTRAMUSCULAR at 22:15

## 2018-01-01 RX ADMIN — MEXILETINE HYDROCHLORIDE 250 MG: 250 CAPSULE ORAL at 08:51

## 2018-01-01 RX ADMIN — PETROLATUM: 42 OINTMENT TOPICAL at 22:22

## 2018-01-01 RX ADMIN — MEXILETINE HYDROCHLORIDE 200 MG: 200 CAPSULE ORAL at 05:34

## 2018-01-01 RX ADMIN — FLUCONAZOLE 200 MG: 100 TABLET ORAL at 18:03

## 2018-01-01 RX ADMIN — HYDRALAZINE HYDROCHLORIDE 25 MG: 25 TABLET, FILM COATED ORAL at 06:11

## 2018-01-01 RX ADMIN — INSULIN GLARGINE 20 UNITS: 100 INJECTION, SOLUTION SUBCUTANEOUS at 20:20

## 2018-01-01 RX ADMIN — VITAMIN D, TAB 1000IU (100/BT) 1000 UNITS: 25 TAB at 11:44

## 2018-01-01 RX ADMIN — Medication 10 ML: at 08:59

## 2018-01-01 RX ADMIN — INSULIN GLARGINE 50 UNITS: 100 INJECTION, SOLUTION SUBCUTANEOUS at 20:52

## 2018-01-01 RX ADMIN — INSULIN LISPRO 2 UNITS: 100 INJECTION, SOLUTION INTRAVENOUS; SUBCUTANEOUS at 21:26

## 2018-01-01 RX ADMIN — INSULIN GLARGINE 40 UNITS: 100 INJECTION, SOLUTION SUBCUTANEOUS at 21:50

## 2018-01-01 RX ADMIN — TRAZODONE HYDROCHLORIDE 25 MG: 50 TABLET ORAL at 20:37

## 2018-01-01 RX ADMIN — TRAMADOL HYDROCHLORIDE 50 MG: 50 TABLET, FILM COATED ORAL at 06:09

## 2018-01-01 RX ADMIN — HYDRALAZINE HYDROCHLORIDE 25 MG: 25 TABLET, FILM COATED ORAL at 14:29

## 2018-01-01 RX ADMIN — FERROUS SULFATE TAB 325 MG (65 MG ELEMENTAL FE) 325 MG: 325 (65 FE) TAB at 09:31

## 2018-01-01 RX ADMIN — WARFARIN SODIUM 2.5 MG: 2.5 TABLET ORAL at 17:30

## 2018-01-01 RX ADMIN — METOPROLOL SUCCINATE 200 MG: 100 TABLET, EXTENDED RELEASE ORAL at 09:10

## 2018-01-01 RX ADMIN — MIRTAZAPINE 30 MG: 15 TABLET, FILM COATED ORAL at 21:59

## 2018-01-01 RX ADMIN — ISOSORBIDE MONONITRATE 60 MG: 60 TABLET ORAL at 09:02

## 2018-01-01 RX ADMIN — INSULIN LISPRO 3 UNITS: 100 INJECTION, SOLUTION INTRAVENOUS; SUBCUTANEOUS at 12:32

## 2018-01-01 RX ADMIN — ALLOPURINOL 300 MG: 300 TABLET ORAL at 09:26

## 2018-01-01 RX ADMIN — HYDRALAZINE HYDROCHLORIDE 25 MG: 25 TABLET, FILM COATED ORAL at 20:37

## 2018-01-01 RX ADMIN — MEXILETINE HYDROCHLORIDE 250 MG: 250 CAPSULE ORAL at 08:19

## 2018-01-01 RX ADMIN — INSULIN LISPRO 2 UNITS: 100 INJECTION, SOLUTION INTRAVENOUS; SUBCUTANEOUS at 11:37

## 2018-01-01 RX ADMIN — MEXILETINE HYDROCHLORIDE 250 MG: 250 CAPSULE ORAL at 21:43

## 2018-01-01 RX ADMIN — INSULIN LISPRO 1 UNITS: 100 INJECTION, SOLUTION INTRAVENOUS; SUBCUTANEOUS at 11:50

## 2018-01-01 RX ADMIN — INSULIN LISPRO 3 UNITS: 100 INJECTION, SOLUTION INTRAVENOUS; SUBCUTANEOUS at 21:09

## 2018-01-01 RX ADMIN — MEXILETINE HYDROCHLORIDE 200 MG: 200 CAPSULE ORAL at 15:30

## 2018-01-01 RX ADMIN — SODIUM CHLORIDE 500 ML: 9 INJECTION, SOLUTION INTRAVENOUS at 20:53

## 2018-01-01 RX ADMIN — MEXILETINE HYDROCHLORIDE 250 MG: 250 CAPSULE ORAL at 22:26

## 2018-01-01 RX ADMIN — IPRATROPIUM BROMIDE AND ALBUTEROL SULFATE 1 AMPULE: .5; 3 SOLUTION RESPIRATORY (INHALATION) at 15:49

## 2018-01-01 RX ADMIN — Medication 10 ML: at 09:21

## 2018-01-01 RX ADMIN — LORAZEPAM 0.5 MG: 0.5 TABLET ORAL at 09:10

## 2018-01-01 RX ADMIN — PETROLATUM: 42 OINTMENT TOPICAL at 08:34

## 2018-01-01 RX ADMIN — WARFARIN SODIUM 2.5 MG: 2.5 TABLET ORAL at 19:24

## 2018-01-01 RX ADMIN — MEXILETINE HYDROCHLORIDE 250 MG: 250 CAPSULE ORAL at 14:22

## 2018-01-01 RX ADMIN — IPRATROPIUM BROMIDE AND ALBUTEROL SULFATE 1 AMPULE: .5; 3 SOLUTION RESPIRATORY (INHALATION) at 16:15

## 2018-01-01 RX ADMIN — TRAZODONE HYDROCHLORIDE 25 MG: 50 TABLET ORAL at 21:20

## 2018-01-01 RX ADMIN — BUMETANIDE 2 MG: 1 TABLET ORAL at 22:11

## 2018-01-01 RX ADMIN — WARFARIN SODIUM 3 MG: 3 TABLET ORAL at 18:31

## 2018-01-01 RX ADMIN — Medication 10 ML: at 11:41

## 2018-01-01 RX ADMIN — FERROUS SULFATE TAB 325 MG (65 MG ELEMENTAL FE) 325 MG: 325 (65 FE) TAB at 12:39

## 2018-01-01 RX ADMIN — FERROUS SULFATE TAB 325 MG (65 MG ELEMENTAL FE) 325 MG: 325 (65 FE) TAB at 12:24

## 2018-01-01 RX ADMIN — METOPROLOL SUCCINATE 200 MG: 100 TABLET, EXTENDED RELEASE ORAL at 08:57

## 2018-01-01 RX ADMIN — FUROSEMIDE 40 MG: 10 INJECTION, SOLUTION INTRAMUSCULAR; INTRAVENOUS at 09:06

## 2018-01-01 RX ADMIN — HYDRALAZINE HYDROCHLORIDE 25 MG: 25 TABLET, FILM COATED ORAL at 05:25

## 2018-01-01 RX ADMIN — Medication 10 ML: at 09:32

## 2018-01-01 RX ADMIN — METOPROLOL SUCCINATE 200 MG: 100 TABLET, EXTENDED RELEASE ORAL at 10:19

## 2018-01-01 RX ADMIN — CEFTRIAXONE 1 G: 1 INJECTION, POWDER, FOR SOLUTION INTRAMUSCULAR; INTRAVENOUS at 17:29

## 2018-01-01 RX ADMIN — Medication 10 ML: at 23:48

## 2018-01-01 RX ADMIN — INSULIN LISPRO 3 UNITS: 100 INJECTION, SOLUTION INTRAVENOUS; SUBCUTANEOUS at 17:04

## 2018-01-01 RX ADMIN — FUROSEMIDE 40 MG: 10 INJECTION, SOLUTION INTRAMUSCULAR; INTRAVENOUS at 09:14

## 2018-01-01 RX ADMIN — ISOSORBIDE MONONITRATE 60 MG: 60 TABLET, EXTENDED RELEASE ORAL at 08:56

## 2018-01-01 RX ADMIN — FLUCONAZOLE 200 MG: 2 INJECTION, SOLUTION INTRAVENOUS at 11:42

## 2018-01-01 RX ADMIN — INSULIN GLARGINE 20 UNITS: 100 INJECTION, SOLUTION SUBCUTANEOUS at 20:09

## 2018-01-01 RX ADMIN — INSULIN HUMAN 10 UNITS: 100 INJECTION, SOLUTION PARENTERAL at 03:30

## 2018-01-01 RX ADMIN — MEXILETINE HYDROCHLORIDE 250 MG: 250 CAPSULE ORAL at 06:52

## 2018-01-01 RX ADMIN — METOPROLOL SUCCINATE 200 MG: 100 TABLET, EXTENDED RELEASE ORAL at 08:50

## 2018-01-01 RX ADMIN — METOPROLOL SUCCINATE 200 MG: 100 TABLET, FILM COATED, EXTENDED RELEASE ORAL at 22:11

## 2018-01-01 RX ADMIN — PETROLATUM: 42 OINTMENT TOPICAL at 09:41

## 2018-01-01 RX ADMIN — MEXILETINE HYDROCHLORIDE 250 MG: 250 CAPSULE ORAL at 06:35

## 2018-01-01 RX ADMIN — INSULIN LISPRO 4 UNITS: 100 INJECTION, SOLUTION INTRAVENOUS; SUBCUTANEOUS at 12:23

## 2018-01-01 RX ADMIN — BUMETANIDE 2 MG: 0.25 INJECTION INTRAMUSCULAR; INTRAVENOUS at 08:44

## 2018-01-01 RX ADMIN — INSULIN LISPRO 3 UNITS: 100 INJECTION, SOLUTION INTRAVENOUS; SUBCUTANEOUS at 16:56

## 2018-01-01 RX ADMIN — ATROPA BELLADONNA AND OPIUM 60 MG: 16.2; 3 SUPPOSITORY RECTAL at 12:25

## 2018-01-01 RX ADMIN — WARFARIN SODIUM 2.5 MG: 2.5 TABLET ORAL at 18:04

## 2018-01-01 RX ADMIN — ISOSORBIDE MONONITRATE 60 MG: 60 TABLET ORAL at 08:29

## 2018-01-01 RX ADMIN — BUMETANIDE 2 MG: 1 TABLET ORAL at 08:50

## 2018-01-01 RX ADMIN — Medication 10 ML: at 14:49

## 2018-01-01 RX ADMIN — HALOPERIDOL LACTATE 2 MG: 5 INJECTION, SOLUTION INTRAMUSCULAR at 22:44

## 2018-01-01 RX ADMIN — ISOSORBIDE MONONITRATE 60 MG: 60 TABLET, EXTENDED RELEASE ORAL at 09:14

## 2018-01-01 RX ADMIN — IPRATROPIUM BROMIDE AND ALBUTEROL SULFATE 1 AMPULE: .5; 3 SOLUTION RESPIRATORY (INHALATION) at 08:14

## 2018-01-01 RX ADMIN — Medication 10 ML: at 20:14

## 2018-01-01 RX ADMIN — INSULIN LISPRO 4 UNITS: 100 INJECTION, SOLUTION INTRAVENOUS; SUBCUTANEOUS at 12:24

## 2018-01-01 RX ADMIN — ONDANSETRON 4 MG: 2 INJECTION INTRAMUSCULAR; INTRAVENOUS at 23:43

## 2018-01-01 RX ADMIN — SODIUM CHLORIDE: 9 INJECTION, SOLUTION INTRAVENOUS at 16:02

## 2018-01-01 RX ADMIN — INSULIN LISPRO 1 UNITS: 100 INJECTION, SOLUTION INTRAVENOUS; SUBCUTANEOUS at 16:43

## 2018-01-01 RX ADMIN — POTASSIUM CHLORIDE 20 MEQ: 20 TABLET, EXTENDED RELEASE ORAL at 10:40

## 2018-01-01 RX ADMIN — MAGNESIUM HYDROXIDE 30 ML: 400 SUSPENSION ORAL at 07:31

## 2018-01-01 RX ADMIN — Medication 10 ML: at 18:06

## 2018-01-01 RX ADMIN — MIRTAZAPINE 30 MG: 15 TABLET, FILM COATED ORAL at 21:00

## 2018-01-01 RX ADMIN — BUMETANIDE 2 MG: 1 TABLET ORAL at 08:31

## 2018-01-01 RX ADMIN — IPRATROPIUM BROMIDE AND ALBUTEROL SULFATE 1 AMPULE: .5; 3 SOLUTION RESPIRATORY (INHALATION) at 12:12

## 2018-01-01 RX ADMIN — Medication 10 ML: at 09:00

## 2018-01-01 RX ADMIN — INSULIN LISPRO 2 UNITS: 100 INJECTION, SOLUTION INTRAVENOUS; SUBCUTANEOUS at 16:47

## 2018-01-01 RX ADMIN — ALLOPURINOL 300 MG: 300 TABLET ORAL at 13:42

## 2018-01-01 RX ADMIN — HYDRALAZINE HYDROCHLORIDE 25 MG: 25 TABLET, FILM COATED ORAL at 21:35

## 2018-01-01 RX ADMIN — BUMETANIDE 2 MG: 1 TABLET ORAL at 08:51

## 2018-01-01 RX ADMIN — GLUCAGON HYDROCHLORIDE 1 MG: KIT at 03:24

## 2018-01-01 RX ADMIN — ISOSORBIDE MONONITRATE 60 MG: 60 TABLET, EXTENDED RELEASE ORAL at 10:41

## 2018-01-01 RX ADMIN — MORPHINE SULFATE 1 MG: 2 INJECTION, SOLUTION INTRAMUSCULAR; INTRAVENOUS at 07:04

## 2018-01-01 RX ADMIN — ONDANSETRON 4 MG: 2 INJECTION INTRAMUSCULAR; INTRAVENOUS at 15:30

## 2018-01-01 RX ADMIN — Medication 10 ML: at 08:53

## 2018-01-01 RX ADMIN — ISOSORBIDE MONONITRATE 120 MG: 60 TABLET ORAL at 08:18

## 2018-01-01 RX ADMIN — METOPROLOL SUCCINATE 50 MG: 50 TABLET, EXTENDED RELEASE ORAL at 10:02

## 2018-01-01 RX ADMIN — ISOSORBIDE MONONITRATE 60 MG: 60 TABLET, EXTENDED RELEASE ORAL at 09:07

## 2018-01-01 RX ADMIN — BUMETANIDE 2 MG: 1 TABLET ORAL at 20:28

## 2018-01-01 RX ADMIN — Medication 10 ML: at 22:21

## 2018-01-01 RX ADMIN — ISOSORBIDE MONONITRATE 60 MG: 60 TABLET, EXTENDED RELEASE ORAL at 21:32

## 2018-01-01 RX ADMIN — SENNA PLUS 8.6 MG: 8.6 TABLET ORAL at 18:06

## 2018-01-01 RX ADMIN — POTASSIUM CHLORIDE 20 MEQ: 20 TABLET, EXTENDED RELEASE ORAL at 08:29

## 2018-01-01 RX ADMIN — BUMETANIDE 2 MG: 1 TABLET ORAL at 08:25

## 2018-01-01 RX ADMIN — ALLOPURINOL 300 MG: 300 TABLET ORAL at 11:46

## 2018-01-01 RX ADMIN — HYDROCODONE BITARTRATE AND ACETAMINOPHEN 1 TABLET: 5; 325 TABLET ORAL at 14:29

## 2018-01-01 RX ADMIN — FUROSEMIDE 20 MG: 10 INJECTION, SOLUTION INTRAVENOUS at 10:10

## 2018-01-01 RX ADMIN — PETROLATUM: 42 OINTMENT TOPICAL at 21:15

## 2018-01-01 RX ADMIN — METOPROLOL SUCCINATE 200 MG: 100 TABLET, EXTENDED RELEASE ORAL at 08:55

## 2018-01-01 RX ADMIN — FUROSEMIDE 40 MG: 10 INJECTION, SOLUTION INTRAMUSCULAR; INTRAVENOUS at 10:07

## 2018-01-01 RX ADMIN — HYDRALAZINE HYDROCHLORIDE 25 MG: 25 TABLET, FILM COATED ORAL at 06:06

## 2018-01-01 RX ADMIN — METOPROLOL SUCCINATE 200 MG: 100 TABLET, FILM COATED, EXTENDED RELEASE ORAL at 21:14

## 2018-01-01 RX ADMIN — METOPROLOL SUCCINATE 200 MG: 100 TABLET, EXTENDED RELEASE ORAL at 22:08

## 2018-01-01 RX ADMIN — FERROUS SULFATE TAB 325 MG (65 MG ELEMENTAL FE) 325 MG: 325 (65 FE) TAB at 12:12

## 2018-01-01 RX ADMIN — INSULIN LISPRO 3 UNITS: 100 INJECTION, SOLUTION INTRAVENOUS; SUBCUTANEOUS at 20:42

## 2018-01-01 RX ADMIN — PETROLATUM: 42 OINTMENT TOPICAL at 12:25

## 2018-01-01 RX ADMIN — MEXILETINE HYDROCHLORIDE 250 MG: 250 CAPSULE ORAL at 09:10

## 2018-01-01 RX ADMIN — METOPROLOL SUCCINATE 200 MG: 100 TABLET, FILM COATED, EXTENDED RELEASE ORAL at 20:24

## 2018-01-01 RX ADMIN — HYDRALAZINE HYDROCHLORIDE 25 MG: 25 TABLET, FILM COATED ORAL at 05:13

## 2018-01-01 RX ADMIN — SODIUM CHLORIDE: 9 INJECTION, SOLUTION INTRAVENOUS at 06:41

## 2018-01-01 RX ADMIN — ISOSORBIDE MONONITRATE 60 MG: 60 TABLET, EXTENDED RELEASE ORAL at 10:19

## 2018-01-01 RX ADMIN — BUMETANIDE 2 MG: 1 TABLET ORAL at 21:42

## 2018-01-01 RX ADMIN — HYDRALAZINE HYDROCHLORIDE 25 MG: 25 TABLET, FILM COATED ORAL at 09:02

## 2018-01-01 RX ADMIN — INSULIN LISPRO 2 UNITS: 100 INJECTION, SOLUTION INTRAVENOUS; SUBCUTANEOUS at 20:52

## 2018-01-01 RX ADMIN — BUMETANIDE 2 MG: 1 TABLET ORAL at 17:21

## 2018-01-01 RX ADMIN — HYDRALAZINE HYDROCHLORIDE 25 MG: 25 TABLET, FILM COATED ORAL at 05:07

## 2018-01-01 RX ADMIN — CEFTRIAXONE SODIUM 1 G: 1 INJECTION, POWDER, FOR SOLUTION INTRAMUSCULAR; INTRAVENOUS at 20:52

## 2018-01-01 RX ADMIN — INSULIN LISPRO 2 UNITS: 100 INJECTION, SOLUTION INTRAVENOUS; SUBCUTANEOUS at 20:09

## 2018-01-01 RX ADMIN — HYDRALAZINE HYDROCHLORIDE 25 MG: 25 TABLET, FILM COATED ORAL at 05:27

## 2018-01-01 RX ADMIN — HYDRALAZINE HYDROCHLORIDE 25 MG: 25 TABLET ORAL at 21:10

## 2018-01-01 RX ADMIN — ALLOPURINOL 300 MG: 300 TABLET ORAL at 09:00

## 2018-01-01 RX ADMIN — INSULIN LISPRO 6 UNITS: 100 INJECTION, SOLUTION INTRAVENOUS; SUBCUTANEOUS at 11:41

## 2018-01-01 RX ADMIN — INSULIN LISPRO 9 UNITS: 100 INJECTION, SOLUTION INTRAVENOUS; SUBCUTANEOUS at 11:54

## 2018-01-01 RX ADMIN — Medication 10 ML: at 23:44

## 2018-01-01 RX ADMIN — PETROLATUM: 42 OINTMENT TOPICAL at 08:30

## 2018-01-01 RX ADMIN — INSULIN GLARGINE 25 UNITS: 100 INJECTION, SOLUTION SUBCUTANEOUS at 09:10

## 2018-01-01 RX ADMIN — MICAFUNGIN SODIUM 100 MG: 20 INJECTION, POWDER, LYOPHILIZED, FOR SOLUTION INTRAVENOUS at 10:02

## 2018-01-01 RX ADMIN — Medication 10 ML: at 11:03

## 2018-01-01 RX ADMIN — INSULIN LISPRO 2 UNITS: 100 INJECTION, SOLUTION INTRAVENOUS; SUBCUTANEOUS at 17:36

## 2018-01-01 RX ADMIN — PIPERACILLIN SODIUM AND TAZOBACTAM SODIUM 3.38 G: 3; .375 INJECTION, POWDER, LYOPHILIZED, FOR SOLUTION INTRAVENOUS at 12:05

## 2018-01-01 RX ADMIN — Medication 10 ML: at 14:18

## 2018-01-01 RX ADMIN — PIPERACILLIN SODIUM AND TAZOBACTAM SODIUM 3.38 G: 3; .375 INJECTION, POWDER, LYOPHILIZED, FOR SOLUTION INTRAVENOUS at 00:12

## 2018-01-01 RX ADMIN — ALLOPURINOL 300 MG: 300 TABLET ORAL at 21:41

## 2018-01-01 RX ADMIN — Medication 10 ML: at 09:12

## 2018-01-01 RX ADMIN — ISOSORBIDE MONONITRATE 60 MG: 60 TABLET, EXTENDED RELEASE ORAL at 21:16

## 2018-01-01 RX ADMIN — INSULIN LISPRO 6 UNITS: 100 INJECTION, SOLUTION INTRAVENOUS; SUBCUTANEOUS at 12:53

## 2018-01-01 RX ADMIN — MEXILETINE HYDROCHLORIDE 250 MG: 250 CAPSULE ORAL at 06:07

## 2018-01-01 RX ADMIN — HYDRALAZINE HYDROCHLORIDE 25 MG: 25 TABLET, FILM COATED ORAL at 05:58

## 2018-01-01 RX ADMIN — MEXILETINE HYDROCHLORIDE 250 MG: 250 CAPSULE ORAL at 14:01

## 2018-01-01 RX ADMIN — HYDRALAZINE HYDROCHLORIDE 25 MG: 25 TABLET, FILM COATED ORAL at 09:21

## 2018-01-01 RX ADMIN — PETROLATUM: 42 OINTMENT TOPICAL at 20:49

## 2018-01-01 RX ADMIN — SENNOSIDES 17.2 MG: 8.6 TABLET, FILM COATED ORAL at 09:31

## 2018-01-01 RX ADMIN — Medication 10 ML: at 16:39

## 2018-01-01 RX ADMIN — IPRATROPIUM BROMIDE AND ALBUTEROL SULFATE 1 AMPULE: .5; 3 SOLUTION RESPIRATORY (INHALATION) at 19:35

## 2018-01-01 RX ADMIN — PHENAZOPYRIDINE HYDROCHLORIDE 100 MG: 100 TABLET ORAL at 17:59

## 2018-01-01 RX ADMIN — HYDROCORTISONE SODIUM SUCCINATE 50 MG: 100 INJECTION, POWDER, FOR SOLUTION INTRAMUSCULAR; INTRAVENOUS at 04:22

## 2018-01-01 RX ADMIN — ENOXAPARIN SODIUM 40 MG: 40 INJECTION SUBCUTANEOUS at 08:25

## 2018-01-01 RX ADMIN — MUPIROCIN: 20 OINTMENT TOPICAL at 20:42

## 2018-01-01 RX ADMIN — MEXILETINE HYDROCHLORIDE 250 MG: 250 CAPSULE ORAL at 21:59

## 2018-01-01 RX ADMIN — POLYETHYLENE GLYCOL 3350 17 G: 17 POWDER, FOR SOLUTION ORAL at 15:04

## 2018-01-01 RX ADMIN — METOPROLOL SUCCINATE 200 MG: 100 TABLET, EXTENDED RELEASE ORAL at 10:40

## 2018-01-01 RX ADMIN — CEFTRIAXONE 1 G: 1 INJECTION, POWDER, FOR SOLUTION INTRAMUSCULAR; INTRAVENOUS at 16:48

## 2018-01-01 RX ADMIN — MICAFUNGIN SODIUM 100 MG: 20 INJECTION, POWDER, LYOPHILIZED, FOR SOLUTION INTRAVENOUS at 12:26

## 2018-01-01 RX ADMIN — MEXILETINE HYDROCHLORIDE 250 MG: 250 CAPSULE ORAL at 22:07

## 2018-01-01 RX ADMIN — PETROLATUM: 42 OINTMENT TOPICAL at 13:43

## 2018-01-01 RX ADMIN — CEFEPIME 2 G: 2 INJECTION, POWDER, FOR SOLUTION INTRAVENOUS at 10:38

## 2018-01-01 RX ADMIN — INSULIN LISPRO 2 UNITS: 100 INJECTION, SOLUTION INTRAVENOUS; SUBCUTANEOUS at 17:50

## 2018-01-01 RX ADMIN — METOPROLOL SUCCINATE 200 MG: 100 TABLET, EXTENDED RELEASE ORAL at 21:21

## 2018-01-01 RX ADMIN — MEXILETINE HYDROCHLORIDE 200 MG: 200 CAPSULE ORAL at 14:13

## 2018-01-01 RX ADMIN — MEXILETINE HYDROCHLORIDE 250 MG: 250 CAPSULE ORAL at 22:10

## 2018-01-01 RX ADMIN — INSULIN LISPRO 2 UNITS: 100 INJECTION, SOLUTION INTRAVENOUS; SUBCUTANEOUS at 12:08

## 2018-01-01 RX ADMIN — INSULIN LISPRO 1 UNITS: 100 INJECTION, SOLUTION INTRAVENOUS; SUBCUTANEOUS at 21:42

## 2018-01-01 RX ADMIN — HYDRALAZINE HYDROCHLORIDE 25 MG: 25 TABLET, FILM COATED ORAL at 13:50

## 2018-01-01 RX ADMIN — METOPROLOL SUCCINATE 200 MG: 100 TABLET, FILM COATED, EXTENDED RELEASE ORAL at 22:00

## 2018-01-01 RX ADMIN — MAGNESIUM HYDROXIDE 30 ML: 400 SUSPENSION ORAL at 12:22

## 2018-01-01 RX ADMIN — HYDRALAZINE HYDROCHLORIDE 25 MG: 25 TABLET, FILM COATED ORAL at 22:09

## 2018-01-01 RX ADMIN — INSULIN LISPRO 4 UNITS: 100 INJECTION, SOLUTION INTRAVENOUS; SUBCUTANEOUS at 12:41

## 2018-01-01 RX ADMIN — INSULIN LISPRO 1 UNITS: 100 INJECTION, SOLUTION INTRAVENOUS; SUBCUTANEOUS at 21:19

## 2018-01-01 RX ADMIN — INSULIN LISPRO 2 UNITS: 100 INJECTION, SOLUTION INTRAVENOUS; SUBCUTANEOUS at 11:41

## 2018-01-01 RX ADMIN — HYDRALAZINE HYDROCHLORIDE 25 MG: 25 TABLET, FILM COATED ORAL at 05:45

## 2018-01-01 RX ADMIN — INSULIN GLARGINE 40 UNITS: 100 INJECTION, SOLUTION SUBCUTANEOUS at 09:14

## 2018-01-01 RX ADMIN — PETROLATUM: 42 OINTMENT TOPICAL at 09:00

## 2018-01-01 RX ADMIN — IPRATROPIUM BROMIDE AND ALBUTEROL SULFATE 1 AMPULE: .5; 3 SOLUTION RESPIRATORY (INHALATION) at 15:42

## 2018-01-01 RX ADMIN — PIPERACILLIN SODIUM AND TAZOBACTAM SODIUM 3.38 G: 3; .375 INJECTION, POWDER, LYOPHILIZED, FOR SOLUTION INTRAVENOUS at 06:06

## 2018-01-01 RX ADMIN — INSULIN LISPRO 4 UNITS: 100 INJECTION, SOLUTION INTRAVENOUS; SUBCUTANEOUS at 12:47

## 2018-01-01 RX ADMIN — FERROUS SULFATE TAB 325 MG (65 MG ELEMENTAL FE) 325 MG: 325 (65 FE) TAB at 11:46

## 2018-01-01 RX ADMIN — ALLOPURINOL 300 MG: 300 TABLET ORAL at 12:22

## 2018-01-01 RX ADMIN — METOPROLOL SUCCINATE 200 MG: 100 TABLET, FILM COATED, EXTENDED RELEASE ORAL at 20:28

## 2018-01-01 RX ADMIN — INSULIN LISPRO 6 UNITS: 100 INJECTION, SOLUTION INTRAVENOUS; SUBCUTANEOUS at 17:06

## 2018-01-01 RX ADMIN — MEXILETINE HYDROCHLORIDE 250 MG: 250 CAPSULE ORAL at 22:43

## 2018-01-01 RX ADMIN — ALLOPURINOL 300 MG: 300 TABLET ORAL at 11:36

## 2018-01-01 RX ADMIN — HYDRALAZINE HYDROCHLORIDE 25 MG: 25 TABLET, FILM COATED ORAL at 06:18

## 2018-01-01 RX ADMIN — MORPHINE SULFATE 1 MG: 2 INJECTION, SOLUTION INTRAMUSCULAR; INTRAVENOUS at 07:31

## 2018-01-01 RX ADMIN — HYDRALAZINE HYDROCHLORIDE 25 MG: 25 TABLET, FILM COATED ORAL at 00:32

## 2018-01-01 RX ADMIN — FUROSEMIDE 40 MG: 10 INJECTION, SOLUTION INTRAMUSCULAR; INTRAVENOUS at 09:32

## 2018-01-01 RX ADMIN — ATROPA BELLADONNA AND OPIUM 60 MG: 16.2; 3 SUPPOSITORY RECTAL at 18:20

## 2018-01-01 RX ADMIN — INSULIN LISPRO 1 UNITS: 100 INJECTION, SOLUTION INTRAVENOUS; SUBCUTANEOUS at 21:09

## 2018-01-01 RX ADMIN — MEXILETINE HYDROCHLORIDE 250 MG: 250 CAPSULE ORAL at 22:12

## 2018-01-01 RX ADMIN — ALLOPURINOL 300 MG: 300 TABLET ORAL at 08:50

## 2018-01-01 RX ADMIN — Medication 10 ML: at 23:45

## 2018-01-01 RX ADMIN — INSULIN GLARGINE 20 UNITS: 100 INJECTION, SOLUTION SUBCUTANEOUS at 22:22

## 2018-01-01 RX ADMIN — PETROLATUM: 42 OINTMENT TOPICAL at 08:10

## 2018-01-01 RX ADMIN — METOPROLOL SUCCINATE 200 MG: 100 TABLET, EXTENDED RELEASE ORAL at 21:16

## 2018-01-01 RX ADMIN — SENNOSIDES 17.2 MG: 8.6 TABLET, FILM COATED ORAL at 08:25

## 2018-01-01 RX ADMIN — Medication 10 ML: at 21:10

## 2018-01-01 RX ADMIN — HYDRALAZINE HYDROCHLORIDE 25 MG: 25 TABLET, FILM COATED ORAL at 14:13

## 2018-01-01 RX ADMIN — MEXILETINE HYDROCHLORIDE 250 MG: 250 CAPSULE ORAL at 11:53

## 2018-01-01 RX ADMIN — AMOXICILLIN AND CLAVULANATE POTASSIUM 1 TABLET: 875; 125 TABLET, FILM COATED ORAL at 21:10

## 2018-01-01 RX ADMIN — CHOLECALCIFEROL TAB 25 MCG (1000 UNIT) 1000 UNITS: 25 TAB at 11:36

## 2018-01-01 RX ADMIN — PETROLATUM: 42 OINTMENT TOPICAL at 20:10

## 2018-01-01 RX ADMIN — ALLOPURINOL 300 MG: 300 TABLET ORAL at 12:26

## 2018-01-01 RX ADMIN — HYDRALAZINE HYDROCHLORIDE 25 MG: 25 TABLET, FILM COATED ORAL at 05:23

## 2018-01-01 RX ADMIN — MEXILETINE HYDROCHLORIDE 250 MG: 250 CAPSULE ORAL at 14:19

## 2018-01-01 RX ADMIN — HYDRALAZINE HYDROCHLORIDE 25 MG: 25 TABLET, FILM COATED ORAL at 14:22

## 2018-01-01 RX ADMIN — POTASSIUM CHLORIDE 20 MEQ: 20 TABLET, EXTENDED RELEASE ORAL at 21:09

## 2018-01-01 RX ADMIN — MEXILETINE HYDROCHLORIDE 250 MG: 250 CAPSULE ORAL at 13:50

## 2018-01-01 RX ADMIN — FUROSEMIDE 40 MG: 10 INJECTION, SOLUTION INTRAMUSCULAR; INTRAVENOUS at 18:50

## 2018-01-01 RX ADMIN — INSULIN LISPRO 2 UNITS: 100 INJECTION, SOLUTION INTRAVENOUS; SUBCUTANEOUS at 12:24

## 2018-01-01 RX ADMIN — OSELTAMIVIR PHOSPHATE 30 MG: 30 CAPSULE ORAL at 20:13

## 2018-01-01 RX ADMIN — MEXILETINE HYDROCHLORIDE 250 MG: 250 CAPSULE ORAL at 14:29

## 2018-01-01 RX ADMIN — ISOSORBIDE MONONITRATE 60 MG: 60 TABLET, EXTENDED RELEASE ORAL at 21:15

## 2018-01-01 RX ADMIN — HYDRALAZINE HYDROCHLORIDE 25 MG: 25 TABLET ORAL at 15:23

## 2018-01-01 RX ADMIN — Medication 10 ML: at 11:01

## 2018-01-01 RX ADMIN — HYDRALAZINE HYDROCHLORIDE 25 MG: 25 TABLET, FILM COATED ORAL at 06:43

## 2018-01-01 RX ADMIN — ISOSORBIDE MONONITRATE 60 MG: 60 TABLET ORAL at 08:43

## 2018-01-01 RX ADMIN — METOPROLOL SUCCINATE 50 MG: 50 TABLET, EXTENDED RELEASE ORAL at 20:38

## 2018-01-01 RX ADMIN — FUROSEMIDE 40 MG: 10 INJECTION, SOLUTION INTRAMUSCULAR; INTRAVENOUS at 08:33

## 2018-01-01 RX ADMIN — HYDRALAZINE HYDROCHLORIDE 25 MG: 25 TABLET, FILM COATED ORAL at 21:00

## 2018-01-01 RX ADMIN — MIRTAZAPINE 30 MG: 15 TABLET, FILM COATED ORAL at 21:21

## 2018-01-01 RX ADMIN — INSULIN LISPRO 1 UNITS: 100 INJECTION, SOLUTION INTRAVENOUS; SUBCUTANEOUS at 21:02

## 2018-01-01 RX ADMIN — MEXILETINE HYDROCHLORIDE 250 MG: 250 CAPSULE ORAL at 15:21

## 2018-01-01 RX ADMIN — VITAMIN D, TAB 1000IU (100/BT) 1000 UNITS: 25 TAB at 11:42

## 2018-01-01 RX ADMIN — ALLOPURINOL 300 MG: 300 TABLET ORAL at 11:41

## 2018-01-01 RX ADMIN — HYDRALAZINE HYDROCHLORIDE 10 MG: 10 TABLET, FILM COATED ORAL at 06:24

## 2018-01-01 RX ADMIN — BUMETANIDE 2 MG: 0.25 INJECTION INTRAMUSCULAR; INTRAVENOUS at 17:20

## 2018-01-01 RX ADMIN — GLYCOPYRROLATE 0.2 MG: 0.2 INJECTION INTRAMUSCULAR; INTRAVENOUS at 12:15

## 2018-01-01 RX ADMIN — MEXILETINE HYDROCHLORIDE 250 MG: 250 CAPSULE ORAL at 13:49

## 2018-01-01 RX ADMIN — PETROLATUM: 42 OINTMENT TOPICAL at 22:06

## 2018-01-01 RX ADMIN — ALLOPURINOL 300 MG: 300 TABLET ORAL at 18:30

## 2018-01-01 RX ADMIN — INSULIN GLARGINE 50 UNITS: 100 INJECTION, SOLUTION SUBCUTANEOUS at 21:14

## 2018-01-01 RX ADMIN — Medication 10 ML: at 09:40

## 2018-01-01 RX ADMIN — Medication 10 ML: at 07:32

## 2018-01-01 RX ADMIN — ALLOPURINOL 300 MG: 300 TABLET ORAL at 09:03

## 2018-01-01 RX ADMIN — INSULIN LISPRO 4 UNITS: 100 INJECTION, SOLUTION INTRAVENOUS; SUBCUTANEOUS at 12:22

## 2018-01-01 RX ADMIN — FUROSEMIDE 40 MG: 10 INJECTION, SOLUTION INTRAMUSCULAR; INTRAVENOUS at 16:36

## 2018-01-01 RX ADMIN — HYDRALAZINE HYDROCHLORIDE 25 MG: 25 TABLET, FILM COATED ORAL at 21:14

## 2018-01-01 RX ADMIN — CALCIUM GLUCONATE 1 G: 98 INJECTION, SOLUTION INTRAVENOUS at 03:40

## 2018-01-01 RX ADMIN — ALLOPURINOL 300 MG: 300 TABLET ORAL at 12:58

## 2018-01-01 RX ADMIN — Medication 10 ML: at 08:15

## 2018-01-01 RX ADMIN — HYDRALAZINE HYDROCHLORIDE 25 MG: 25 TABLET, FILM COATED ORAL at 06:55

## 2018-01-01 RX ADMIN — WARFARIN SODIUM 3 MG: 3 TABLET ORAL at 17:48

## 2018-01-01 RX ADMIN — HYDRALAZINE HYDROCHLORIDE 25 MG: 25 TABLET, FILM COATED ORAL at 22:07

## 2018-01-01 RX ADMIN — ISOSORBIDE MONONITRATE 60 MG: 60 TABLET ORAL at 20:24

## 2018-01-01 RX ADMIN — ATROPA BELLADONNA AND OPIUM 60 MG: 16.2; 3 SUPPOSITORY RECTAL at 22:56

## 2018-01-01 RX ADMIN — MUPIROCIN: 20 OINTMENT TOPICAL at 08:26

## 2018-01-01 RX ADMIN — MEXILETINE HYDROCHLORIDE 200 MG: 200 CAPSULE ORAL at 21:10

## 2018-01-01 RX ADMIN — MEXILETINE HYDROCHLORIDE 250 MG: 250 CAPSULE ORAL at 13:35

## 2018-01-01 RX ADMIN — ISOSORBIDE MONONITRATE 60 MG: 60 TABLET ORAL at 09:00

## 2018-01-01 RX ADMIN — BUMETANIDE 2 MG: 0.25 INJECTION INTRAMUSCULAR; INTRAVENOUS at 08:31

## 2018-01-01 RX ADMIN — PETROLATUM: 42 OINTMENT TOPICAL at 09:14

## 2018-01-01 RX ADMIN — MEXILETINE HYDROCHLORIDE 250 MG: 250 CAPSULE ORAL at 09:31

## 2018-01-01 RX ADMIN — Medication 10 ML: at 05:57

## 2018-01-01 RX ADMIN — INSULIN LISPRO 6 UNITS: 100 INJECTION, SOLUTION INTRAVENOUS; SUBCUTANEOUS at 16:56

## 2018-01-01 RX ADMIN — PETROLATUM: 42 OINTMENT TOPICAL at 10:02

## 2018-01-01 RX ADMIN — HYDRALAZINE HYDROCHLORIDE 25 MG: 25 TABLET, FILM COATED ORAL at 05:57

## 2018-01-01 RX ADMIN — MEXILETINE HYDROCHLORIDE 250 MG: 250 CAPSULE ORAL at 06:09

## 2018-01-01 RX ADMIN — INSULIN GLARGINE 40 UNITS: 100 INJECTION, SOLUTION SUBCUTANEOUS at 08:56

## 2018-01-01 RX ADMIN — METOPROLOL SUCCINATE 200 MG: 100 TABLET, EXTENDED RELEASE ORAL at 08:49

## 2018-01-01 RX ADMIN — IPRATROPIUM BROMIDE AND ALBUTEROL SULFATE 1 AMPULE: .5; 3 SOLUTION RESPIRATORY (INHALATION) at 11:26

## 2018-01-01 RX ADMIN — SODIUM CHLORIDE: 9 INJECTION, SOLUTION INTRAVENOUS at 09:02

## 2018-01-01 RX ADMIN — Medication 10 ML: at 09:13

## 2018-01-01 RX ADMIN — MEXILETINE HYDROCHLORIDE 200 MG: 200 CAPSULE ORAL at 08:25

## 2018-01-01 RX ADMIN — MEXILETINE HYDROCHLORIDE 200 MG: 200 CAPSULE ORAL at 06:58

## 2018-01-01 RX ADMIN — INSULIN LISPRO 3 UNITS: 100 INJECTION, SOLUTION INTRAVENOUS; SUBCUTANEOUS at 23:41

## 2018-01-01 RX ADMIN — ISOSORBIDE MONONITRATE 60 MG: 60 TABLET ORAL at 08:31

## 2018-01-01 RX ADMIN — FERROUS SULFATE TAB 325 MG (65 MG ELEMENTAL FE) 325 MG: 325 (65 FE) TAB at 12:11

## 2018-01-01 RX ADMIN — Medication 10 ML: at 10:06

## 2018-01-01 RX ADMIN — Medication 10 ML: at 22:01

## 2018-01-01 RX ADMIN — METOPROLOL SUCCINATE 200 MG: 100 TABLET, FILM COATED, EXTENDED RELEASE ORAL at 20:14

## 2018-01-01 RX ADMIN — DEXTROSE 15 G: 15 GEL ORAL at 06:11

## 2018-01-01 RX ADMIN — ACETAMINOPHEN 650 MG: 325 TABLET ORAL at 20:30

## 2018-01-01 RX ADMIN — ENOXAPARIN SODIUM 40 MG: 40 INJECTION SUBCUTANEOUS at 18:27

## 2018-01-01 RX ADMIN — INSULIN LISPRO 2 UNITS: 100 INJECTION, SOLUTION INTRAVENOUS; SUBCUTANEOUS at 22:13

## 2018-01-01 RX ADMIN — MEXILETINE HYDROCHLORIDE 250 MG: 250 CAPSULE ORAL at 13:31

## 2018-01-01 RX ADMIN — PETROLATUM: 42 OINTMENT TOPICAL at 13:42

## 2018-01-01 RX ADMIN — HYDRALAZINE HYDROCHLORIDE 25 MG: 25 TABLET, FILM COATED ORAL at 21:59

## 2018-01-01 RX ADMIN — MEXILETINE HYDROCHLORIDE 250 MG: 250 CAPSULE ORAL at 05:55

## 2018-01-01 RX ADMIN — INSULIN GLARGINE 50 UNITS: 100 INJECTION, SOLUTION SUBCUTANEOUS at 08:51

## 2018-01-01 RX ADMIN — INSULIN GLARGINE 20 UNITS: 100 INJECTION, SOLUTION SUBCUTANEOUS at 21:58

## 2018-01-01 RX ADMIN — Medication 10 ML: at 08:05

## 2018-01-01 RX ADMIN — INSULIN GLARGINE 25 UNITS: 100 INJECTION, SOLUTION SUBCUTANEOUS at 20:25

## 2018-01-01 RX ADMIN — FERROUS SULFATE TAB 325 MG (65 MG ELEMENTAL FE) 325 MG: 325 (65 FE) TAB at 12:22

## 2018-01-01 RX ADMIN — INSULIN LISPRO 3 UNITS: 100 INJECTION, SOLUTION INTRAVENOUS; SUBCUTANEOUS at 20:24

## 2018-01-01 RX ADMIN — IPRATROPIUM BROMIDE AND ALBUTEROL SULFATE 1 AMPULE: .5; 3 SOLUTION RESPIRATORY (INHALATION) at 19:30

## 2018-01-01 RX ADMIN — BUMETANIDE 2 MG: 1 TABLET ORAL at 08:43

## 2018-01-01 RX ADMIN — ALLOPURINOL 300 MG: 300 TABLET ORAL at 12:01

## 2018-01-01 RX ADMIN — ISOSORBIDE MONONITRATE 60 MG: 60 TABLET, EXTENDED RELEASE ORAL at 21:59

## 2018-01-01 RX ADMIN — Medication 10 ML: at 17:49

## 2018-01-01 RX ADMIN — FERROUS SULFATE TAB 325 MG (65 MG ELEMENTAL FE) 325 MG: 325 (65 FE) TAB at 08:31

## 2018-01-01 RX ADMIN — FERROUS SULFATE TAB 325 MG (65 MG ELEMENTAL FE) 325 MG: 325 (65 FE) TAB at 12:00

## 2018-01-01 RX ADMIN — BUMETANIDE 2 MG: 0.25 INJECTION INTRAMUSCULAR; INTRAVENOUS at 18:30

## 2018-01-01 RX ADMIN — INSULIN LISPRO 4 UNITS: 100 INJECTION, SOLUTION INTRAVENOUS; SUBCUTANEOUS at 16:44

## 2018-01-01 RX ADMIN — IPRATROPIUM BROMIDE AND ALBUTEROL SULFATE 1 AMPULE: .5; 3 SOLUTION RESPIRATORY (INHALATION) at 16:34

## 2018-01-01 RX ADMIN — MICAFUNGIN SODIUM 100 MG: 20 INJECTION, POWDER, LYOPHILIZED, FOR SOLUTION INTRAVENOUS at 18:27

## 2018-01-01 RX ADMIN — LORAZEPAM 0.5 MG: 0.5 TABLET ORAL at 08:44

## 2018-01-01 RX ADMIN — MEXILETINE HYDROCHLORIDE 250 MG: 250 CAPSULE ORAL at 06:24

## 2018-01-01 RX ADMIN — Medication 10 ML: at 21:07

## 2018-01-01 RX ADMIN — INSULIN LISPRO 4 UNITS: 100 INJECTION, SOLUTION INTRAVENOUS; SUBCUTANEOUS at 09:14

## 2018-01-01 RX ADMIN — METOPROLOL SUCCINATE 200 MG: 100 TABLET, EXTENDED RELEASE ORAL at 20:21

## 2018-01-01 RX ADMIN — INSULIN LISPRO 4 UNITS: 100 INJECTION, SOLUTION INTRAVENOUS; SUBCUTANEOUS at 16:57

## 2018-01-01 RX ADMIN — PIPERACILLIN SODIUM AND TAZOBACTAM SODIUM 3.38 G: 3; .375 INJECTION, POWDER, LYOPHILIZED, FOR SOLUTION INTRAVENOUS at 05:57

## 2018-01-01 RX ADMIN — IPRATROPIUM BROMIDE AND ALBUTEROL SULFATE 1 AMPULE: .5; 3 SOLUTION RESPIRATORY (INHALATION) at 08:50

## 2018-01-01 RX ADMIN — Medication 10 ML: at 10:09

## 2018-01-01 RX ADMIN — FLUCONAZOLE 200 MG: 2 INJECTION, SOLUTION INTRAVENOUS at 10:42

## 2018-01-01 RX ADMIN — POTASSIUM CHLORIDE 20 MEQ: 20 TABLET, EXTENDED RELEASE ORAL at 22:11

## 2018-01-01 RX ADMIN — HYDRALAZINE HYDROCHLORIDE 25 MG: 25 TABLET, FILM COATED ORAL at 21:30

## 2018-01-01 RX ADMIN — Medication 10 ML: at 22:08

## 2018-01-01 RX ADMIN — OSELTAMIVIR PHOSPHATE 30 MG: 30 CAPSULE ORAL at 08:22

## 2018-01-01 RX ADMIN — PETROLATUM: 42 OINTMENT TOPICAL at 09:07

## 2018-01-01 RX ADMIN — IPRATROPIUM BROMIDE AND ALBUTEROL SULFATE 1 AMPULE: .5; 3 SOLUTION RESPIRATORY (INHALATION) at 08:57

## 2018-01-01 RX ADMIN — PETROLATUM: 42 OINTMENT TOPICAL at 07:40

## 2018-01-01 RX ADMIN — MEXILETINE HYDROCHLORIDE 250 MG: 250 CAPSULE ORAL at 14:18

## 2018-01-01 RX ADMIN — PETROLATUM: 42 OINTMENT TOPICAL at 11:30

## 2018-01-01 RX ADMIN — INSULIN LISPRO 3 UNITS: 100 INJECTION, SOLUTION INTRAVENOUS; SUBCUTANEOUS at 22:18

## 2018-01-01 RX ADMIN — AMOXICILLIN AND CLAVULANATE POTASSIUM 1 TABLET: 875; 125 TABLET, FILM COATED ORAL at 08:25

## 2018-01-01 RX ADMIN — INSULIN LISPRO 3 UNITS: 100 INJECTION, SOLUTION INTRAVENOUS; SUBCUTANEOUS at 21:42

## 2018-01-01 RX ADMIN — ISOSORBIDE MONONITRATE 60 MG: 60 TABLET ORAL at 23:35

## 2018-01-01 RX ADMIN — HYDRALAZINE HYDROCHLORIDE 25 MG: 25 TABLET, FILM COATED ORAL at 21:04

## 2018-01-01 RX ADMIN — POTASSIUM CHLORIDE 20 MEQ: 20 TABLET, EXTENDED RELEASE ORAL at 22:01

## 2018-01-01 RX ADMIN — CEFTRIAXONE 1 G: 1 INJECTION, POWDER, FOR SOLUTION INTRAMUSCULAR; INTRAVENOUS at 17:34

## 2018-01-01 RX ADMIN — INSULIN LISPRO 4 UNITS: 100 INJECTION, SOLUTION INTRAVENOUS; SUBCUTANEOUS at 08:22

## 2018-01-01 RX ADMIN — INSULIN LISPRO 1 UNITS: 100 INJECTION, SOLUTION INTRAVENOUS; SUBCUTANEOUS at 21:00

## 2018-01-01 RX ADMIN — HYDRALAZINE HYDROCHLORIDE 25 MG: 25 TABLET, FILM COATED ORAL at 22:00

## 2018-01-01 RX ADMIN — INSULIN LISPRO 2 UNITS: 100 INJECTION, SOLUTION INTRAVENOUS; SUBCUTANEOUS at 16:20

## 2018-01-01 RX ADMIN — HYDRALAZINE HYDROCHLORIDE 25 MG: 25 TABLET, FILM COATED ORAL at 06:07

## 2018-01-01 RX ADMIN — CEFEPIME HYDROCHLORIDE 2 G: 2 INJECTION, POWDER, FOR SOLUTION INTRAVENOUS at 18:00

## 2018-01-01 RX ADMIN — MEXILETINE HYDROCHLORIDE 250 MG: 250 CAPSULE ORAL at 20:14

## 2018-01-01 RX ADMIN — MEXILETINE HYDROCHLORIDE 250 MG: 250 CAPSULE ORAL at 06:19

## 2018-01-01 RX ADMIN — SODIUM CHLORIDE: 9 INJECTION, SOLUTION INTRAVENOUS at 11:39

## 2018-01-01 RX ADMIN — ISOSORBIDE MONONITRATE 60 MG: 60 TABLET ORAL at 09:21

## 2018-01-01 RX ADMIN — VITAMIN D, TAB 1000IU (100/BT) 1000 UNITS: 25 TAB at 12:12

## 2018-01-01 RX ADMIN — FLUCONAZOLE 200 MG: 2 INJECTION, SOLUTION INTRAVENOUS at 10:48

## 2018-01-01 RX ADMIN — Medication 10 ML: at 09:02

## 2018-01-01 RX ADMIN — MUPIROCIN: 20 OINTMENT TOPICAL at 20:30

## 2018-01-01 RX ADMIN — MEXILETINE HYDROCHLORIDE 250 MG: 250 CAPSULE ORAL at 22:35

## 2018-01-01 RX ADMIN — POTASSIUM CHLORIDE 20 MEQ: 20 TABLET, EXTENDED RELEASE ORAL at 08:12

## 2018-01-01 RX ADMIN — WARFARIN SODIUM 5 MG: 5 TABLET ORAL at 17:21

## 2018-01-01 RX ADMIN — ALLOPURINOL 300 MG: 300 TABLET ORAL at 12:49

## 2018-01-01 RX ADMIN — INSULIN LISPRO 1 UNITS: 100 INJECTION, SOLUTION INTRAVENOUS; SUBCUTANEOUS at 21:46

## 2018-01-01 RX ADMIN — PIPERACILLIN SODIUM AND TAZOBACTAM SODIUM 3.38 G: 3; .375 INJECTION, POWDER, LYOPHILIZED, FOR SOLUTION INTRAVENOUS at 08:14

## 2018-01-01 RX ADMIN — ISOSORBIDE MONONITRATE 120 MG: 60 TABLET ORAL at 08:50

## 2018-01-01 RX ADMIN — Medication 10 ML: at 14:01

## 2018-01-01 RX ADMIN — INSULIN GLARGINE 25 UNITS: 100 INJECTION, SOLUTION SUBCUTANEOUS at 08:29

## 2018-01-01 RX ADMIN — MEXILETINE HYDROCHLORIDE 250 MG: 250 CAPSULE ORAL at 21:09

## 2018-01-01 RX ADMIN — OSELTAMIVIR PHOSPHATE 30 MG: 30 CAPSULE ORAL at 22:15

## 2018-01-01 RX ADMIN — IPRATROPIUM BROMIDE AND ALBUTEROL SULFATE 1 AMPULE: .5; 3 SOLUTION RESPIRATORY (INHALATION) at 12:34

## 2018-01-01 RX ADMIN — PETROLATUM: 42 OINTMENT TOPICAL at 08:55

## 2018-01-01 RX ADMIN — Medication 10 ML: at 12:26

## 2018-01-01 RX ADMIN — INSULIN GLARGINE 25 UNITS: 100 INJECTION, SOLUTION SUBCUTANEOUS at 08:12

## 2018-01-01 RX ADMIN — HYDROCODONE BITARTRATE AND ACETAMINOPHEN 1 TABLET: 5; 325 TABLET ORAL at 00:31

## 2018-01-01 RX ADMIN — PHYTONADIONE 10 MG: 5 TABLET ORAL at 21:36

## 2018-01-01 RX ADMIN — INSULIN LISPRO 3 UNITS: 100 INJECTION, SOLUTION INTRAVENOUS; SUBCUTANEOUS at 12:30

## 2018-01-01 RX ADMIN — FERROUS SULFATE TAB 325 MG (65 MG ELEMENTAL FE) 325 MG: 325 (65 FE) TAB at 12:05

## 2018-01-01 RX ADMIN — Medication 10 ML: at 09:09

## 2018-01-01 RX ADMIN — ISOSORBIDE MONONITRATE 60 MG: 60 TABLET, EXTENDED RELEASE ORAL at 21:04

## 2018-01-01 RX ADMIN — MIRTAZAPINE 30 MG: 15 TABLET, FILM COATED ORAL at 23:34

## 2018-01-01 RX ADMIN — POTASSIUM CHLORIDE 20 MEQ: 20 TABLET, EXTENDED RELEASE ORAL at 08:33

## 2018-01-01 RX ADMIN — POTASSIUM CHLORIDE 40 MEQ: 20 TABLET, EXTENDED RELEASE ORAL at 15:31

## 2018-01-01 RX ADMIN — MUPIROCIN: 20 OINTMENT TOPICAL at 08:25

## 2018-01-01 RX ADMIN — CEFTRIAXONE 1 G: 1 INJECTION, POWDER, FOR SOLUTION INTRAMUSCULAR; INTRAVENOUS at 08:29

## 2018-01-01 RX ADMIN — POTASSIUM CHLORIDE 20 MEQ: 20 TABLET, EXTENDED RELEASE ORAL at 09:10

## 2018-01-01 RX ADMIN — MICAFUNGIN SODIUM 100 MG: 20 INJECTION, POWDER, LYOPHILIZED, FOR SOLUTION INTRAVENOUS at 10:28

## 2018-01-01 RX ADMIN — BUMETANIDE 2 MG: 1 TABLET ORAL at 21:10

## 2018-01-01 RX ADMIN — MEXILETINE HYDROCHLORIDE 250 MG: 250 CAPSULE ORAL at 09:26

## 2018-01-01 RX ADMIN — METOPROLOL SUCCINATE 200 MG: 100 TABLET, EXTENDED RELEASE ORAL at 09:31

## 2018-01-01 RX ADMIN — INSULIN GLARGINE 25 UNITS: 100 INJECTION, SOLUTION SUBCUTANEOUS at 09:01

## 2018-01-01 RX ADMIN — MIRTAZAPINE 30 MG: 15 TABLET, FILM COATED ORAL at 20:37

## 2018-01-01 RX ADMIN — SODIUM CHLORIDE: 9 INJECTION, SOLUTION INTRAVENOUS at 04:40

## 2018-01-01 RX ADMIN — METOPROLOL SUCCINATE 200 MG: 100 TABLET, EXTENDED RELEASE ORAL at 22:21

## 2018-01-01 RX ADMIN — MEXILETINE HYDROCHLORIDE 250 MG: 250 CAPSULE ORAL at 22:30

## 2018-01-01 RX ADMIN — PETROLATUM: 42 OINTMENT TOPICAL at 20:15

## 2018-01-01 RX ADMIN — ALLOPURINOL 300 MG: 300 TABLET ORAL at 16:22

## 2018-01-01 RX ADMIN — INSULIN LISPRO 3 UNITS: 100 INJECTION, SOLUTION INTRAVENOUS; SUBCUTANEOUS at 17:36

## 2018-01-01 RX ADMIN — METOPROLOL SUCCINATE 200 MG: 100 TABLET, EXTENDED RELEASE ORAL at 09:14

## 2018-01-01 RX ADMIN — POTASSIUM CHLORIDE 20 MEQ: 20 TABLET, EXTENDED RELEASE ORAL at 09:14

## 2018-01-01 RX ADMIN — INSULIN GLARGINE 20 UNITS: 100 INJECTION, SOLUTION SUBCUTANEOUS at 22:06

## 2018-01-01 RX ADMIN — METOPROLOL SUCCINATE 200 MG: 100 TABLET, EXTENDED RELEASE ORAL at 08:12

## 2018-01-01 RX ADMIN — ISOSORBIDE MONONITRATE 60 MG: 60 TABLET, EXTENDED RELEASE ORAL at 20:37

## 2018-01-01 RX ADMIN — ISOSORBIDE MONONITRATE 60 MG: 60 TABLET ORAL at 20:28

## 2018-01-01 RX ADMIN — ENOXAPARIN SODIUM 40 MG: 40 INJECTION SUBCUTANEOUS at 09:31

## 2018-01-01 RX ADMIN — Medication 10 ML: at 20:29

## 2018-01-01 RX ADMIN — HYDRALAZINE HYDROCHLORIDE 25 MG: 25 TABLET, FILM COATED ORAL at 20:24

## 2018-01-01 RX ADMIN — PROPOFOL 150 MG: 10 INJECTION, EMULSION INTRAVENOUS at 09:53

## 2018-01-01 RX ADMIN — MORPHINE SULFATE 2 MG: 2 INJECTION, SOLUTION INTRAMUSCULAR; INTRAVENOUS at 20:25

## 2018-01-01 RX ADMIN — HYDRALAZINE HYDROCHLORIDE 25 MG: 25 TABLET, FILM COATED ORAL at 05:43

## 2018-01-01 RX ADMIN — SODIUM CHLORIDE 250 ML: 9 INJECTION, SOLUTION INTRAVENOUS at 10:37

## 2018-01-01 RX ADMIN — MEXILETINE HYDROCHLORIDE 250 MG: 250 CAPSULE ORAL at 05:51

## 2018-01-01 RX ADMIN — VITAMIN D, TAB 1000IU (100/BT) 1000 UNITS: 25 TAB at 11:55

## 2018-01-01 RX ADMIN — Medication 10 ML: at 09:15

## 2018-01-01 RX ADMIN — INSULIN GLARGINE 25 UNITS: 100 INJECTION, SOLUTION SUBCUTANEOUS at 21:35

## 2018-01-01 RX ADMIN — BUMETANIDE 2 MG: 0.25 INJECTION INTRAMUSCULAR; INTRAVENOUS at 17:49

## 2018-01-01 RX ADMIN — PETROLATUM: 42 OINTMENT TOPICAL at 21:00

## 2018-01-01 RX ADMIN — BUMETANIDE 2 MG: 1 TABLET ORAL at 14:13

## 2018-01-01 RX ADMIN — CEFTRIAXONE 1 G: 1 INJECTION, POWDER, FOR SOLUTION INTRAMUSCULAR; INTRAVENOUS at 17:30

## 2018-01-01 RX ADMIN — MIRTAZAPINE 30 MG: 15 TABLET, FILM COATED ORAL at 20:21

## 2018-01-01 RX ADMIN — WARFARIN SODIUM 3 MG: 3 TABLET ORAL at 10:19

## 2018-01-01 RX ADMIN — HYDRALAZINE HYDROCHLORIDE 25 MG: 25 TABLET, FILM COATED ORAL at 15:05

## 2018-01-01 RX ADMIN — ONDANSETRON 4 MG: 2 INJECTION, SOLUTION INTRAMUSCULAR; INTRAVENOUS at 09:07

## 2018-01-01 RX ADMIN — PETROLATUM: 42 OINTMENT TOPICAL at 20:25

## 2018-01-01 RX ADMIN — PETROLATUM: 42 OINTMENT TOPICAL at 08:19

## 2018-01-01 RX ADMIN — POTASSIUM CHLORIDE 20 MEQ: 1500 TABLET, EXTENDED RELEASE ORAL at 21:15

## 2018-01-01 RX ADMIN — ALLOPURINOL 300 MG: 300 TABLET ORAL at 08:31

## 2018-01-01 RX ADMIN — INSULIN LISPRO 3 UNITS: 100 INJECTION, SOLUTION INTRAVENOUS; SUBCUTANEOUS at 13:30

## 2018-01-01 RX ADMIN — HYDRALAZINE HYDROCHLORIDE 25 MG: 25 TABLET, FILM COATED ORAL at 16:09

## 2018-01-01 RX ADMIN — INSULIN GLARGINE 50 UNITS: 100 INJECTION, SOLUTION SUBCUTANEOUS at 10:23

## 2018-01-01 RX ADMIN — HYDRALAZINE HYDROCHLORIDE 25 MG: 25 TABLET, FILM COATED ORAL at 15:02

## 2018-01-01 RX ADMIN — MEXILETINE HYDROCHLORIDE 250 MG: 250 CAPSULE ORAL at 20:25

## 2018-01-01 RX ADMIN — SODIUM CHLORIDE 1000 ML: 9 INJECTION, SOLUTION INTRAVENOUS at 02:20

## 2018-01-01 RX ADMIN — INSULIN GLARGINE 50 UNITS: 100 INJECTION, SOLUTION SUBCUTANEOUS at 08:43

## 2018-01-01 RX ADMIN — MEXILETINE HYDROCHLORIDE 250 MG: 250 CAPSULE ORAL at 21:39

## 2018-01-01 RX ADMIN — WARFARIN SODIUM 2.5 MG: 2.5 TABLET ORAL at 18:11

## 2018-01-01 RX ADMIN — FERROUS SULFATE TAB 325 MG (65 MG ELEMENTAL FE) 325 MG: 325 (65 FE) TAB at 11:40

## 2018-01-01 RX ADMIN — INSULIN GLARGINE 20 UNITS: 100 INJECTION, SOLUTION SUBCUTANEOUS at 21:48

## 2018-01-01 RX ADMIN — SODIUM CHLORIDE 1000 ML: 9 INJECTION, SOLUTION INTRAVENOUS at 16:33

## 2018-01-01 RX ADMIN — SODIUM CHLORIDE: 9 INJECTION, SOLUTION INTRAVENOUS at 07:42

## 2018-01-01 RX ADMIN — ISOSORBIDE MONONITRATE 60 MG: 60 TABLET, EXTENDED RELEASE ORAL at 20:21

## 2018-01-01 RX ADMIN — HYDRALAZINE HYDROCHLORIDE 25 MG: 25 TABLET ORAL at 08:18

## 2018-01-01 RX ADMIN — PIPERACILLIN SODIUM AND TAZOBACTAM SODIUM 3.38 G: 3; .375 INJECTION, POWDER, LYOPHILIZED, FOR SOLUTION INTRAVENOUS at 21:15

## 2018-01-01 RX ADMIN — Medication 10 ML: at 00:41

## 2018-01-01 RX ADMIN — INSULIN GLARGINE 50 UNITS: 100 INJECTION, SOLUTION SUBCUTANEOUS at 08:19

## 2018-01-01 RX ADMIN — POTASSIUM CHLORIDE 20 MEQ: 20 TABLET, EXTENDED RELEASE ORAL at 23:36

## 2018-01-01 RX ADMIN — Medication 10 ML: at 21:16

## 2018-01-01 RX ADMIN — HYDRALAZINE HYDROCHLORIDE 25 MG: 25 TABLET, FILM COATED ORAL at 15:21

## 2018-01-01 RX ADMIN — METOPROLOL SUCCINATE 200 MG: 100 TABLET, FILM COATED, EXTENDED RELEASE ORAL at 21:10

## 2018-01-01 RX ADMIN — HYDRALAZINE HYDROCHLORIDE 25 MG: 25 TABLET, FILM COATED ORAL at 22:43

## 2018-01-01 RX ADMIN — BUMETANIDE 1 MG: 0.25 INJECTION INTRAMUSCULAR; INTRAVENOUS at 08:59

## 2018-01-01 RX ADMIN — HYDRALAZINE HYDROCHLORIDE 25 MG: 25 TABLET, FILM COATED ORAL at 08:50

## 2018-01-01 RX ADMIN — OSELTAMIVIR PHOSPHATE 30 MG: 30 CAPSULE ORAL at 08:57

## 2018-01-01 RX ADMIN — MEXILETINE HYDROCHLORIDE 250 MG: 250 CAPSULE ORAL at 13:39

## 2018-01-01 RX ADMIN — ALLOPURINOL 300 MG: 300 TABLET ORAL at 12:18

## 2018-01-01 RX ADMIN — MEXILETINE HYDROCHLORIDE 250 MG: 250 CAPSULE ORAL at 08:57

## 2018-01-01 RX ADMIN — INSULIN LISPRO 2 UNITS: 100 INJECTION, SOLUTION INTRAVENOUS; SUBCUTANEOUS at 08:39

## 2018-01-01 RX ADMIN — HYDRALAZINE HYDROCHLORIDE 25 MG: 25 TABLET ORAL at 20:14

## 2018-01-01 RX ADMIN — VITAMIN D, TAB 1000IU (100/BT) 1000 UNITS: 25 TAB at 12:53

## 2018-01-01 RX ADMIN — ALLOPURINOL 300 MG: 300 TABLET ORAL at 12:27

## 2018-01-01 RX ADMIN — METOPROLOL SUCCINATE 200 MG: 100 TABLET, EXTENDED RELEASE ORAL at 21:00

## 2018-01-01 RX ADMIN — DEXTROSE MONOHYDRATE 12.5 G: 25 INJECTION, SOLUTION INTRAVENOUS at 22:14

## 2018-01-01 RX ADMIN — Medication 10 ML: at 21:04

## 2018-01-01 RX ADMIN — MAGNESIUM HYDROXIDE 30 ML: 400 SUSPENSION ORAL at 10:46

## 2018-01-01 RX ADMIN — Medication 10 ML: at 19:25

## 2018-01-01 RX ADMIN — INSULIN GLARGINE 50 UNITS: 100 INJECTION, SOLUTION SUBCUTANEOUS at 10:10

## 2018-01-01 RX ADMIN — MORPHINE SULFATE 2 MG: 2 INJECTION, SOLUTION INTRAMUSCULAR; INTRAVENOUS at 23:29

## 2018-01-01 RX ADMIN — MAGNESIUM SULFATE HEPTAHYDRATE 1 G: 1 INJECTION, SOLUTION INTRAVENOUS at 16:39

## 2018-01-01 RX ADMIN — POTASSIUM CHLORIDE 20 MEQ: 20 TABLET, EXTENDED RELEASE ORAL at 09:27

## 2018-01-01 RX ADMIN — BUMETANIDE 2 MG: 1 TABLET ORAL at 23:34

## 2018-01-01 RX ADMIN — BUMETANIDE 2 MG: 1 TABLET ORAL at 09:14

## 2018-01-01 RX ADMIN — ISOSORBIDE MONONITRATE 60 MG: 60 TABLET ORAL at 22:00

## 2018-01-01 RX ADMIN — Medication 10 ML: at 10:41

## 2018-01-01 RX ADMIN — INSULIN GLARGINE 50 UNITS: 100 INJECTION, SOLUTION SUBCUTANEOUS at 20:26

## 2018-01-01 RX ADMIN — CEFTRIAXONE SODIUM 1 G: 1 INJECTION, POWDER, FOR SOLUTION INTRAMUSCULAR; INTRAVENOUS at 11:25

## 2018-01-01 RX ADMIN — INSULIN GLARGINE 40 UNITS: 100 INJECTION, SOLUTION SUBCUTANEOUS at 21:18

## 2018-01-01 RX ADMIN — ALLOPURINOL 300 MG: 300 TABLET ORAL at 11:55

## 2018-01-01 RX ADMIN — MIRTAZAPINE 30 MG: 15 TABLET, FILM COATED ORAL at 20:48

## 2018-01-01 RX ADMIN — CHOLECALCIFEROL TAB 25 MCG (1000 UNIT) 1000 UNITS: 25 TAB at 18:32

## 2018-01-01 RX ADMIN — FERROUS SULFATE TAB 325 MG (65 MG ELEMENTAL FE) 325 MG: 325 (65 FE) TAB at 11:53

## 2018-01-01 RX ADMIN — ALLOPURINOL 300 MG: 300 TABLET ORAL at 12:05

## 2018-01-01 RX ADMIN — MEXILETINE HYDROCHLORIDE 250 MG: 250 CAPSULE ORAL at 21:35

## 2018-01-01 RX ADMIN — PETROLATUM: 42 OINTMENT TOPICAL at 21:02

## 2018-01-01 RX ADMIN — DEXTROSE 15 G: 15 GEL ORAL at 06:36

## 2018-01-01 RX ADMIN — PETROLATUM: 42 OINTMENT TOPICAL at 09:12

## 2018-01-01 RX ADMIN — ENOXAPARIN SODIUM 40 MG: 40 INJECTION SUBCUTANEOUS at 18:30

## 2018-01-01 RX ADMIN — INSULIN GLARGINE 50 UNITS: 100 INJECTION, SOLUTION SUBCUTANEOUS at 21:09

## 2018-01-01 RX ADMIN — PETROLATUM: 42 OINTMENT TOPICAL at 21:17

## 2018-01-01 RX ADMIN — INSULIN LISPRO 4 UNITS: 100 INJECTION, SOLUTION INTRAVENOUS; SUBCUTANEOUS at 18:04

## 2018-01-01 RX ADMIN — POTASSIUM CHLORIDE 20 MEQ: 20 TABLET, EXTENDED RELEASE ORAL at 08:30

## 2018-01-01 RX ADMIN — CEFTRIAXONE 1 G: 1 INJECTION, POWDER, FOR SOLUTION INTRAMUSCULAR; INTRAVENOUS at 10:21

## 2018-01-01 RX ADMIN — INSULIN LISPRO 3 UNITS: 100 INJECTION, SOLUTION INTRAVENOUS; SUBCUTANEOUS at 21:14

## 2018-01-01 RX ADMIN — ISOSORBIDE MONONITRATE 60 MG: 60 TABLET, EXTENDED RELEASE ORAL at 10:06

## 2018-01-01 RX ADMIN — INSULIN LISPRO 1 UNITS: 100 INJECTION, SOLUTION INTRAVENOUS; SUBCUTANEOUS at 12:40

## 2018-01-01 RX ADMIN — MEXILETINE HYDROCHLORIDE 250 MG: 250 CAPSULE ORAL at 21:00

## 2018-01-01 RX ADMIN — MEXILETINE HYDROCHLORIDE 250 MG: 250 CAPSULE ORAL at 09:01

## 2018-01-01 RX ADMIN — HYDROCODONE BITARTRATE AND ACETAMINOPHEN 1 TABLET: 5; 325 TABLET ORAL at 15:04

## 2018-01-01 RX ADMIN — MEXILETINE HYDROCHLORIDE 250 MG: 250 CAPSULE ORAL at 13:30

## 2018-01-01 RX ADMIN — METOPROLOL SUCCINATE 50 MG: 50 TABLET, EXTENDED RELEASE ORAL at 08:59

## 2018-01-01 RX ADMIN — DEXTROSE MONOHYDRATE 12.5 G: 25 INJECTION, SOLUTION INTRAVENOUS at 05:30

## 2018-01-01 RX ADMIN — MEXILETINE HYDROCHLORIDE 200 MG: 200 CAPSULE ORAL at 14:07

## 2018-01-01 RX ADMIN — WARFARIN SODIUM 3 MG: 3 TABLET ORAL at 21:31

## 2018-01-01 RX ADMIN — MEXILETINE HYDROCHLORIDE 200 MG: 200 CAPSULE ORAL at 14:00

## 2018-01-01 RX ADMIN — CEFTRIAXONE SODIUM 1 G: 1 INJECTION, POWDER, FOR SOLUTION INTRAMUSCULAR; INTRAVENOUS at 18:06

## 2018-01-01 RX ADMIN — FUROSEMIDE 40 MG: 10 INJECTION, SOLUTION INTRAMUSCULAR; INTRAVENOUS at 09:12

## 2018-01-01 RX ADMIN — METOPROLOL SUCCINATE 200 MG: 100 TABLET, FILM COATED, EXTENDED RELEASE ORAL at 08:30

## 2018-01-01 RX ADMIN — MEXILETINE HYDROCHLORIDE 250 MG: 250 CAPSULE ORAL at 23:34

## 2018-01-01 RX ADMIN — IPRATROPIUM BROMIDE AND ALBUTEROL SULFATE 1 AMPULE: .5; 3 SOLUTION RESPIRATORY (INHALATION) at 19:50

## 2018-01-01 RX ADMIN — DEXTROSE MONOHYDRATE 25 G: 25 INJECTION, SOLUTION INTRAVENOUS at 19:13

## 2018-01-01 RX ADMIN — PANTOPRAZOLE SODIUM 40 MG: 40 TABLET, DELAYED RELEASE ORAL at 05:38

## 2018-01-01 RX ADMIN — METOPROLOL SUCCINATE 200 MG: 100 TABLET, EXTENDED RELEASE ORAL at 08:19

## 2018-01-01 RX ADMIN — ISOSORBIDE MONONITRATE 60 MG: 60 TABLET, EXTENDED RELEASE ORAL at 21:21

## 2018-01-01 RX ADMIN — INSULIN LISPRO 4 UNITS: 100 INJECTION, SOLUTION INTRAVENOUS; SUBCUTANEOUS at 11:17

## 2018-01-01 RX ADMIN — INSULIN LISPRO 6 UNITS: 100 INJECTION, SOLUTION INTRAVENOUS; SUBCUTANEOUS at 11:46

## 2018-01-01 RX ADMIN — ISOSORBIDE MONONITRATE 60 MG: 60 TABLET, EXTENDED RELEASE ORAL at 22:07

## 2018-01-01 RX ADMIN — Medication 10 ML: at 21:55

## 2018-01-01 RX ADMIN — Medication 10 ML: at 01:23

## 2018-01-01 RX ADMIN — VANCOMYCIN HYDROCHLORIDE 1750 MG: 1 INJECTION, POWDER, LYOPHILIZED, FOR SOLUTION INTRAVENOUS at 17:36

## 2018-01-01 RX ADMIN — HYDRALAZINE HYDROCHLORIDE 25 MG: 25 TABLET, FILM COATED ORAL at 05:56

## 2018-01-01 RX ADMIN — PETROLATUM: 42 OINTMENT TOPICAL at 10:51

## 2018-01-01 RX ADMIN — INSULIN LISPRO 3 UNITS: 100 INJECTION, SOLUTION INTRAVENOUS; SUBCUTANEOUS at 21:29

## 2018-01-01 RX ADMIN — Medication 10 ML: at 08:54

## 2018-01-01 RX ADMIN — MORPHINE SULFATE 2 MG: 2 INJECTION, SOLUTION INTRAMUSCULAR; INTRAVENOUS at 12:13

## 2018-01-01 RX ADMIN — INSULIN LISPRO 8 UNITS: 100 INJECTION, SOLUTION INTRAVENOUS; SUBCUTANEOUS at 11:28

## 2018-01-01 RX ADMIN — METOPROLOL SUCCINATE 200 MG: 100 TABLET, EXTENDED RELEASE ORAL at 08:36

## 2018-01-01 RX ADMIN — MEXILETINE HYDROCHLORIDE 250 MG: 250 CAPSULE ORAL at 13:51

## 2018-01-01 RX ADMIN — METOPROLOL SUCCINATE 200 MG: 100 TABLET, EXTENDED RELEASE ORAL at 08:31

## 2018-01-01 RX ADMIN — ALLOPURINOL 300 MG: 300 TABLET ORAL at 12:15

## 2018-01-01 RX ADMIN — INSULIN LISPRO 2 UNITS: 100 INJECTION, SOLUTION INTRAVENOUS; SUBCUTANEOUS at 09:32

## 2018-01-01 RX ADMIN — DEXTROSE MONOHYDRATE 25 G: 25 INJECTION, SOLUTION INTRAVENOUS at 03:30

## 2018-01-01 RX ADMIN — SODIUM CHLORIDE: 9 INJECTION, SOLUTION INTRAVENOUS at 19:03

## 2018-01-01 RX ADMIN — INSULIN LISPRO 3 UNITS: 100 INJECTION, SOLUTION INTRAVENOUS; SUBCUTANEOUS at 21:25

## 2018-01-01 RX ADMIN — HYDRALAZINE HYDROCHLORIDE 25 MG: 25 TABLET, FILM COATED ORAL at 13:34

## 2018-01-01 RX ADMIN — DEXTROSE MONOHYDRATE 12.5 G: 25 INJECTION, SOLUTION INTRAVENOUS at 21:42

## 2018-01-01 RX ADMIN — MIRTAZAPINE 30 MG: 15 TABLET, FILM COATED ORAL at 20:39

## 2018-01-01 RX ADMIN — VITAMIN D, TAB 1000IU (100/BT) 1000 UNITS: 25 TAB at 08:43

## 2018-01-01 RX ADMIN — INSULIN LISPRO 2 UNITS: 100 INJECTION, SOLUTION INTRAVENOUS; SUBCUTANEOUS at 08:16

## 2018-01-01 RX ADMIN — PETROLATUM: 42 OINTMENT TOPICAL at 12:30

## 2018-01-01 RX ADMIN — ALLOPURINOL 300 MG: 300 TABLET ORAL at 12:00

## 2018-01-01 RX ADMIN — BUMETANIDE 2 MG: 1 TABLET ORAL at 21:15

## 2018-01-01 RX ADMIN — MIRTAZAPINE 30 MG: 15 TABLET, FILM COATED ORAL at 21:35

## 2018-01-01 RX ADMIN — BUMETANIDE 2 MG: 1 TABLET ORAL at 16:50

## 2018-01-01 RX ADMIN — MEXILETINE HYDROCHLORIDE 250 MG: 250 CAPSULE ORAL at 05:43

## 2018-01-01 RX ADMIN — CEFAZOLIN SODIUM 1 G: 1 SOLUTION INTRAVENOUS at 03:56

## 2018-01-01 RX ADMIN — PIPERACILLIN SODIUM AND TAZOBACTAM SODIUM 3.38 G: 3; .375 INJECTION, POWDER, LYOPHILIZED, FOR SOLUTION INTRAVENOUS at 16:12

## 2018-01-01 RX ADMIN — POTASSIUM CHLORIDE 20 MEQ: 1500 TABLET, EXTENDED RELEASE ORAL at 09:03

## 2018-01-01 RX ADMIN — MUPIROCIN: 20 OINTMENT TOPICAL at 09:31

## 2018-01-01 RX ADMIN — INSULIN GLARGINE 20 UNITS: 100 INJECTION, SOLUTION SUBCUTANEOUS at 21:00

## 2018-01-01 RX ADMIN — HYDRALAZINE HYDROCHLORIDE 25 MG: 25 TABLET, FILM COATED ORAL at 22:30

## 2018-01-01 RX ADMIN — INSULIN GLARGINE 50 UNITS: 100 INJECTION, SOLUTION SUBCUTANEOUS at 22:14

## 2018-01-01 RX ADMIN — HYDRALAZINE HYDROCHLORIDE 25 MG: 25 TABLET, FILM COATED ORAL at 13:31

## 2018-01-01 RX ADMIN — METOPROLOL SUCCINATE 200 MG: 100 TABLET, FILM COATED, EXTENDED RELEASE ORAL at 09:27

## 2018-01-01 RX ADMIN — Medication 10 ML: at 08:33

## 2018-01-01 RX ADMIN — MEXILETINE HYDROCHLORIDE 250 MG: 250 CAPSULE ORAL at 13:57

## 2018-01-01 RX ADMIN — ASPIRIN 81 MG 81 MG: 81 TABLET ORAL at 08:58

## 2018-01-01 RX ADMIN — SENNOSIDES 17.2 MG: 8.6 TABLET, FILM COATED ORAL at 21:29

## 2018-01-01 RX ADMIN — ISOSORBIDE MONONITRATE 60 MG: 60 TABLET, EXTENDED RELEASE ORAL at 21:48

## 2018-01-01 RX ADMIN — BUMETANIDE 2 MG: 1 TABLET ORAL at 08:34

## 2018-01-01 RX ADMIN — Medication 10 ML: at 21:48

## 2018-01-01 RX ADMIN — LIDOCAINE HYDROCHLORIDE: 20 JELLY TOPICAL at 08:48

## 2018-01-01 RX ADMIN — CEFAZOLIN SODIUM 1 G: 1 SOLUTION INTRAVENOUS at 12:12

## 2018-01-01 RX ADMIN — SODIUM CHLORIDE: 9 INJECTION, SOLUTION INTRAVENOUS at 21:03

## 2018-01-01 RX ADMIN — MEXILETINE HYDROCHLORIDE 250 MG: 250 CAPSULE ORAL at 21:04

## 2018-01-01 RX ADMIN — INSULIN GLARGINE 50 UNITS: 100 INJECTION, SOLUTION SUBCUTANEOUS at 21:42

## 2018-01-01 RX ADMIN — MIRTAZAPINE 30 MG: 15 TABLET, FILM COATED ORAL at 21:15

## 2018-01-01 RX ADMIN — ENOXAPARIN SODIUM 40 MG: 40 INJECTION SUBCUTANEOUS at 17:57

## 2018-01-01 RX ADMIN — MIRTAZAPINE 30 MG: 15 TABLET, FILM COATED ORAL at 21:16

## 2018-01-01 RX ADMIN — METOPROLOL SUCCINATE 200 MG: 100 TABLET, EXTENDED RELEASE ORAL at 22:00

## 2018-01-01 RX ADMIN — ISOSORBIDE MONONITRATE 60 MG: 60 TABLET, EXTENDED RELEASE ORAL at 21:23

## 2018-01-01 RX ADMIN — ENOXAPARIN SODIUM 40 MG: 40 INJECTION SUBCUTANEOUS at 18:03

## 2018-01-01 RX ADMIN — INSULIN LISPRO 2 UNITS: 100 INJECTION, SOLUTION INTRAVENOUS; SUBCUTANEOUS at 23:35

## 2018-01-01 RX ADMIN — Medication 10 ML: at 17:15

## 2018-01-01 RX ADMIN — ISOSORBIDE MONONITRATE 60 MG: 60 TABLET ORAL at 08:51

## 2018-01-01 RX ADMIN — HYDRALAZINE HYDROCHLORIDE 25 MG: 25 TABLET, FILM COATED ORAL at 06:16

## 2018-01-01 RX ADMIN — HYDRALAZINE HYDROCHLORIDE 25 MG: 25 TABLET, FILM COATED ORAL at 22:39

## 2018-01-01 RX ADMIN — ISOSORBIDE MONONITRATE 60 MG: 60 TABLET, EXTENDED RELEASE ORAL at 21:09

## 2018-01-01 RX ADMIN — INSULIN LISPRO 3 UNITS: 100 INJECTION, SOLUTION INTRAVENOUS; SUBCUTANEOUS at 12:45

## 2018-01-01 RX ADMIN — MEXILETINE HYDROCHLORIDE 250 MG: 250 CAPSULE ORAL at 21:15

## 2018-01-01 RX ADMIN — POTASSIUM CHLORIDE 20 MEQ: 20 TABLET, EXTENDED RELEASE ORAL at 21:02

## 2018-01-01 RX ADMIN — MEXILETINE HYDROCHLORIDE 250 MG: 250 CAPSULE ORAL at 06:18

## 2018-01-01 RX ADMIN — AMOXICILLIN AND CLAVULANATE POTASSIUM 1 TABLET: 875; 125 TABLET, FILM COATED ORAL at 08:30

## 2018-01-01 RX ADMIN — CHOLECALCIFEROL TAB 25 MCG (1000 UNIT) 1000 UNITS: 25 TAB at 11:41

## 2018-01-01 RX ADMIN — MEXILETINE HYDROCHLORIDE 250 MG: 250 CAPSULE ORAL at 14:34

## 2018-01-01 RX ADMIN — Medication 10 ML: at 08:56

## 2018-01-01 RX ADMIN — MEXILETINE HYDROCHLORIDE 250 MG: 250 CAPSULE ORAL at 21:21

## 2018-01-01 RX ADMIN — INSULIN LISPRO 1 UNITS: 100 INJECTION, SOLUTION INTRAVENOUS; SUBCUTANEOUS at 21:22

## 2018-01-01 RX ADMIN — FUROSEMIDE 40 MG: 10 INJECTION, SOLUTION INTRAMUSCULAR; INTRAVENOUS at 15:04

## 2018-01-01 RX ADMIN — METOPROLOL SUCCINATE 200 MG: 100 TABLET, EXTENDED RELEASE ORAL at 21:09

## 2018-01-01 RX ADMIN — ALLOPURINOL 300 MG: 300 TABLET ORAL at 12:53

## 2018-01-01 RX ADMIN — MEXILETINE HYDROCHLORIDE 250 MG: 250 CAPSULE ORAL at 00:32

## 2018-01-01 RX ADMIN — INSULIN LISPRO 1 UNITS: 100 INJECTION, SOLUTION INTRAVENOUS; SUBCUTANEOUS at 21:29

## 2018-01-01 RX ADMIN — INSULIN LISPRO 4 UNITS: 100 INJECTION, SOLUTION INTRAVENOUS; SUBCUTANEOUS at 16:25

## 2018-01-01 RX ADMIN — HYDRALAZINE HYDROCHLORIDE 25 MG: 25 TABLET, FILM COATED ORAL at 06:08

## 2018-01-01 RX ADMIN — VITAMIN D, TAB 1000IU (100/BT) 1000 UNITS: 25 TAB at 12:56

## 2018-01-01 RX ADMIN — FERROUS SULFATE TAB 325 MG (65 MG ELEMENTAL FE) 325 MG: 325 (65 FE) TAB at 09:06

## 2018-01-01 RX ADMIN — PIPERACILLIN SODIUM AND TAZOBACTAM SODIUM 3.38 G: 3; .375 INJECTION, POWDER, LYOPHILIZED, FOR SOLUTION INTRAVENOUS at 12:45

## 2018-01-01 RX ADMIN — MEXILETINE HYDROCHLORIDE 250 MG: 250 CAPSULE ORAL at 13:33

## 2018-01-01 RX ADMIN — METOPROLOL SUCCINATE 50 MG: 50 TABLET, EXTENDED RELEASE ORAL at 20:42

## 2018-01-01 RX ADMIN — METOPROLOL SUCCINATE 200 MG: 100 TABLET, FILM COATED, EXTENDED RELEASE ORAL at 08:58

## 2018-01-01 RX ADMIN — PHYTONADIONE 5 MG: 10 INJECTION, EMULSION INTRAMUSCULAR; INTRAVENOUS; SUBCUTANEOUS at 05:18

## 2018-01-01 RX ADMIN — INSULIN LISPRO 3 UNITS: 100 INJECTION, SOLUTION INTRAVENOUS; SUBCUTANEOUS at 17:12

## 2018-01-01 RX ADMIN — MEXILETINE HYDROCHLORIDE 250 MG: 250 CAPSULE ORAL at 13:22

## 2018-01-01 RX ADMIN — LORAZEPAM 0.5 MG: 0.5 TABLET ORAL at 15:34

## 2018-01-01 RX ADMIN — Medication 10 ML: at 07:05

## 2018-01-01 RX ADMIN — HYDRALAZINE HYDROCHLORIDE 25 MG: 25 TABLET, FILM COATED ORAL at 06:36

## 2018-01-01 RX ADMIN — INSULIN LISPRO 2 UNITS: 100 INJECTION, SOLUTION INTRAVENOUS; SUBCUTANEOUS at 21:51

## 2018-01-01 RX ADMIN — PETROLATUM: 42 OINTMENT TOPICAL at 21:22

## 2018-01-01 RX ADMIN — MEXILETINE HYDROCHLORIDE 250 MG: 250 CAPSULE ORAL at 05:58

## 2018-01-01 RX ADMIN — INSULIN LISPRO 8 UNITS: 100 INJECTION, SOLUTION INTRAVENOUS; SUBCUTANEOUS at 09:06

## 2018-01-01 RX ADMIN — PETROLATUM: 42 OINTMENT TOPICAL at 14:11

## 2018-01-01 RX ADMIN — INSULIN GLARGINE 50 UNITS: 100 INJECTION, SOLUTION SUBCUTANEOUS at 10:42

## 2018-01-01 RX ADMIN — INSULIN LISPRO 5 UNITS: 100 INJECTION, SOLUTION INTRAVENOUS; SUBCUTANEOUS at 18:06

## 2018-01-01 RX ADMIN — ISOSORBIDE MONONITRATE 60 MG: 60 TABLET, EXTENDED RELEASE ORAL at 21:00

## 2018-01-01 RX ADMIN — METOPROLOL SUCCINATE 200 MG: 100 TABLET, EXTENDED RELEASE ORAL at 21:01

## 2018-01-01 RX ADMIN — METOPROLOL SUCCINATE 200 MG: 100 TABLET, EXTENDED RELEASE ORAL at 00:39

## 2018-01-01 RX ADMIN — HYDRALAZINE HYDROCHLORIDE 25 MG: 25 TABLET, FILM COATED ORAL at 15:13

## 2018-01-01 RX ADMIN — Medication 10 ML: at 09:26

## 2018-01-01 RX ADMIN — Medication 15 G: at 20:55

## 2018-01-01 RX ADMIN — Medication 10 ML: at 21:46

## 2018-01-01 RX ADMIN — MORPHINE SULFATE 1 MG: 2 INJECTION, SOLUTION INTRAMUSCULAR; INTRAVENOUS at 18:11

## 2018-01-01 RX ADMIN — INSULIN LISPRO 1 UNITS: 100 INJECTION, SOLUTION INTRAVENOUS; SUBCUTANEOUS at 21:49

## 2018-01-01 RX ADMIN — POTASSIUM CHLORIDE 20 MEQ: 20 TABLET, EXTENDED RELEASE ORAL at 20:28

## 2018-01-01 RX ADMIN — ALLOPURINOL 300 MG: 300 TABLET ORAL at 12:32

## 2018-01-01 RX ADMIN — Medication 15 G: at 06:40

## 2018-01-01 RX ADMIN — INSULIN GLARGINE 50 UNITS: 100 INJECTION, SOLUTION SUBCUTANEOUS at 09:02

## 2018-01-01 RX ADMIN — MORPHINE SULFATE 1 MG: 2 INJECTION, SOLUTION INTRAMUSCULAR; INTRAVENOUS at 21:29

## 2018-01-01 RX ADMIN — METOPROLOL SUCCINATE 200 MG: 100 TABLET, EXTENDED RELEASE ORAL at 08:29

## 2018-01-01 RX ADMIN — AMOXICILLIN AND CLAVULANATE POTASSIUM 1 TABLET: 875; 125 TABLET, FILM COATED ORAL at 21:29

## 2018-01-01 RX ADMIN — ISOSORBIDE MONONITRATE 60 MG: 60 TABLET, EXTENDED RELEASE ORAL at 08:55

## 2018-01-01 RX ADMIN — METOPROLOL SUCCINATE 200 MG: 100 TABLET, EXTENDED RELEASE ORAL at 22:07

## 2018-01-01 RX ADMIN — METOPROLOL SUCCINATE 50 MG: 50 TABLET, EXTENDED RELEASE ORAL at 20:08

## 2018-01-01 RX ADMIN — INSULIN LISPRO 4 UNITS: 100 INJECTION, SOLUTION INTRAVENOUS; SUBCUTANEOUS at 08:27

## 2018-01-01 RX ADMIN — MORPHINE SULFATE 1 MG: 2 INJECTION, SOLUTION INTRAMUSCULAR; INTRAVENOUS at 02:28

## 2018-01-01 RX ADMIN — ISOSORBIDE MONONITRATE 120 MG: 60 TABLET ORAL at 09:27

## 2018-01-01 RX ADMIN — HYDRALAZINE HYDROCHLORIDE 10 MG: 10 TABLET, FILM COATED ORAL at 21:10

## 2018-01-01 RX ADMIN — HYDRALAZINE HYDROCHLORIDE 25 MG: 25 TABLET, FILM COATED ORAL at 21:10

## 2018-01-01 RX ADMIN — MEXILETINE HYDROCHLORIDE 250 MG: 250 CAPSULE ORAL at 16:12

## 2018-01-01 RX ADMIN — PANTOPRAZOLE SODIUM 40 MG: 40 TABLET, DELAYED RELEASE ORAL at 06:58

## 2018-01-01 RX ADMIN — METOPROLOL SUCCINATE 50 MG: 50 TABLET, EXTENDED RELEASE ORAL at 21:29

## 2018-01-01 RX ADMIN — METOPROLOL SUCCINATE 200 MG: 100 TABLET, EXTENDED RELEASE ORAL at 20:37

## 2018-01-01 RX ADMIN — MEXILETINE HYDROCHLORIDE 250 MG: 250 CAPSULE ORAL at 13:45

## 2018-01-01 RX ADMIN — BUMETANIDE 2 MG: 1 TABLET ORAL at 21:59

## 2018-01-01 RX ADMIN — INSULIN GLARGINE 25 UNITS: 100 INJECTION, SOLUTION SUBCUTANEOUS at 08:33

## 2018-01-01 RX ADMIN — MIRTAZAPINE 30 MG: 15 TABLET, FILM COATED ORAL at 21:04

## 2018-01-01 RX ADMIN — MEXILETINE HYDROCHLORIDE 200 MG: 200 CAPSULE ORAL at 20:29

## 2018-01-01 RX ADMIN — Medication 10 ML: at 21:02

## 2018-01-01 RX ADMIN — Medication 10 ML: at 20:33

## 2018-01-01 RX ADMIN — METOPROLOL SUCCINATE 200 MG: 100 TABLET, FILM COATED, EXTENDED RELEASE ORAL at 08:23

## 2018-01-01 RX ADMIN — POTASSIUM CHLORIDE 20 MEQ: 1500 TABLET, EXTENDED RELEASE ORAL at 08:49

## 2018-01-01 RX ADMIN — ALLOPURINOL 300 MG: 300 TABLET ORAL at 12:57

## 2018-01-01 RX ADMIN — MEXILETINE HYDROCHLORIDE 250 MG: 250 CAPSULE ORAL at 15:30

## 2018-01-01 RX ADMIN — MEXILETINE HYDROCHLORIDE 250 MG: 250 CAPSULE ORAL at 21:54

## 2018-01-01 RX ADMIN — VITAMIN D, TAB 1000IU (100/BT) 1000 UNITS: 25 TAB at 11:40

## 2018-01-01 RX ADMIN — IPRATROPIUM BROMIDE AND ALBUTEROL SULFATE 1 AMPULE: .5; 3 SOLUTION RESPIRATORY (INHALATION) at 07:28

## 2018-01-01 RX ADMIN — HYDRALAZINE HYDROCHLORIDE 25 MG: 25 TABLET, FILM COATED ORAL at 21:22

## 2018-01-01 RX ADMIN — Medication 10 ML: at 20:25

## 2018-01-01 RX ADMIN — MEXILETINE HYDROCHLORIDE 250 MG: 250 CAPSULE ORAL at 09:21

## 2018-01-01 RX ADMIN — PETROLATUM: 42 OINTMENT TOPICAL at 20:38

## 2018-01-01 RX ADMIN — ISOSORBIDE MONONITRATE 60 MG: 60 TABLET ORAL at 21:55

## 2018-01-01 RX ADMIN — MEXILETINE HYDROCHLORIDE 250 MG: 250 CAPSULE ORAL at 15:13

## 2018-01-01 RX ADMIN — SODIUM CHLORIDE 500 ML: 9 INJECTION, SOLUTION INTRAVENOUS at 07:46

## 2018-01-01 RX ADMIN — PIPERACILLIN SODIUM AND TAZOBACTAM SODIUM 3.38 G: 3; .375 INJECTION, POWDER, LYOPHILIZED, FOR SOLUTION INTRAVENOUS at 12:57

## 2018-01-01 RX ADMIN — MEXILETINE HYDROCHLORIDE 250 MG: 250 CAPSULE ORAL at 06:26

## 2018-01-01 RX ADMIN — MEXILETINE HYDROCHLORIDE 250 MG: 250 CAPSULE ORAL at 15:44

## 2018-01-01 RX ADMIN — PIPERACILLIN SODIUM AND TAZOBACTAM SODIUM 3.38 G: 3; .375 INJECTION, POWDER, LYOPHILIZED, FOR SOLUTION INTRAVENOUS at 02:28

## 2018-01-01 RX ADMIN — HYDRALAZINE HYDROCHLORIDE 25 MG: 25 TABLET, FILM COATED ORAL at 06:27

## 2018-01-01 RX ADMIN — INSULIN LISPRO 6 UNITS: 100 INJECTION, SOLUTION INTRAVENOUS; SUBCUTANEOUS at 17:25

## 2018-01-01 RX ADMIN — MIRTAZAPINE 30 MG: 15 TABLET, FILM COATED ORAL at 21:08

## 2018-01-01 RX ADMIN — PIPERACILLIN SODIUM AND TAZOBACTAM SODIUM 3.38 G: 3; .375 INJECTION, POWDER, LYOPHILIZED, FOR SOLUTION INTRAVENOUS at 05:06

## 2018-01-01 RX ADMIN — PIPERACILLIN SODIUM AND TAZOBACTAM SODIUM 3.38 G: 3; .375 INJECTION, POWDER, LYOPHILIZED, FOR SOLUTION INTRAVENOUS at 05:13

## 2018-01-01 RX ADMIN — METOPROLOL SUCCINATE 50 MG: 50 TABLET, EXTENDED RELEASE ORAL at 21:01

## 2018-01-01 RX ADMIN — VITAMIN D, TAB 1000IU (100/BT) 1000 UNITS: 25 TAB at 10:10

## 2018-01-01 RX ADMIN — WARFARIN SODIUM 3 MG: 3 TABLET ORAL at 18:03

## 2018-01-01 RX ADMIN — METOPROLOL SUCCINATE 200 MG: 100 TABLET, EXTENDED RELEASE ORAL at 21:33

## 2018-01-01 RX ADMIN — Medication 2 G: at 09:02

## 2018-01-01 RX ADMIN — HYDRALAZINE HYDROCHLORIDE 25 MG: 25 TABLET, FILM COATED ORAL at 22:35

## 2018-01-01 RX ADMIN — HYDRALAZINE HYDROCHLORIDE 25 MG: 25 TABLET, FILM COATED ORAL at 21:34

## 2018-01-01 RX ADMIN — MIRTAZAPINE 30 MG: 15 TABLET, FILM COATED ORAL at 22:09

## 2018-01-01 RX ADMIN — MUPIROCIN: 20 OINTMENT TOPICAL at 21:41

## 2018-01-01 RX ADMIN — PETROLATUM: 42 OINTMENT TOPICAL at 21:16

## 2018-01-01 RX ADMIN — METOPROLOL SUCCINATE 200 MG: 100 TABLET, EXTENDED RELEASE ORAL at 09:06

## 2018-01-01 RX ADMIN — INSULIN LISPRO 2 UNITS: 100 INJECTION, SOLUTION INTRAVENOUS; SUBCUTANEOUS at 16:56

## 2018-01-01 RX ADMIN — TRAMADOL HYDROCHLORIDE 50 MG: 50 TABLET, FILM COATED ORAL at 00:48

## 2018-01-01 RX ADMIN — PIPERACILLIN SODIUM AND TAZOBACTAM SODIUM 3.38 G: 3; .375 INJECTION, POWDER, LYOPHILIZED, FOR SOLUTION INTRAVENOUS at 16:25

## 2018-01-01 RX ADMIN — FERROUS SULFATE TAB 325 MG (65 MG ELEMENTAL FE) 325 MG: 325 (65 FE) TAB at 11:36

## 2018-01-01 RX ADMIN — MORPHINE SULFATE 2 MG: 2 INJECTION, SOLUTION INTRAMUSCULAR; INTRAVENOUS at 01:23

## 2018-01-01 RX ADMIN — HYDRALAZINE HYDROCHLORIDE 25 MG: 25 TABLET, FILM COATED ORAL at 15:44

## 2018-01-01 RX ADMIN — HYDRALAZINE HYDROCHLORIDE 25 MG: 25 TABLET ORAL at 14:34

## 2018-01-01 RX ADMIN — FLUCONAZOLE 200 MG: 100 TABLET ORAL at 13:47

## 2018-01-01 RX ADMIN — PIPERACILLIN SODIUM AND TAZOBACTAM SODIUM 3.38 G: 3; .375 INJECTION, POWDER, LYOPHILIZED, FOR SOLUTION INTRAVENOUS at 15:38

## 2018-01-01 RX ADMIN — CEFEPIME HYDROCHLORIDE 2 G: 2 INJECTION, POWDER, FOR SOLUTION INTRAVENOUS at 13:47

## 2018-01-01 RX ADMIN — MEXILETINE HYDROCHLORIDE 200 MG: 200 CAPSULE ORAL at 06:19

## 2018-01-01 RX ADMIN — CEFTRIAXONE SODIUM 1 G: 1 INJECTION, POWDER, FOR SOLUTION INTRAMUSCULAR; INTRAVENOUS at 17:50

## 2018-01-01 RX ADMIN — METOPROLOL SUCCINATE 200 MG: 100 TABLET, EXTENDED RELEASE ORAL at 21:22

## 2018-01-01 RX ADMIN — FENTANYL CITRATE 50 MCG: 50 INJECTION, SOLUTION INTRAMUSCULAR; INTRAVENOUS at 09:53

## 2018-01-01 RX ADMIN — CEFAZOLIN SODIUM 1 G: 1 SOLUTION INTRAVENOUS at 20:10

## 2018-01-01 RX ADMIN — INSULIN LISPRO 2 UNITS: 100 INJECTION, SOLUTION INTRAVENOUS; SUBCUTANEOUS at 08:03

## 2018-01-01 RX ADMIN — MIRTAZAPINE 30 MG: 15 TABLET, FILM COATED ORAL at 22:21

## 2018-01-01 RX ADMIN — Medication 10 ML: at 12:45

## 2018-01-01 RX ADMIN — FUROSEMIDE 20 MG: 10 INJECTION, SOLUTION INTRAVENOUS at 10:20

## 2018-01-01 RX ADMIN — MAGNESIUM HYDROXIDE 30 ML: 400 SUSPENSION ORAL at 12:45

## 2018-01-01 RX ADMIN — MORPHINE SULFATE 1 MG: 2 INJECTION, SOLUTION INTRAMUSCULAR; INTRAVENOUS at 18:04

## 2018-01-01 RX ADMIN — FERROUS SULFATE TAB 325 MG (65 MG ELEMENTAL FE) 325 MG: 325 (65 FE) TAB at 08:29

## 2018-01-01 RX ADMIN — VITAMIN D, TAB 1000IU (100/BT) 1000 UNITS: 25 TAB at 12:45

## 2018-01-01 RX ADMIN — Medication 10 ML: at 21:20

## 2018-01-01 RX ADMIN — FLUCONAZOLE 200 MG: 100 TABLET ORAL at 19:05

## 2018-01-01 RX ADMIN — FERROUS SULFATE TAB 325 MG (65 MG ELEMENTAL FE) 325 MG: 325 (65 FE) TAB at 12:15

## 2018-01-01 RX ADMIN — Medication 10 ML: at 08:43

## 2018-01-01 RX ADMIN — INSULIN LISPRO 4 UNITS: 100 INJECTION, SOLUTION INTRAVENOUS; SUBCUTANEOUS at 08:48

## 2018-01-01 RX ADMIN — MEXILETINE HYDROCHLORIDE 200 MG: 200 CAPSULE ORAL at 22:24

## 2018-01-01 RX ADMIN — VITAMIN D, TAB 1000IU (100/BT) 1000 UNITS: 25 TAB at 08:30

## 2018-01-01 RX ADMIN — IPRATROPIUM BROMIDE AND ALBUTEROL SULFATE 1 AMPULE: .5; 3 SOLUTION RESPIRATORY (INHALATION) at 20:22

## 2018-01-01 RX ADMIN — HYDRALAZINE HYDROCHLORIDE 25 MG: 25 TABLET, FILM COATED ORAL at 13:57

## 2018-01-01 RX ADMIN — METOPROLOL SUCCINATE 200 MG: 100 TABLET, EXTENDED RELEASE ORAL at 08:35

## 2018-01-01 RX ADMIN — PANTOPRAZOLE SODIUM 40 MG: 40 TABLET, DELAYED RELEASE ORAL at 08:25

## 2018-01-01 RX ADMIN — POTASSIUM CHLORIDE 20 MEQ: 20 TABLET, EXTENDED RELEASE ORAL at 20:24

## 2018-01-01 RX ADMIN — MEXILETINE HYDROCHLORIDE 250 MG: 250 CAPSULE ORAL at 21:33

## 2018-01-01 RX ADMIN — INSULIN LISPRO 8 UNITS: 100 INJECTION, SOLUTION INTRAVENOUS; SUBCUTANEOUS at 17:23

## 2018-01-01 RX ADMIN — ALLOPURINOL 300 MG: 300 TABLET ORAL at 12:45

## 2018-01-01 RX ADMIN — Medication 10 ML: at 16:36

## 2018-01-01 RX ADMIN — POTASSIUM CHLORIDE 40 MEQ: 20 TABLET, EXTENDED RELEASE ORAL at 08:30

## 2018-01-01 RX ADMIN — MORPHINE SULFATE 1 MG: 2 INJECTION, SOLUTION INTRAMUSCULAR; INTRAVENOUS at 03:00

## 2018-01-01 RX ADMIN — DEXTROSE AND SODIUM CHLORIDE: 5; 450 INJECTION, SOLUTION INTRAVENOUS at 23:45

## 2018-01-01 RX ADMIN — ENOXAPARIN SODIUM 40 MG: 40 INJECTION SUBCUTANEOUS at 08:33

## 2018-01-01 RX ADMIN — TRAZODONE HYDROCHLORIDE 25 MG: 50 TABLET ORAL at 21:02

## 2018-01-01 RX ADMIN — CEFTRIAXONE SODIUM 1 G: 1 INJECTION, POWDER, FOR SOLUTION INTRAMUSCULAR; INTRAVENOUS at 21:17

## 2018-01-01 RX ADMIN — MEXILETINE HYDROCHLORIDE 250 MG: 250 CAPSULE ORAL at 17:57

## 2018-01-01 RX ADMIN — INSULIN LISPRO 3 UNITS: 100 INJECTION, SOLUTION INTRAVENOUS; SUBCUTANEOUS at 12:50

## 2018-01-01 RX ADMIN — Medication 10 ML: at 20:15

## 2018-01-01 RX ADMIN — ACETAMINOPHEN 650 MG: 325 TABLET, FILM COATED ORAL at 12:11

## 2018-01-01 RX ADMIN — ALBUTEROL SULFATE 2.5 MG: 2.5 SOLUTION RESPIRATORY (INHALATION) at 20:30

## 2018-01-01 RX ADMIN — CHOLECALCIFEROL TAB 25 MCG (1000 UNIT) 1000 UNITS: 25 TAB at 12:15

## 2018-01-01 RX ADMIN — POTASSIUM CHLORIDE 20 MEQ: 20 TABLET, EXTENDED RELEASE ORAL at 10:06

## 2018-01-01 RX ADMIN — METOPROLOL SUCCINATE 50 MG: 50 TABLET, EXTENDED RELEASE ORAL at 21:19

## 2018-01-01 RX ADMIN — HYDRALAZINE HYDROCHLORIDE 25 MG: 25 TABLET, FILM COATED ORAL at 21:21

## 2018-01-01 RX ADMIN — ASPIRIN 81 MG 81 MG: 81 TABLET ORAL at 09:26

## 2018-01-01 RX ADMIN — HYDRALAZINE HYDROCHLORIDE 10 MG: 10 TABLET, FILM COATED ORAL at 06:58

## 2018-01-01 RX ADMIN — FERROUS SULFATE TAB 325 MG (65 MG ELEMENTAL FE) 325 MG: 325 (65 FE) TAB at 14:22

## 2018-01-01 RX ADMIN — PETROLATUM: 42 OINTMENT TOPICAL at 09:39

## 2018-01-01 RX ADMIN — HYDRALAZINE HYDROCHLORIDE 25 MG: 25 TABLET, FILM COATED ORAL at 14:18

## 2018-01-01 RX ADMIN — MEXILETINE HYDROCHLORIDE 250 MG: 250 CAPSULE ORAL at 06:43

## 2018-01-01 RX ADMIN — INSULIN LISPRO 2 UNITS: 100 INJECTION, SOLUTION INTRAVENOUS; SUBCUTANEOUS at 20:20

## 2018-01-01 RX ADMIN — PIPERACILLIN SODIUM AND TAZOBACTAM SODIUM 3.38 G: 3; .375 INJECTION, POWDER, LYOPHILIZED, FOR SOLUTION INTRAVENOUS at 22:00

## 2018-01-01 RX ADMIN — MEXILETINE HYDROCHLORIDE 250 MG: 250 CAPSULE ORAL at 10:10

## 2018-01-01 RX ADMIN — CEFTRIAXONE SODIUM 1 G: 1 INJECTION, POWDER, FOR SOLUTION INTRAMUSCULAR; INTRAVENOUS at 17:54

## 2018-01-01 RX ADMIN — HYDRALAZINE HYDROCHLORIDE 25 MG: 25 TABLET, FILM COATED ORAL at 07:06

## 2018-01-01 RX ADMIN — ENOXAPARIN SODIUM 40 MG: 40 INJECTION SUBCUTANEOUS at 08:30

## 2018-01-01 RX ADMIN — ALLOPURINOL 300 MG: 300 TABLET ORAL at 08:58

## 2018-01-01 RX ADMIN — METOPROLOL SUCCINATE 200 MG: 100 TABLET, FILM COATED, EXTENDED RELEASE ORAL at 08:49

## 2018-01-01 RX ADMIN — METOPROLOL SUCCINATE 200 MG: 100 TABLET, EXTENDED RELEASE ORAL at 21:15

## 2018-01-01 RX ADMIN — BUMETANIDE 2 MG: 1 TABLET ORAL at 09:02

## 2018-01-01 RX ADMIN — POTASSIUM CHLORIDE 20 MEQ: 20 TABLET, EXTENDED RELEASE ORAL at 08:57

## 2018-01-01 RX ADMIN — PETROLATUM: 42 OINTMENT TOPICAL at 15:50

## 2018-01-01 RX ADMIN — INSULIN LISPRO 1 UNITS: 100 INJECTION, SOLUTION INTRAVENOUS; SUBCUTANEOUS at 11:53

## 2018-01-01 RX ADMIN — INSULIN LISPRO 4 UNITS: 100 INJECTION, SOLUTION INTRAVENOUS; SUBCUTANEOUS at 08:18

## 2018-01-01 RX ADMIN — IPRATROPIUM BROMIDE AND ALBUTEROL SULFATE 1 AMPULE: .5; 3 SOLUTION RESPIRATORY (INHALATION) at 20:17

## 2018-01-01 RX ADMIN — Medication 10 ML: at 09:14

## 2018-01-01 RX ADMIN — INSULIN LISPRO 2 UNITS: 100 INJECTION, SOLUTION INTRAVENOUS; SUBCUTANEOUS at 08:48

## 2018-01-01 RX ADMIN — FUROSEMIDE 40 MG: 10 INJECTION, SOLUTION INTRAMUSCULAR; INTRAVENOUS at 12:33

## 2018-01-01 RX ADMIN — MEXILETINE HYDROCHLORIDE 250 MG: 250 CAPSULE ORAL at 08:30

## 2018-01-01 RX ADMIN — BUMETANIDE 2 MG: 0.25 INJECTION INTRAMUSCULAR; INTRAVENOUS at 10:47

## 2018-01-01 RX ADMIN — FUROSEMIDE 40 MG: 10 INJECTION, SOLUTION INTRAMUSCULAR; INTRAVENOUS at 08:35

## 2018-01-01 RX ADMIN — INSULIN LISPRO 2 UNITS: 100 INJECTION, SOLUTION INTRAVENOUS; SUBCUTANEOUS at 21:07

## 2018-01-01 RX ADMIN — IPRATROPIUM BROMIDE AND ALBUTEROL SULFATE 1 AMPULE: .5; 3 SOLUTION RESPIRATORY (INHALATION) at 16:30

## 2018-01-01 RX ADMIN — INSULIN LISPRO 2 UNITS: 100 INJECTION, SOLUTION INTRAVENOUS; SUBCUTANEOUS at 21:00

## 2018-01-01 RX ADMIN — POTASSIUM CHLORIDE 20 MEQ: 20 TABLET, EXTENDED RELEASE ORAL at 20:22

## 2018-01-01 RX ADMIN — INSULIN LISPRO 4 UNITS: 100 INJECTION, SOLUTION INTRAVENOUS; SUBCUTANEOUS at 08:12

## 2018-01-01 RX ADMIN — MEXILETINE HYDROCHLORIDE 250 MG: 250 CAPSULE ORAL at 15:02

## 2018-01-01 RX ADMIN — HYDRALAZINE HYDROCHLORIDE 25 MG: 25 TABLET ORAL at 13:51

## 2018-01-01 RX ADMIN — INSULIN LISPRO 4 UNITS: 100 INJECTION, SOLUTION INTRAVENOUS; SUBCUTANEOUS at 06:49

## 2018-01-01 RX ADMIN — OSELTAMIVIR PHOSPHATE 30 MG: 30 CAPSULE ORAL at 12:45

## 2018-01-01 RX ADMIN — HYDRALAZINE HYDROCHLORIDE 25 MG: 25 TABLET, FILM COATED ORAL at 07:02

## 2018-01-01 RX ADMIN — POTASSIUM CHLORIDE 20 MEQ: 20 TABLET, EXTENDED RELEASE ORAL at 21:30

## 2018-01-01 RX ADMIN — ACETAMINOPHEN 650 MG: 325 TABLET ORAL at 06:27

## 2018-01-01 RX ADMIN — POTASSIUM CHLORIDE 20 MEQ: 20 TABLET, EXTENDED RELEASE ORAL at 09:00

## 2018-01-01 RX ADMIN — IPRATROPIUM BROMIDE AND ALBUTEROL SULFATE 1 AMPULE: .5; 3 SOLUTION RESPIRATORY (INHALATION) at 12:09

## 2018-01-01 RX ADMIN — ISOSORBIDE MONONITRATE 60 MG: 60 TABLET, EXTENDED RELEASE ORAL at 20:39

## 2018-01-01 RX ADMIN — ALLOPURINOL 300 MG: 300 TABLET ORAL at 10:09

## 2018-01-01 RX ADMIN — MEXILETINE HYDROCHLORIDE 250 MG: 250 CAPSULE ORAL at 07:02

## 2018-01-01 RX ADMIN — VITAMIN D, TAB 1000IU (100/BT) 1000 UNITS: 25 TAB at 09:00

## 2018-01-01 RX ADMIN — ASPIRIN 81 MG CHEWABLE TABLET 81 MG: 81 TABLET CHEWABLE at 08:29

## 2018-01-01 RX ADMIN — ISOSORBIDE MONONITRATE 60 MG: 60 TABLET, EXTENDED RELEASE ORAL at 21:34

## 2018-01-01 RX ADMIN — PHYTONADIONE 10 MG: 10 INJECTION, EMULSION INTRAMUSCULAR; INTRAVENOUS; SUBCUTANEOUS at 16:18

## 2018-01-01 RX ADMIN — INSULIN LISPRO 4 UNITS: 100 INJECTION, SOLUTION INTRAVENOUS; SUBCUTANEOUS at 12:26

## 2018-01-01 RX ADMIN — MEXILETINE HYDROCHLORIDE 250 MG: 250 CAPSULE ORAL at 05:56

## 2018-01-01 RX ADMIN — METOPROLOL SUCCINATE 200 MG: 100 TABLET, EXTENDED RELEASE ORAL at 10:21

## 2018-01-01 RX ADMIN — IPRATROPIUM BROMIDE AND ALBUTEROL SULFATE 1 AMPULE: .5; 3 SOLUTION RESPIRATORY (INHALATION) at 12:06

## 2018-01-01 RX ADMIN — MEXILETINE HYDROCHLORIDE 250 MG: 250 CAPSULE ORAL at 20:28

## 2018-01-01 RX ADMIN — INSULIN LISPRO 1 UNITS: 100 INJECTION, SOLUTION INTRAVENOUS; SUBCUTANEOUS at 06:11

## 2018-01-01 RX ADMIN — INSULIN LISPRO 3 UNITS: 100 INJECTION, SOLUTION INTRAVENOUS; SUBCUTANEOUS at 12:44

## 2018-01-01 RX ADMIN — MORPHINE SULFATE 1 MG: 2 INJECTION, SOLUTION INTRAMUSCULAR; INTRAVENOUS at 19:24

## 2018-01-01 RX ADMIN — HYDRALAZINE HYDROCHLORIDE 25 MG: 25 TABLET ORAL at 08:58

## 2018-01-01 RX ADMIN — INSULIN GLARGINE 20 UNITS: 100 INJECTION, SOLUTION SUBCUTANEOUS at 21:46

## 2018-01-01 RX ADMIN — INSULIN LISPRO 4 UNITS: 100 INJECTION, SOLUTION INTRAVENOUS; SUBCUTANEOUS at 16:58

## 2018-01-01 RX ADMIN — WARFARIN SODIUM 2.5 MG: 2.5 TABLET ORAL at 17:43

## 2018-01-01 RX ADMIN — BUMETANIDE 2 MG: 0.25 INJECTION INTRAMUSCULAR; INTRAVENOUS at 17:54

## 2018-01-01 RX ADMIN — HYDRALAZINE HYDROCHLORIDE 25 MG: 25 TABLET, FILM COATED ORAL at 13:36

## 2018-01-01 RX ADMIN — MEXILETINE HYDROCHLORIDE 250 MG: 250 CAPSULE ORAL at 08:48

## 2018-01-01 RX ADMIN — VITAMIN D, TAB 1000IU (100/BT) 1000 UNITS: 25 TAB at 12:39

## 2018-01-01 RX ADMIN — ASPIRIN 81 MG 81 MG: 81 TABLET ORAL at 08:19

## 2018-01-01 RX ADMIN — Medication 10 ML: at 10:07

## 2018-01-01 RX ADMIN — METOPROLOL SUCCINATE 50 MG: 50 TABLET, EXTENDED RELEASE ORAL at 09:31

## 2018-01-01 RX ADMIN — POTASSIUM CHLORIDE 20 MEQ: 20 TABLET, EXTENDED RELEASE ORAL at 08:43

## 2018-01-01 RX ADMIN — Medication 10 ML: at 21:25

## 2018-01-01 RX ADMIN — MORPHINE SULFATE 1 MG/HR: 10 INJECTION INTRAVENOUS at 12:16

## 2018-01-01 RX ADMIN — METOPROLOL SUCCINATE 200 MG: 100 TABLET, FILM COATED, EXTENDED RELEASE ORAL at 09:00

## 2018-01-01 RX ADMIN — ASPIRIN 81 MG 81 MG: 81 TABLET ORAL at 20:59

## 2018-01-01 RX ADMIN — PIPERACILLIN SODIUM AND TAZOBACTAM SODIUM 3.38 G: 3; .375 INJECTION, POWDER, LYOPHILIZED, FOR SOLUTION INTRAVENOUS at 22:21

## 2018-01-01 RX ADMIN — MORPHINE SULFATE 1 MG: 2 INJECTION, SOLUTION INTRAMUSCULAR; INTRAVENOUS at 03:14

## 2018-01-01 RX ADMIN — BUMETANIDE 2 MG: 1 TABLET ORAL at 09:27

## 2018-01-01 RX ADMIN — PETROLATUM: 42 OINTMENT TOPICAL at 14:38

## 2018-01-01 RX ADMIN — TRAMADOL HYDROCHLORIDE 50 MG: 50 TABLET, FILM COATED ORAL at 09:37

## 2018-01-01 RX ADMIN — HYDRALAZINE HYDROCHLORIDE 25 MG: 25 TABLET, FILM COATED ORAL at 15:20

## 2018-01-01 RX ADMIN — MEXILETINE HYDROCHLORIDE 250 MG: 250 CAPSULE ORAL at 06:11

## 2018-01-01 RX ADMIN — FERROUS SULFATE TAB 325 MG (65 MG ELEMENTAL FE) 325 MG: 325 (65 FE) TAB at 08:51

## 2018-01-01 RX ADMIN — INSULIN LISPRO 3 UNITS: 100 INJECTION, SOLUTION INTRAVENOUS; SUBCUTANEOUS at 20:40

## 2018-01-01 RX ADMIN — MIRTAZAPINE 30 MG: 15 TABLET, FILM COATED ORAL at 21:40

## 2018-01-01 RX ADMIN — HYDRALAZINE HYDROCHLORIDE 10 MG: 10 TABLET, FILM COATED ORAL at 14:13

## 2018-01-01 RX ADMIN — INSULIN GLARGINE 10 UNITS: 100 INJECTION, SOLUTION SUBCUTANEOUS at 21:09

## 2018-01-01 RX ADMIN — MEXILETINE HYDROCHLORIDE 200 MG: 200 CAPSULE ORAL at 21:44

## 2018-01-01 RX ADMIN — SODIUM CHLORIDE: 9 INJECTION, SOLUTION INTRAVENOUS at 12:30

## 2018-01-01 RX ADMIN — ALLOPURINOL 300 MG: 300 TABLET ORAL at 12:41

## 2018-01-01 RX ADMIN — PIPERACILLIN SODIUM AND TAZOBACTAM SODIUM 3.38 G: 3; .375 INJECTION, POWDER, LYOPHILIZED, FOR SOLUTION INTRAVENOUS at 08:24

## 2018-01-01 RX ADMIN — MIRTAZAPINE 30 MG: 15 TABLET, FILM COATED ORAL at 21:05

## 2018-01-01 RX ADMIN — ALBUTEROL SULFATE 2.5 MG: 2.5 SOLUTION RESPIRATORY (INHALATION) at 10:00

## 2018-01-01 RX ADMIN — SODIUM CHLORIDE 250 ML: 9 INJECTION, SOLUTION INTRAVENOUS at 12:40

## 2018-01-01 RX ADMIN — FERROUS SULFATE TAB 325 MG (65 MG ELEMENTAL FE) 325 MG: 325 (65 FE) TAB at 11:41

## 2018-01-01 RX ADMIN — BUMETANIDE 2 MG: 1 TABLET ORAL at 08:57

## 2018-01-01 RX ADMIN — HALOPERIDOL LACTATE 2 MG: 5 INJECTION, SOLUTION INTRAMUSCULAR at 15:04

## 2018-01-01 RX ADMIN — Medication 10 ML: at 21:23

## 2018-01-01 RX ADMIN — INSULIN GLARGINE 20 UNITS: 100 INJECTION, SOLUTION SUBCUTANEOUS at 21:19

## 2018-01-01 RX ADMIN — BUMETANIDE 2 MG: 1 TABLET ORAL at 20:14

## 2018-01-01 RX ADMIN — MEXILETINE HYDROCHLORIDE 250 MG: 250 CAPSULE ORAL at 05:59

## 2018-01-01 RX ADMIN — HYDRALAZINE HYDROCHLORIDE 25 MG: 25 TABLET, FILM COATED ORAL at 21:42

## 2018-01-01 RX ADMIN — HYDRALAZINE HYDROCHLORIDE 25 MG: 25 TABLET, FILM COATED ORAL at 12:05

## 2018-01-01 RX ADMIN — SENNOSIDES 17.2 MG: 8.6 TABLET, FILM COATED ORAL at 20:42

## 2018-01-01 RX ADMIN — Medication 10 ML: at 22:13

## 2018-01-01 RX ADMIN — Medication 10 ML: at 15:58

## 2018-01-01 RX ADMIN — MEXILETINE HYDROCHLORIDE 250 MG: 250 CAPSULE ORAL at 20:37

## 2018-01-01 RX ADMIN — INSULIN LISPRO 3 UNITS: 100 INJECTION, SOLUTION INTRAVENOUS; SUBCUTANEOUS at 17:00

## 2018-01-01 RX ADMIN — TRAZODONE HYDROCHLORIDE 25 MG: 50 TABLET ORAL at 21:40

## 2018-01-01 RX ADMIN — INSULIN LISPRO 2 UNITS: 100 INJECTION, SOLUTION INTRAVENOUS; SUBCUTANEOUS at 22:22

## 2018-01-01 RX ADMIN — ALLOPURINOL 300 MG: 300 TABLET ORAL at 12:28

## 2018-01-01 RX ADMIN — Medication 10 ML: at 21:35

## 2018-01-01 RX ADMIN — Medication 10 ML: at 20:45

## 2018-01-01 RX ADMIN — ASPIRIN 81 MG CHEWABLE TABLET 81 MG: 81 TABLET CHEWABLE at 08:51

## 2018-01-01 RX ADMIN — ONDANSETRON 4 MG: 2 INJECTION INTRAMUSCULAR; INTRAVENOUS at 20:21

## 2018-01-01 RX ADMIN — PIPERACILLIN SODIUM AND TAZOBACTAM SODIUM 4.5 G: 4; .5 INJECTION, POWDER, LYOPHILIZED, FOR SOLUTION INTRAVENOUS at 16:26

## 2018-01-01 RX ADMIN — POTASSIUM CHLORIDE 20 MEQ: 20 TABLET, EXTENDED RELEASE ORAL at 21:00

## 2018-01-01 RX ADMIN — FUROSEMIDE 40 MG: 10 INJECTION, SOLUTION INTRAMUSCULAR; INTRAVENOUS at 14:24

## 2018-01-01 RX ADMIN — TRAMADOL HYDROCHLORIDE 50 MG: 50 TABLET, FILM COATED ORAL at 18:30

## 2018-01-01 RX ADMIN — CEFEPIME HYDROCHLORIDE 2 G: 2 INJECTION, POWDER, FOR SOLUTION INTRAVENOUS at 21:46

## 2018-01-01 RX ADMIN — ISOSORBIDE MONONITRATE 60 MG: 60 TABLET, EXTENDED RELEASE ORAL at 21:08

## 2018-01-01 RX ADMIN — PANTOPRAZOLE SODIUM 40 MG: 40 TABLET, DELAYED RELEASE ORAL at 06:47

## 2018-01-01 RX ADMIN — BUMETANIDE 2 MG: 1 TABLET ORAL at 09:21

## 2018-01-01 RX ADMIN — ISOSORBIDE MONONITRATE 60 MG: 60 TABLET ORAL at 21:16

## 2018-01-01 RX ADMIN — METOPROLOL SUCCINATE 200 MG: 100 TABLET, FILM COATED, EXTENDED RELEASE ORAL at 09:02

## 2018-01-01 RX ADMIN — HYDROCODONE BITARTRATE AND ACETAMINOPHEN 1 TABLET: 5; 325 TABLET ORAL at 15:41

## 2018-01-01 RX ADMIN — MIRTAZAPINE 30 MG: 15 TABLET, FILM COATED ORAL at 21:36

## 2018-01-01 RX ADMIN — HYDRALAZINE HYDROCHLORIDE 25 MG: 25 TABLET, FILM COATED ORAL at 13:22

## 2018-01-01 RX ADMIN — PANTOPRAZOLE SODIUM 40 MG: 40 TABLET, DELAYED RELEASE ORAL at 06:19

## 2018-01-01 RX ADMIN — INSULIN GLARGINE 40 UNITS: 100 INJECTION, SOLUTION SUBCUTANEOUS at 23:37

## 2018-01-01 RX ADMIN — METOPROLOL SUCCINATE 200 MG: 100 TABLET, EXTENDED RELEASE ORAL at 20:39

## 2018-01-01 RX ADMIN — CEFTRIAXONE 1 G: 1 INJECTION, POWDER, FOR SOLUTION INTRAMUSCULAR; INTRAVENOUS at 17:15

## 2018-01-01 RX ADMIN — PETROLATUM: 42 OINTMENT TOPICAL at 08:36

## 2018-01-01 RX ADMIN — ALLOPURINOL 300 MG: 300 TABLET ORAL at 08:43

## 2018-01-01 RX ADMIN — HYDRALAZINE HYDROCHLORIDE 25 MG: 25 TABLET ORAL at 22:11

## 2018-01-01 RX ADMIN — IPRATROPIUM BROMIDE AND ALBUTEROL SULFATE 1 AMPULE: .5; 3 SOLUTION RESPIRATORY (INHALATION) at 15:46

## 2018-01-01 RX ADMIN — PIPERACILLIN SODIUM AND TAZOBACTAM SODIUM 3.38 G: 3; .375 INJECTION, POWDER, LYOPHILIZED, FOR SOLUTION INTRAVENOUS at 21:22

## 2018-01-01 RX ADMIN — FUROSEMIDE 40 MG: 10 INJECTION, SOLUTION INTRAMUSCULAR; INTRAVENOUS at 11:36

## 2018-01-01 RX ADMIN — CEFTRIAXONE 1 G: 1 INJECTION, POWDER, FOR SOLUTION INTRAMUSCULAR; INTRAVENOUS at 08:56

## 2018-01-01 RX ADMIN — INSULIN LISPRO 2 UNITS: 100 INJECTION, SOLUTION INTRAVENOUS; SUBCUTANEOUS at 09:11

## 2018-01-01 RX ADMIN — ISOSORBIDE MONONITRATE 60 MG: 60 TABLET, EXTENDED RELEASE ORAL at 22:09

## 2018-01-01 RX ADMIN — VITAMIN D, TAB 1000IU (100/BT) 1000 UNITS: 25 TAB at 11:46

## 2018-01-01 RX ADMIN — MAGNESIUM HYDROXIDE 30 ML: 400 SUSPENSION ORAL at 12:24

## 2018-01-01 RX ADMIN — INSULIN LISPRO 1 UNITS: 100 INJECTION, SOLUTION INTRAVENOUS; SUBCUTANEOUS at 20:25

## 2018-01-01 RX ADMIN — HYDRALAZINE HYDROCHLORIDE 25 MG: 25 TABLET, FILM COATED ORAL at 12:56

## 2018-01-01 RX ADMIN — Medication 10 ML: at 08:46

## 2018-01-01 RX ADMIN — ISOSORBIDE MONONITRATE 60 MG: 60 TABLET, EXTENDED RELEASE ORAL at 08:36

## 2018-01-01 RX ADMIN — ISOSORBIDE MONONITRATE 60 MG: 60 TABLET, EXTENDED RELEASE ORAL at 08:33

## 2018-01-01 RX ADMIN — INSULIN LISPRO 4 UNITS: 100 INJECTION, SOLUTION INTRAVENOUS; SUBCUTANEOUS at 11:38

## 2018-01-01 RX ADMIN — PROPOFOL 50 MCG/KG/MIN: 10 INJECTION, EMULSION INTRAVENOUS at 09:07

## 2018-01-01 RX ADMIN — INSULIN LISPRO 6 UNITS: 100 INJECTION, SOLUTION INTRAVENOUS; SUBCUTANEOUS at 11:44

## 2018-01-01 RX ADMIN — PHYTONADIONE 5 MG: 10 INJECTION, EMULSION INTRAMUSCULAR; INTRAVENOUS; SUBCUTANEOUS at 06:41

## 2018-01-01 RX ADMIN — HYDRALAZINE HYDROCHLORIDE 10 MG: 10 TABLET, FILM COATED ORAL at 14:07

## 2018-01-01 RX ADMIN — HYDRALAZINE HYDROCHLORIDE 25 MG: 25 TABLET, FILM COATED ORAL at 06:26

## 2018-01-01 RX ADMIN — HYDRALAZINE HYDROCHLORIDE 25 MG: 25 TABLET, FILM COATED ORAL at 14:08

## 2018-01-01 RX ADMIN — FERROUS SULFATE TAB 325 MG (65 MG ELEMENTAL FE) 325 MG: 325 (65 FE) TAB at 08:36

## 2018-01-01 RX ADMIN — INSULIN LISPRO 5 UNITS: 100 INJECTION, SOLUTION INTRAVENOUS; SUBCUTANEOUS at 21:58

## 2018-01-01 RX ADMIN — PETROLATUM: 42 OINTMENT TOPICAL at 22:00

## 2018-01-01 RX ADMIN — INSULIN GLARGINE 20 UNITS: 100 INJECTION, SOLUTION SUBCUTANEOUS at 21:25

## 2018-01-01 RX ADMIN — METOPROLOL SUCCINATE 200 MG: 100 TABLET, EXTENDED RELEASE ORAL at 21:34

## 2018-01-01 RX ADMIN — INSULIN GLARGINE 20 UNITS: 100 INJECTION, SOLUTION SUBCUTANEOUS at 21:03

## 2018-01-01 RX ADMIN — ALLOPURINOL 300 MG: 300 TABLET ORAL at 09:21

## 2018-01-01 RX ADMIN — METOPROLOL SUCCINATE 200 MG: 100 TABLET, EXTENDED RELEASE ORAL at 21:08

## 2018-01-01 RX ADMIN — ENOXAPARIN SODIUM 40 MG: 40 INJECTION SUBCUTANEOUS at 08:26

## 2018-01-01 RX ADMIN — VANCOMYCIN HYDROCHLORIDE 1250 MG: 10 INJECTION, POWDER, LYOPHILIZED, FOR SOLUTION INTRAVENOUS at 07:44

## 2018-01-01 RX ADMIN — MIRTAZAPINE 30 MG: 15 TABLET, FILM COATED ORAL at 21:22

## 2018-01-01 RX ADMIN — ISOSORBIDE MONONITRATE 60 MG: 60 TABLET, EXTENDED RELEASE ORAL at 09:02

## 2018-01-01 RX ADMIN — Medication 10 ML: at 21:11

## 2018-01-01 RX ADMIN — Medication 10 ML: at 21:14

## 2018-01-01 RX ADMIN — POTASSIUM CHLORIDE 20 MEQ: 1500 TABLET, EXTENDED RELEASE ORAL at 09:21

## 2018-01-01 RX ADMIN — PROPOFOL 50 MG: 10 INJECTION, EMULSION INTRAVENOUS at 09:07

## 2018-01-01 RX ADMIN — INSULIN LISPRO 3 UNITS: 100 INJECTION, SOLUTION INTRAVENOUS; SUBCUTANEOUS at 21:18

## 2018-01-01 RX ADMIN — METOPROLOL SUCCINATE 200 MG: 100 TABLET, FILM COATED, EXTENDED RELEASE ORAL at 08:43

## 2018-01-01 RX ADMIN — METOPROLOL SUCCINATE 50 MG: 50 TABLET, EXTENDED RELEASE ORAL at 21:42

## 2018-01-01 RX ADMIN — PANTOPRAZOLE SODIUM 40 MG: 40 INJECTION, POWDER, FOR SOLUTION INTRAVENOUS at 08:14

## 2018-01-01 RX ADMIN — ENOXAPARIN SODIUM 40 MG: 40 INJECTION SUBCUTANEOUS at 18:50

## 2018-01-01 RX ADMIN — ISOSORBIDE MONONITRATE 60 MG: 60 TABLET, EXTENDED RELEASE ORAL at 08:50

## 2018-01-01 RX ADMIN — ALLOPURINOL 300 MG: 300 TABLET ORAL at 11:40

## 2018-01-01 RX ADMIN — CEFTRIAXONE SODIUM 1 G: 1 INJECTION, POWDER, FOR SOLUTION INTRAMUSCULAR; INTRAVENOUS at 18:04

## 2018-01-01 RX ADMIN — POTASSIUM CHLORIDE 20 MEQ: 20 TABLET, EXTENDED RELEASE ORAL at 08:35

## 2018-01-01 RX ADMIN — INSULIN GLARGINE 20 UNITS: 100 INJECTION, SOLUTION SUBCUTANEOUS at 21:29

## 2018-01-01 RX ADMIN — INSULIN LISPRO 2 UNITS: 100 INJECTION, SOLUTION INTRAVENOUS; SUBCUTANEOUS at 16:21

## 2018-01-01 RX ADMIN — MEXILETINE HYDROCHLORIDE 250 MG: 250 CAPSULE ORAL at 22:22

## 2018-01-01 RX ADMIN — IPRATROPIUM BROMIDE AND ALBUTEROL SULFATE 1 AMPULE: .5; 3 SOLUTION RESPIRATORY (INHALATION) at 12:13

## 2018-01-01 RX ADMIN — TRAZODONE HYDROCHLORIDE 25 MG: 50 TABLET ORAL at 20:08

## 2018-01-01 RX ADMIN — PETROLATUM: 42 OINTMENT TOPICAL at 08:33

## 2018-01-01 RX ADMIN — VITAMIN D, TAB 1000IU (100/BT) 1000 UNITS: 25 TAB at 22:00

## 2018-01-01 RX ADMIN — ISOSORBIDE MONONITRATE 60 MG: 60 TABLET, EXTENDED RELEASE ORAL at 08:35

## 2018-01-01 RX ADMIN — ALLOPURINOL 300 MG: 300 TABLET ORAL at 12:24

## 2018-01-01 RX ADMIN — POTASSIUM CHLORIDE 20 MEQ: 1500 TABLET, EXTENDED RELEASE ORAL at 21:42

## 2018-01-01 RX ADMIN — FUROSEMIDE 40 MG: 10 INJECTION, SOLUTION INTRAMUSCULAR; INTRAVENOUS at 10:59

## 2018-01-01 RX ADMIN — PIPERACILLIN SODIUM AND TAZOBACTAM SODIUM 3.38 G: 3; .375 INJECTION, POWDER, LYOPHILIZED, FOR SOLUTION INTRAVENOUS at 23:38

## 2018-01-01 RX ADMIN — ISOSORBIDE MONONITRATE 60 MG: 60 TABLET, EXTENDED RELEASE ORAL at 10:21

## 2018-01-01 RX ADMIN — Medication 10 ML: at 20:30

## 2018-01-01 RX ADMIN — MORPHINE SULFATE 1 MG: 2 INJECTION, SOLUTION INTRAMUSCULAR; INTRAVENOUS at 14:00

## 2018-01-01 RX ADMIN — ISOSORBIDE MONONITRATE 60 MG: 60 TABLET, EXTENDED RELEASE ORAL at 21:35

## 2018-01-01 RX ADMIN — PETROLATUM: 42 OINTMENT TOPICAL at 10:07

## 2018-01-01 RX ADMIN — INSULIN LISPRO 6 UNITS: 100 INJECTION, SOLUTION INTRAVENOUS; SUBCUTANEOUS at 12:28

## 2018-01-01 RX ADMIN — ISOSORBIDE MONONITRATE 60 MG: 60 TABLET, EXTENDED RELEASE ORAL at 08:19

## 2018-01-01 RX ADMIN — MIRTAZAPINE 30 MG: 15 TABLET, FILM COATED ORAL at 22:07

## 2018-01-01 RX ADMIN — CEFTRIAXONE SODIUM 1 G: 1 INJECTION, POWDER, FOR SOLUTION INTRAMUSCULAR; INTRAVENOUS at 21:44

## 2018-01-01 RX ADMIN — HYDRALAZINE HYDROCHLORIDE 25 MG: 25 TABLET, FILM COATED ORAL at 21:38

## 2018-01-01 RX ADMIN — PETROLATUM: 42 OINTMENT TOPICAL at 09:16

## 2018-01-01 RX ADMIN — VITAMIN D, TAB 1000IU (100/BT) 1000 UNITS: 25 TAB at 12:26

## 2018-01-01 RX ADMIN — VITAMIN D, TAB 1000IU (100/BT) 1000 UNITS: 25 TAB at 11:53

## 2018-01-01 RX ADMIN — ALLOPURINOL 300 MG: 300 TABLET ORAL at 12:04

## 2018-01-01 RX ADMIN — FENTANYL CITRATE 50 MCG: 50 INJECTION, SOLUTION INTRAMUSCULAR; INTRAVENOUS at 10:05

## 2018-01-01 RX ADMIN — SODIUM CHLORIDE: 9 INJECTION, SOLUTION INTRAVENOUS at 07:48

## 2018-01-01 RX ADMIN — Medication 10 ML: at 08:26

## 2018-01-01 RX ADMIN — METOPROLOL SUCCINATE 200 MG: 100 TABLET, FILM COATED, EXTENDED RELEASE ORAL at 09:21

## 2018-01-01 RX ADMIN — FUROSEMIDE 40 MG: 10 INJECTION, SOLUTION INTRAMUSCULAR; INTRAVENOUS at 08:48

## 2018-01-01 RX ADMIN — PETROLATUM: 42 OINTMENT TOPICAL at 21:43

## 2018-01-01 RX ADMIN — SENNOSIDES 17.2 MG: 8.6 TABLET, FILM COATED ORAL at 20:29

## 2018-01-01 RX ADMIN — VITAMIN D, TAB 1000IU (100/BT) 1000 UNITS: 25 TAB at 14:22

## 2018-01-01 RX ADMIN — Medication 10 ML: at 21:15

## 2018-01-01 RX ADMIN — FERROUS SULFATE TAB 325 MG (65 MG ELEMENTAL FE) 325 MG: 325 (65 FE) TAB at 18:31

## 2018-01-01 RX ADMIN — FENTANYL CITRATE 50 MCG: 50 INJECTION, SOLUTION INTRAMUSCULAR; INTRAVENOUS at 09:07

## 2018-01-01 RX ADMIN — FLUCONAZOLE 200 MG: 100 TABLET ORAL at 18:31

## 2018-01-01 RX ADMIN — FUROSEMIDE 40 MG: 10 INJECTION, SOLUTION INTRAMUSCULAR; INTRAVENOUS at 10:41

## 2018-01-01 RX ADMIN — Medication 10 ML: at 21:22

## 2018-01-01 RX ADMIN — PIPERACILLIN SODIUM AND TAZOBACTAM SODIUM 3.38 G: 3; .375 INJECTION, POWDER, LYOPHILIZED, FOR SOLUTION INTRAVENOUS at 00:30

## 2018-01-01 RX ADMIN — BUMETANIDE 2 MG: 1 TABLET ORAL at 21:40

## 2018-01-01 RX ADMIN — POTASSIUM CHLORIDE 20 MEQ: 20 TABLET, EXTENDED RELEASE ORAL at 12:33

## 2018-01-01 RX ADMIN — INSULIN GLARGINE 50 UNITS: 100 INJECTION, SOLUTION SUBCUTANEOUS at 20:39

## 2018-01-01 RX ADMIN — Medication 10 ML: at 20:08

## 2018-01-01 RX ADMIN — SODIUM CHLORIDE: 9 INJECTION, SOLUTION INTRAVENOUS at 11:03

## 2018-01-01 RX ADMIN — INSULIN LISPRO 4 UNITS: 100 INJECTION, SOLUTION INTRAVENOUS; SUBCUTANEOUS at 11:46

## 2018-01-01 RX ADMIN — ACETAMINOPHEN 650 MG: 325 TABLET, FILM COATED ORAL at 14:38

## 2018-01-01 RX ADMIN — MEXILETINE HYDROCHLORIDE 200 MG: 200 CAPSULE ORAL at 14:43

## 2018-01-01 RX ADMIN — INSULIN LISPRO 2 UNITS: 100 INJECTION, SOLUTION INTRAVENOUS; SUBCUTANEOUS at 11:07

## 2018-01-01 RX ADMIN — ONDANSETRON 4 MG: 2 INJECTION INTRAMUSCULAR; INTRAVENOUS at 09:37

## 2018-01-01 RX ADMIN — MORPHINE SULFATE 1 MG: 2 INJECTION, SOLUTION INTRAMUSCULAR; INTRAVENOUS at 23:44

## 2018-01-01 RX ADMIN — MIRTAZAPINE 30 MG: 15 TABLET, FILM COATED ORAL at 21:42

## 2018-01-01 RX ADMIN — SENNOSIDES 17.2 MG: 8.6 TABLET, FILM COATED ORAL at 21:10

## 2018-01-01 RX ADMIN — INSULIN LISPRO 2 UNITS: 100 INJECTION, SOLUTION INTRAVENOUS; SUBCUTANEOUS at 11:21

## 2018-01-01 RX ADMIN — POTASSIUM CHLORIDE 20 MEQ: 20 TABLET, EXTENDED RELEASE ORAL at 09:02

## 2018-01-01 RX ADMIN — NOREPINEPHRINE BITARTRATE 5 MCG/MIN: 1 INJECTION, SOLUTION, CONCENTRATE INTRAVENOUS at 06:07

## 2018-01-01 RX ADMIN — HYDRALAZINE HYDROCHLORIDE 25 MG: 25 TABLET, FILM COATED ORAL at 21:09

## 2018-01-01 RX ADMIN — METOPROLOL SUCCINATE 200 MG: 100 TABLET, EXTENDED RELEASE ORAL at 09:02

## 2018-01-01 RX ADMIN — ISOSORBIDE MONONITRATE 60 MG: 60 TABLET, EXTENDED RELEASE ORAL at 20:48

## 2018-01-01 RX ADMIN — MIRTAZAPINE 30 MG: 15 TABLET, FILM COATED ORAL at 21:09

## 2018-01-01 RX ADMIN — Medication 10 ML: at 09:16

## 2018-01-01 RX ADMIN — INSULIN GLARGINE 50 UNITS: 100 INJECTION, SOLUTION SUBCUTANEOUS at 20:42

## 2018-01-01 RX ADMIN — Medication 10 ML: at 08:25

## 2018-01-01 RX ADMIN — BUMETANIDE 2 MG: 1 TABLET ORAL at 08:29

## 2018-01-01 RX ADMIN — PANTOPRAZOLE SODIUM 40 MG: 40 INJECTION, POWDER, FOR SOLUTION INTRAVENOUS at 11:02

## 2018-01-01 RX ADMIN — HYDRALAZINE HYDROCHLORIDE 25 MG: 25 TABLET, FILM COATED ORAL at 13:45

## 2018-01-01 RX ADMIN — MEXILETINE HYDROCHLORIDE 250 MG: 250 CAPSULE ORAL at 22:09

## 2018-01-01 RX ADMIN — ISOSORBIDE MONONITRATE 60 MG: 60 TABLET, EXTENDED RELEASE ORAL at 09:31

## 2018-01-01 RX ADMIN — MEXILETINE HYDROCHLORIDE 250 MG: 250 CAPSULE ORAL at 22:39

## 2018-01-01 RX ADMIN — Medication 10 ML: at 21:00

## 2018-01-01 RX ADMIN — METOPROLOL SUCCINATE 200 MG: 100 TABLET, EXTENDED RELEASE ORAL at 21:04

## 2018-01-01 RX ADMIN — MEXILETINE HYDROCHLORIDE 250 MG: 250 CAPSULE ORAL at 12:56

## 2018-01-01 RX ADMIN — FERROUS SULFATE TAB 325 MG (65 MG ELEMENTAL FE) 325 MG: 325 (65 FE) TAB at 12:28

## 2018-01-01 RX ADMIN — WARFARIN SODIUM 7.5 MG: 7.5 TABLET ORAL at 21:42

## 2018-01-01 RX ADMIN — PETROLATUM: 42 OINTMENT TOPICAL at 22:19

## 2018-01-01 RX ADMIN — HYDRALAZINE HYDROCHLORIDE 25 MG: 25 TABLET, FILM COATED ORAL at 21:15

## 2018-01-01 RX ADMIN — INSULIN LISPRO 4 UNITS: 100 INJECTION, SOLUTION INTRAVENOUS; SUBCUTANEOUS at 11:51

## 2018-01-01 RX ADMIN — METOPROLOL SUCCINATE 200 MG: 100 TABLET, EXTENDED RELEASE ORAL at 21:48

## 2018-01-01 RX ADMIN — INSULIN LISPRO 4 UNITS: 100 INJECTION, SOLUTION INTRAVENOUS; SUBCUTANEOUS at 06:58

## 2018-01-01 RX ADMIN — HYDRALAZINE HYDROCHLORIDE 25 MG: 25 TABLET, FILM COATED ORAL at 14:59

## 2018-01-01 RX ADMIN — HYDRALAZINE HYDROCHLORIDE 25 MG: 25 TABLET, FILM COATED ORAL at 20:48

## 2018-01-01 RX ADMIN — MORPHINE SULFATE 1 MG: 2 INJECTION, SOLUTION INTRAMUSCULAR; INTRAVENOUS at 21:32

## 2018-01-01 RX ADMIN — POTASSIUM CHLORIDE 20 MEQ: 20 TABLET, EXTENDED RELEASE ORAL at 10:10

## 2018-01-01 RX ADMIN — POTASSIUM CHLORIDE 20 MEQ: 20 TABLET, EXTENDED RELEASE ORAL at 21:10

## 2018-01-01 RX ADMIN — Medication 10 ML: at 21:43

## 2018-01-01 RX ADMIN — MUPIROCIN: 20 OINTMENT TOPICAL at 08:38

## 2018-01-01 RX ADMIN — MEXILETINE HYDROCHLORIDE 250 MG: 250 CAPSULE ORAL at 20:48

## 2018-01-01 RX ADMIN — Medication 10 ML: at 08:31

## 2018-01-01 RX ADMIN — HYDRALAZINE HYDROCHLORIDE 25 MG: 25 TABLET, FILM COATED ORAL at 21:54

## 2018-01-01 RX ADMIN — CHOLECALCIFEROL TAB 25 MCG (1000 UNIT) 1000 UNITS: 25 TAB at 12:04

## 2018-01-01 RX ADMIN — Medication 10 ML: at 08:35

## 2018-01-01 RX ADMIN — HYDRALAZINE HYDROCHLORIDE 25 MG: 25 TABLET, FILM COATED ORAL at 14:01

## 2018-01-01 RX ADMIN — ISOSORBIDE MONONITRATE 60 MG: 60 TABLET, EXTENDED RELEASE ORAL at 22:21

## 2018-01-01 RX ADMIN — IPRATROPIUM BROMIDE AND ALBUTEROL SULFATE 1 AMPULE: .5; 3 SOLUTION RESPIRATORY (INHALATION) at 08:51

## 2018-01-01 RX ADMIN — BUMETANIDE 2 MG: 1 TABLET ORAL at 20:24

## 2018-01-01 RX ADMIN — MEXILETINE HYDROCHLORIDE 200 MG: 200 CAPSULE ORAL at 14:18

## 2018-01-01 RX ADMIN — PETROLATUM: 42 OINTMENT TOPICAL at 21:06

## 2018-01-01 RX ADMIN — Medication 10 ML: at 23:29

## 2018-01-01 RX ADMIN — SODIUM CHLORIDE 250 ML: 9 INJECTION, SOLUTION INTRAVENOUS at 03:32

## 2018-01-01 RX ADMIN — INSULIN LISPRO 3 UNITS: 100 INJECTION, SOLUTION INTRAVENOUS; SUBCUTANEOUS at 12:07

## 2018-01-01 RX ADMIN — MAGNESIUM HYDROXIDE 30 ML: 400 SUSPENSION ORAL at 10:02

## 2018-01-01 RX ADMIN — Medication 10 ML: at 21:31

## 2018-01-01 RX ADMIN — HYDRALAZINE HYDROCHLORIDE 25 MG: 25 TABLET, FILM COATED ORAL at 14:17

## 2018-01-01 RX ADMIN — MEXILETINE HYDROCHLORIDE 250 MG: 250 CAPSULE ORAL at 05:25

## 2018-01-01 RX ADMIN — Medication 10 ML: at 10:11

## 2018-01-01 RX ADMIN — POTASSIUM CHLORIDE 20 MEQ: 20 TABLET, EXTENDED RELEASE ORAL at 08:19

## 2018-01-01 RX ADMIN — POTASSIUM CHLORIDE 20 MEQ: 20 TABLET, EXTENDED RELEASE ORAL at 08:51

## 2018-01-01 RX ADMIN — METOPROLOL SUCCINATE 200 MG: 100 TABLET, EXTENDED RELEASE ORAL at 08:51

## 2018-01-01 RX ADMIN — HYDRALAZINE HYDROCHLORIDE 25 MG: 25 TABLET, FILM COATED ORAL at 06:14

## 2018-01-01 ASSESSMENT — PAIN SCALES - GENERAL
PAINLEVEL_OUTOF10: 6
PAINLEVEL_OUTOF10: 0
PAINLEVEL_OUTOF10: 0
PAINLEVEL_OUTOF10: 2
PAINLEVEL_OUTOF10: 0
PAINLEVEL_OUTOF10: 2
PAINLEVEL_OUTOF10: 5
PAINLEVEL_OUTOF10: 5
PAINLEVEL_OUTOF10: 6
PAINLEVEL_OUTOF10: 0
PAINLEVEL_OUTOF10: 5
PAINLEVEL_OUTOF10: 0
PAINLEVEL_OUTOF10: 0
PAINLEVEL_OUTOF10: 8
PAINLEVEL_OUTOF10: 0
PAINLEVEL_OUTOF10: 5
PAINLEVEL_OUTOF10: 0
PAINLEVEL_OUTOF10: 10
PAINLEVEL_OUTOF10: 6
PAINLEVEL_OUTOF10: 0
PAINLEVEL_OUTOF10: 2
PAINLEVEL_OUTOF10: 0
PAINLEVEL_OUTOF10: 0
PAINLEVEL_OUTOF10: 4
PAINLEVEL_OUTOF10: 0
PAINLEVEL_OUTOF10: 2
PAINLEVEL_OUTOF10: 0
PAINLEVEL_OUTOF10: 5
PAINLEVEL_OUTOF10: 10
PAINLEVEL_OUTOF10: 0
PAINLEVEL_OUTOF10: 6
PAINLEVEL_OUTOF10: 0
PAINLEVEL_OUTOF10: 1
PAINLEVEL_OUTOF10: 6
PAINLEVEL_OUTOF10: 0
PAINLEVEL_OUTOF10: 3
PAINLEVEL_OUTOF10: 0
PAINLEVEL_OUTOF10: 3
PAINLEVEL_OUTOF10: 0
PAINLEVEL_OUTOF10: 0
PAINLEVEL_OUTOF10: 4
PAINLEVEL_OUTOF10: 0
PAINLEVEL_OUTOF10: 10
PAINLEVEL_OUTOF10: 1
PAINLEVEL_OUTOF10: 0
PAINLEVEL_OUTOF10: 10
PAINLEVEL_OUTOF10: 0
PAINLEVEL_OUTOF10: 10
PAINLEVEL_OUTOF10: 0
PAINLEVEL_OUTOF10: 0
PAINLEVEL_OUTOF10: 4
PAINLEVEL_OUTOF10: 0
PAINLEVEL_OUTOF10: 10
PAINLEVEL_OUTOF10: 5
PAINLEVEL_OUTOF10: 6
PAINLEVEL_OUTOF10: 0
PAINLEVEL_OUTOF10: 10
PAINLEVEL_OUTOF10: 0
PAINLEVEL_OUTOF10: 4
PAINLEVEL_OUTOF10: 0
PAINLEVEL_OUTOF10: 6
PAINLEVEL_OUTOF10: 0
PAINLEVEL_OUTOF10: 0
PAINLEVEL_OUTOF10: 5
PAINLEVEL_OUTOF10: 5
PAINLEVEL_OUTOF10: 0
PAINLEVEL_OUTOF10: 2
PAINLEVEL_OUTOF10: 5
PAINLEVEL_OUTOF10: 4
PAINLEVEL_OUTOF10: 2
PAINLEVEL_OUTOF10: 0
PAINLEVEL_OUTOF10: 10
PAINLEVEL_OUTOF10: 0
PAINLEVEL_OUTOF10: 0
PAINLEVEL_OUTOF10: 7
PAINLEVEL_OUTOF10: 6
PAINLEVEL_OUTOF10: 6
PAINLEVEL_OUTOF10: 0
PAINLEVEL_OUTOF10: 5
PAINLEVEL_OUTOF10: 0
PAINLEVEL_OUTOF10: 10
PAINLEVEL_OUTOF10: 0
PAINLEVEL_OUTOF10: 1
PAINLEVEL_OUTOF10: 6
PAINLEVEL_OUTOF10: 0
PAINLEVEL_OUTOF10: 0
PAINLEVEL_OUTOF10: 5
PAINLEVEL_OUTOF10: 0
PAINLEVEL_OUTOF10: 0

## 2018-01-01 ASSESSMENT — PAIN DESCRIPTION - FREQUENCY
FREQUENCY: CONTINUOUS
FREQUENCY: CONTINUOUS
FREQUENCY: INTERMITTENT
FREQUENCY: CONTINUOUS
FREQUENCY: INTERMITTENT
FREQUENCY: CONTINUOUS
FREQUENCY: INTERMITTENT
FREQUENCY: INTERMITTENT
FREQUENCY: CONTINUOUS
FREQUENCY: CONTINUOUS
FREQUENCY: INTERMITTENT
FREQUENCY: CONTINUOUS
FREQUENCY: INTERMITTENT
FREQUENCY: INTERMITTENT
FREQUENCY: CONTINUOUS
FREQUENCY: INTERMITTENT
FREQUENCY: INTERMITTENT

## 2018-01-01 ASSESSMENT — ENCOUNTER SYMPTOMS
ABDOMINAL PAIN: 0
ABDOMINAL PAIN: 0
SINUS PRESSURE: 0
COUGH: 1
DIARRHEA: 0
EYE DISCHARGE: 0
VOMITING: 0
WHEEZING: 0
EYE PAIN: 0
SORE THROAT: 0
SHORTNESS OF BREATH: 0
WHEEZING: 0
ABDOMINAL PAIN: 0
EYE REDNESS: 0
NAUSEA: 0
CHEST TIGHTNESS: 0
DIARRHEA: 0
SHORTNESS OF BREATH: 0
WHEEZING: 0
BACK PAIN: 0
COUGH: 0
STRIDOR: 0
ABDOMINAL PAIN: 1
DIARRHEA: 0
NAUSEA: 0
CHEST TIGHTNESS: 0
SHORTNESS OF BREATH: 1
VISUAL CHANGE: 0
SHORTNESS OF BREATH: 1
SHORTNESS OF BREATH: 0
SORE THROAT: 0
BACK PAIN: 0
SHORTNESS OF BREATH: 0
EYE PAIN: 0
ABDOMINAL PAIN: 0
EYE REDNESS: 0
VOMITING: 0
HEMATOCHEZIA: 0
VOMITING: 0
SORE THROAT: 0
NAUSEA: 0
BACK PAIN: 0
SINUS PRESSURE: 0
COLOR CHANGE: 0
NAUSEA: 1
COUGH: 0
SHORTNESS OF BREATH: 0
VOMITING: 0
ABDOMINAL DISTENTION: 0
COUGH: 0
CONSTIPATION: 0
DIARRHEA: 0
EYE DISCHARGE: 0

## 2018-01-01 ASSESSMENT — PAIN SCALES - PAIN ASSESSMENT IN ADVANCED DEMENTIA (PAINAD)
BODYLANGUAGE: 1
CONSOLABILITY: 0
TOTALSCORE: 5
NEGVOCALIZATION: 2
BODYLANGUAGE: 0
BREATHING: 1
TOTALSCORE: 0
FACIALEXPRESSION: 0
FACIALEXPRESSION: 0
CONSOLABILITY: 1
NEGVOCALIZATION: 0
BREATHING: 0

## 2018-01-01 ASSESSMENT — PAIN DESCRIPTION - PAIN TYPE
TYPE: ACUTE PAIN
TYPE: SURGICAL PAIN
TYPE: ACUTE PAIN
TYPE: SURGICAL PAIN
TYPE: ACUTE PAIN
TYPE: SURGICAL PAIN
TYPE: ACUTE PAIN
TYPE: SURGICAL PAIN
TYPE: ACUTE PAIN
TYPE: SURGICAL PAIN

## 2018-01-01 ASSESSMENT — PAIN DESCRIPTION - ORIENTATION
ORIENTATION: MID
ORIENTATION: LOWER
ORIENTATION: LEFT;RIGHT
ORIENTATION: MID
ORIENTATION: OTHER (COMMENT)
ORIENTATION: LEFT
ORIENTATION: MID
ORIENTATION: RIGHT;LEFT
ORIENTATION: RIGHT
ORIENTATION: LOWER
ORIENTATION: MID
ORIENTATION: LOWER
ORIENTATION: LOWER
ORIENTATION: RIGHT;LEFT
ORIENTATION: LEFT
ORIENTATION: LOWER
ORIENTATION: LOWER
ORIENTATION: LOWER;RIGHT
ORIENTATION: LEFT
ORIENTATION: LEFT
ORIENTATION: LOWER
ORIENTATION: LOWER
ORIENTATION: MID

## 2018-01-01 ASSESSMENT — PAIN DESCRIPTION - DESCRIPTORS
DESCRIPTORS: PRESSURE
DESCRIPTORS: ACHING;BURNING;CONSTANT
DESCRIPTORS: SHARP;THROBBING
DESCRIPTORS: ACHING;CONSTANT;CRAMPING
DESCRIPTORS: DISCOMFORT;OTHER (COMMENT)
DESCRIPTORS: ACHING;DISCOMFORT
DESCRIPTORS: ACHING;DISCOMFORT
DESCRIPTORS: BURNING
DESCRIPTORS: ACHING;PRESSURE
DESCRIPTORS: ACHING;DISCOMFORT
DESCRIPTORS: ACHING;BURNING
DESCRIPTORS: SHARP
DESCRIPTORS: ACHING;BURNING
DESCRIPTORS: ACHING;BURNING;DISCOMFORT
DESCRIPTORS: SHARP
DESCRIPTORS: ACHING;DISCOMFORT;SORE
DESCRIPTORS: ACHING
DESCRIPTORS: SHARP
DESCRIPTORS: DISCOMFORT;HEAVINESS;TIGHTNESS;PRESSURE
DESCRIPTORS: ACHING;CONSTANT;DISCOMFORT
DESCRIPTORS: PRESSURE
DESCRIPTORS: ACHING;DISCOMFORT
DESCRIPTORS: SPASM;SHARP
DESCRIPTORS: OTHER (COMMENT)
DESCRIPTORS: ACHING;DISCOMFORT
DESCRIPTORS: ACHING;DISCOMFORT
DESCRIPTORS: ACHING;CONSTANT;DISCOMFORT
DESCRIPTORS: ACHING;CONSTANT;DISCOMFORT
DESCRIPTORS: SHARP
DESCRIPTORS: ACHING;CRAMPING

## 2018-01-01 ASSESSMENT — PULMONARY FUNCTION TESTS
PIF_VALUE: 1
PIF_VALUE: 0
PIF_VALUE: 0
PIF_VALUE: 1
PIF_VALUE: 0
PIF_VALUE: 0
PIF_VALUE: 1
PIF_VALUE: 1
PIF_VALUE: 0
PIF_VALUE: 1
PIF_VALUE: 1
PIF_VALUE: 0
PIF_VALUE: 0
PIF_VALUE: 1
PIF_VALUE: 0
PIF_VALUE: 1
PIF_VALUE: 0
PIF_VALUE: 1
PIF_VALUE: 0
PIF_VALUE: 1
PIF_VALUE: 0
PIF_VALUE: 1
PIF_VALUE: 0
PIF_VALUE: 1
PIF_VALUE: 0
PIF_VALUE: 1
PIF_VALUE: 0
PIF_VALUE: 0
PIF_VALUE: 1
PIF_VALUE: 1
PIF_VALUE: 0
PIF_VALUE: 1
PIF_VALUE: 1
PIF_VALUE: 0
PIF_VALUE: 1
PIF_VALUE: 0
PIF_VALUE: 1
PIF_VALUE: 0
PIF_VALUE: 1
PIF_VALUE: 1
PIF_VALUE: 0
PIF_VALUE: 1
PIF_VALUE: 0

## 2018-01-01 ASSESSMENT — PAIN DESCRIPTION - LOCATION
LOCATION: PENIS
LOCATION: PENIS
LOCATION: CHEST
LOCATION: CHEST
LOCATION: PENIS;ABDOMEN
LOCATION: ABDOMEN
LOCATION: PENIS
LOCATION: LEG
LOCATION: ABDOMEN;PENIS
LOCATION: PENIS
LOCATION: GENERALIZED;LEG
LOCATION: ABDOMEN;PENIS
LOCATION: PENIS
LOCATION: CHEST
LOCATION: PENIS
LOCATION: ABDOMEN
LOCATION: LEG
LOCATION: HEAD;ABDOMEN
LOCATION: ABDOMEN
LOCATION: FLANK
LOCATION: PENIS
LOCATION: PENIS
LOCATION: ABDOMEN
LOCATION: PENIS
LOCATION: FLANK
LOCATION: PENIS;ABDOMEN
LOCATION: ABDOMEN;PENIS
LOCATION: ABDOMEN
LOCATION: LEG
LOCATION: FLANK
LOCATION: FLANK
LOCATION: PENIS
LOCATION: PENIS
LOCATION: ABDOMEN;PENIS
LOCATION: GROIN

## 2018-01-01 ASSESSMENT — PAIN DESCRIPTION - ONSET
ONSET: GRADUAL
ONSET: ON-GOING
ONSET: SUDDEN
ONSET: ON-GOING
ONSET: SUDDEN
ONSET: ON-GOING
ONSET: ON-GOING
ONSET: GRADUAL
ONSET: ON-GOING

## 2018-01-01 ASSESSMENT — PAIN DESCRIPTION - PROGRESSION
CLINICAL_PROGRESSION: NOT CHANGED
CLINICAL_PROGRESSION: GRADUALLY IMPROVING
CLINICAL_PROGRESSION: GRADUALLY WORSENING
CLINICAL_PROGRESSION: GRADUALLY IMPROVING
CLINICAL_PROGRESSION: NOT CHANGED
CLINICAL_PROGRESSION: GRADUALLY WORSENING
CLINICAL_PROGRESSION: NOT CHANGED
CLINICAL_PROGRESSION: GRADUALLY IMPROVING

## 2018-01-01 ASSESSMENT — LIFESTYLE VARIABLES: SMOKING_STATUS: 0

## 2018-01-01 ASSESSMENT — PAIN SCALES - WONG BAKER: WONGBAKER_NUMERICALRESPONSE: 4

## 2018-01-01 ASSESSMENT — PAIN - FUNCTIONAL ASSESSMENT: PAIN_FUNCTIONAL_ASSESSMENT: 0-10

## 2018-03-13 NOTE — PROGRESS NOTES
Overview Note:     1. Medtronic Viva XT CRT-D [id number], SN#: [id number], DDDR  ppm.      Atrial lead: BELLO 3372, RV Lead: MDT 3883  LV lead: MDT 9476        NYHA class 3 heart failure with reduced ejection fraction ([facility] 02/20/2015     Priority: Medium    CKD (chronic kidney disease) stage 3, GFR 30-59 ml/min ([facility]) 12/02/2014     Priority: Medium    Persistent atrial fibrillation ([facility] 07/12/2013     Priority: Medium     Overview Note:     1. Chronic OAC with coumadin.     [id number]: 5      Hematuria 02/06/2015    Diabetes mellitus type 2, uncontrolled ([address] 12/02/2014    Hyperlipidemia LDL goal < 100 07/12/2013    Coronary artery disease 05/29/2013       Past Medical History:   Diagnosis Date    Arthritis     CAD (coronary artery disease)     Cardiac defibrillator in place     CHF (congestive heart failure) ([facility]     Diabetes mellitus ([address]     Dribbling of urine     Ejection fraction < 50% 09/16/2016    25%-30%    Hematuria     HFrEF (heart failure with reduced ejection fraction) ([address] 05/12/2017 9/16/16- echo LVEF 25-30%, stage III DD, mild-moderate MR, mild pulmonary hypertension, AV mildly sclerotic, LVDD: 7.4 cm    History of blood transfusion 2015    here    [place] (hard of hearing)     Hyperlipidemia     Hypertension     Invasive carcinoma of urinary bladder ([address] 2/13/2015    HAD CHEMO / RADIATION TX    Ischemic cardiomyopathy     CHRISTINE treated with BiPAP     Oxygen dependent     Paroxysmal atrial fibrillation ([address]     Ventricular tachycardia ([address]        Past Surgical History:   Procedure Laterality Date    ABDOMEN SURGERY  2006    perforated bowel dr Kim Rooney DYSRHYTHMIC FOCUS  02/2017    dr Cristin Ingram  08/30/2017    Dr. Farmer- Bypass grafts patent   [address] Left 2010     dr Marylee Harris    CHOLECYSTECTOMY  2007    COLONOSCOPY      CORONARY ARTERY BYPASS GRAFT  2005    cabg dr Jackie Farah  x 2 valve is trileaflet.   The aortic valve appears mildly sclerotic.      Pulmonic Valve   Pulmonic valve is structurally normal.      Pericardial Effusion   No evidence of pericardial effusion.      Aorta   The aorta is within normal limits.      Conclusions      Summary   Ejection fraction is visually estimated at 25-30%.   Severe global hypokinesis   Stage III diastolic dysfunction   The left atrium is mild-moderately dilated.   Increased E point septal separation noted,suggesting decreased LV cardiac   output   Mild to moderate mitral regurgitation with central jet   The aortic valve appears mildly sclerotic.   Pulmonary hypertension is mild .      Signature      ----------------------------------------------------------------   Electronically signed by Halina Dejesus MD(Interpreting   physician) on 09/16/2016 08:45 PM   ----------------------------------------------------------------     M-Mode/2D Measurements & Calculations      LV Diastolic     LV Systolic Dimension: 6.9 cm   AV Cusp Separation: 1.6   Dimension: 7.4   LV Volume Diastolic: 826.4 ml   cmAO Root Dimension: 3.3   cm               LV Volume Systolic: 016.6 ml    cm   LV FS:6.8 %      LV EDV/LV EDV Index: 178. 5   LV PW Diastolic: RJ/93 SD/X^2DM ESV/LV ESV   1.1 cm           Index: 120.3 ml/56ml/ m^2   LV PW Systolic:  EF Calculated: 32.6 %           RV Diastolic Dimension:   3.4 cm           LV Mass Index: 186 l/min*m^2    2.9 cm   Septum   Diastolic: 1.1                                   LA/Aorta: 1.67   cm               LVOT: 2.2 cm                    Ascending Aorta: 3.3 cm   Septum Systolic:                                 LA volume/Index: 90.2 ml   1.2 cm                                           /42ml/m^2   CO: 4.93 l/min                                   RA Area: 16.5 cm^2   CI: 2.29 l/m*m^2   LV Mass: 399.55   g     Doppler Measurements & Calculations      MV Peak E-Wave: 1.57 AV Peak Velocity:     LVOT Peak Velocity: 0.8 m/s   m/s including its distal anastomosis.  The diagonal branch  after the distal anastomosis also is a very small caliber vessel. SVG TO LCX LATERAL BRANCH:  The saphenous vein graft to the left  circumflex.  Terminal lateral branch is  patent along its course  including its distal anastomosis.  The left circumflex lateral branch  also is patent after the distal anastomosis.  There was around 50%  luminal narrowing in the proximal segment of the vein graft.     The pressures were well measured in the left ventricle but no LV gram  was performed because of the patient's chronic kidney disease.     RIGHT FEMORAL ANGIOGRAPHY:  Right common femoral artery and the  proximal segment of the right superficial femoral artery and the right  profunda did not appear to have any significant disease.  Closure of  the exit site was performed with the Angio-Seal device with  achievement of adequate hemostasis.     The patient tolerated the procedure well and left the cardiac  catheterization pruitt in a stable condition.     CONCLUSIONS:  1.  Coronary artery disease. a.  Left main with no significant disease. b.  LAD occluded at the mid vessel level. c.  LCX mild-to-moderate disease in the proximal and mid vessel with  occluded terminal and lateral branch of the left circumflex. d. [employer] dominant vessel which is widely patent along its course  including the posterior descending artery branch.  No posterolateral  branch was noted on the injection over the right coronary artery. Roxanna Minion to LAD is patent. f.  SVG to LAD diagonal branch patent. G.  SVG to LCX marginal branch patent. 2.  Elevated left ventricular end-diastolic pressure consistent with  LV dysfunction.   3.  Closure of the right femoral artery access site with Angio-Seal  device.           Suyapa Chapin MD     D: 08/30/2017 15:41:18       T: 08/30/2017 17:44:46     [email or web address]  Job#: [id number]     Doc#: [id number]        Device Interrogation/Reprogramming  Make/Model Medtronic Viva XT. DOI 1/14/14  Mode VVIR 75/130 ppm  P wave: 3.8 mV  Impedance: 513 ohms   Threshold: 0.75 V @ 0.4 ms  RV R wave: Paced Impedance: 513 ohms   Threshold: 0.75 V @ 0.4 ms  LV:  Impedance: 760 ohms   Threshold:1.25 V @ 1.0 ms(LV tip to RV coil)  Pacing: A: 56%  RV: 97.6%  LV: 97.6%  Battery Voltage/Longevity: 18 months. Charge time:  9.2 secs  Arrhythmias: 1. NSVT.     Overall device function is normal  All device programmable settings were evaluated per above and in the scanned document, along with iterative adjustments (capture thresholds) to assess and select the most appropriate final programming to provide for consistent delivery of the appropriate therapy and to verify function of the device.         Impression:     1. VT  - H/O VT storm. - Mexiletine 250 mg TID    - On Toprol XL  200 mg BID  - intolerant to amio, \"failed\" tikosyn in past   - s/p AVJ ablation 1/20/2017       2. Persistent AF    - asymptomatic    - CHADSVASC= 6 (CHF, HTN, Age, DM, Vascular disease)    - Current anticoagulation includes: Coumadin  - Previously failed sotalol, Tikosyn and intolerant to amiodarone (pulmonary toxicity)    - TTE 9/16/2016: EF 25-30%, LA moderately dilated  - currently in sinus rhythm  - S/P AVJ ablation 1/20/2017      3. CAD  - S/p CABG 2005  - Last ischemia evaluation 9/2016: Hanna Gamboa nuclear stress  - follows with Dr. Randal Storm    - s/p C by Dr. Mihaela Veras above.      4. Chronic systolic CHF/ ischemic CMP with combined diastolic dysfunction   - NYHA III Stage C  - On GDMT: Toprol XL, Imdur/hydralazine  - TTE 9/16/16:  EF 25-30%. Severe global hypokinesis  - as per Cardiology      5. CRT-D in situ  - Former patient of Dr. Cuauhtemoc Zhang  - originally implanted 2007  - Upgrade to [address]  - Please note RV lead; Medtronic Sprint Pablo [id number] is on advisory (SIC = 0, lead impedence is stable)  - Normal function     6. CKD  - Cr 1.7 thought to be his baseline      7. HTN      8.  CHRISTINE  - compliant with CPAP at

## 2018-04-13 PROBLEM — G93.41 METABOLIC ENCEPHALOPATHY: Status: ACTIVE | Noted: 2018-01-01

## 2018-04-13 PROBLEM — Z45.02 AICD DISCHARGE: Status: RESOLVED | Noted: 2017-08-26 | Resolved: 2018-01-01

## 2018-04-13 PROBLEM — J96.20 ACUTE ON CHRONIC RESPIRATORY FAILURE (HCC): Status: RESOLVED | Noted: 2017-05-11 | Resolved: 2018-01-01

## 2018-04-13 PROBLEM — I50.42 CHRONIC COMBINED SYSTOLIC AND DIASTOLIC CONGESTIVE HEART FAILURE (HCC): Chronic | Status: ACTIVE | Noted: 2018-01-01

## 2018-04-16 PROBLEM — G93.41 METABOLIC ENCEPHALOPATHY: Status: RESOLVED | Noted: 2018-01-01 | Resolved: 2018-01-01

## 2018-04-16 PROBLEM — I10 HTN (HYPERTENSION), BENIGN: Chronic | Status: ACTIVE | Noted: 2018-01-01

## 2018-04-16 PROBLEM — I50.43 ACUTE ON CHRONIC COMBINED SYSTOLIC AND DIASTOLIC CHF (CONGESTIVE HEART FAILURE) (HCC): Chronic | Status: RESOLVED | Noted: 2017-02-23 | Resolved: 2018-01-01

## 2018-04-21 PROBLEM — R31.9 HEMATURIA: Status: ACTIVE | Noted: 2018-01-01

## 2018-04-22 PROBLEM — N17.9 ACUTE KIDNEY INJURY (HCC): Status: ACTIVE | Noted: 2018-01-01

## 2018-06-16 PROBLEM — R31.0 GROSS HEMATURIA: Status: ACTIVE | Noted: 2018-01-01

## 2018-06-16 NOTE — ED NOTES
Bed: 14  Expected date:   Expected time:   Means of arrival:   Comments:  Ems, geri Parnell RN  06/16/18 [id number]

## 2018-06-16 NOTE — ED PROVIDER NOTES
HPI:  6/16/18, Time: 3:05 PM         John Tipton is a 69 y.o. male presenting to the ED for hematuria, beginning couple of hours ago. The complaint has been persistent, moderate in severity, and worsened by nothing. Pt comes to the ED with his wife. Wife reports that pt had a stent placed by Dr. Humberto Thomas. Jay 8 days ago. Reports that he gets the stent replaced every 6 months. States that this time, he did not have any bleeding after, which usually happens. Pt reports that he started to have bloody urine from his penis a couple of hours ago. Reports that he also has dysuria and frequency. Reports that he is not sure if his bladder is emptying upon urinating. Pt is on coumadin. Wife reports that pt's INR last week was 1.2. Pt had his dose lowered to 3 mg from 5 mg recently after an abnormal INR. Patient denies fever/chills, cough, congestion, chest pain, shortness of breath, edema, headache, visual disturbances, focal paresthesias, focal weakness, abdominal pain, nausea, vomiting, diarrhea, constipation, trauma, neck or back pain or other complaints. ROS:   Pertinent positives and negatives are stated within HPI, all other systems reviewed and are negative.      --------------------------------------------- PAST HISTORY ---------------------------------------------  Past Medical History:  has a past medical history of Arthritis; CAD (coronary artery disease); Cardiac defibrillator in place; CHF (congestive heart failure) ([address] Dementia; Diabetes mellitus ([address] Dizziness; Dribbling of urine; Hematuria; HFrEF (heart failure with reduced ejection fraction) ([address] History of blood transfusion; [place] (hard of hearing); Hyperlipidemia; Hypertension; Invasive carcinoma of urinary bladder ([address] Ischemic cardiomyopathy; CHRISTINE treated with BiPAP; Oxygen dependent; Paroxysmal atrial fibrillation ([address] and Ventricular tachycardia ([address]     Past Surgical History:  has a past surgical history that includes Dilatation, ------------------------------ ED COURSE/MEDICAL DECISION MAKING----------------------  Medications   phenazopyridine (PYRIDIUM) tablet 100 mg (not administered)   cefepime (MAXIPIME) 2 g IVPB extended (mini-bag) (not administered)   0.9 % sodium chloride bolus (1,000 mLs Intravenous New Bag 6/16/18 9483)   HYDROcodone-acetaminophen (NORCO) 5-325 MG per tablet 1 tablet (1 tablet Oral Given 6/16/18 7041)           Procedures:  none      Medical Decision Making:    Patient comes to the ED with hematuria. Reports that he has constant drainage of hematuria. Has recent stent placed 8 days ago by Dr. Irina Lerma, which he had replace every 6 months. Pt reports that he also has dysuria. Pt is on coumadin which was recently decreased from 5 mg after having an abnormal INR. Had chávez placed in the ED and lab work drawn. Significant amount of dark thick blood in chávez. Irrigated but doesn't clear, lots of clot. Results were discussed with the patient and wife. This patient's ED course included: a personal history and physicial examination, re-evaluation prior to disposition and IV medications  This patient has remained hemodynamically stable during their ED course. Re-Evaluations:             Time: 5:42 PM  Re-evaluation. Patients symptoms are improving  Repeat physical examination is improved        Consultations:             Time: 5:35 pm.   Spoke with Dr. Rick Thompson (Urology). Discussed case. They will provide consultation and will admit the patient. Critical Care: none    Counseling: The emergency provider has spoken with the patient and discussed todays results, in addition to providing specific details for the plan of care and counseling regarding the diagnosis and prognosis. Questions are answered at this time and they are agreeable with the plan.       --------------------------------- IMPRESSION AND DISPOSITION ---------------------------------    IMPRESSION  1.  Hematuria, unspecified type DISPOSITION  Disposition: Admit to med/surg floor  Patient condition is stable    SCRIBE ATTESTATION  6/16/18, 3:06 PM.    This note is prepared by Rica Gillespie acting as Scribe for Cabrera Moeller DO. Cabrera Moeller DO:  The scribe's documentation has been prepared under my direction and personally reviewed by me in its entirety. I confirm that the note above accurately reflects all work, treatment, procedures, and medical decision making performed by me.                  Cabrera Moeller DO  06/16/18 174

## 2018-06-16 NOTE — PROGRESS NOTES
Upon waiting for transport, patient was screaming saying he needed irrigated. Wife stated this happens all the time at home. I irrigated the chávez and got back 5-6 extremely large clots.

## 2018-06-17 NOTE — PLAN OF CARE
Problem: Risk for Impaired Skin Integrity  Goal: Tissue integrity - skin and mucous membranes  Structural intactness and normal physiological function of skin and  mucous membranes.    Outcome: Met This Shift      Problem: Pain:  Goal: Control of acute pain  Control of acute pain   Outcome: Met This Shift      Problem: Falls - Risk of:  Goal: Will remain free from falls  Will remain free from falls   Outcome: Met This Shift

## 2018-06-17 NOTE — CONSULTS
Internal medicine      CHIEF COMPLAINT:  Hematuria      HISTORY OF PRESENT ILLNESS:      The patient is a 69 y.o. male patient of Dr Yaron Zhu who presents with hematuria. He has a history of atrial fibrillation and is on chronic anticoagulation. Recently his INR has been subtherapeutic. He had a stent placed by urology 8 days prior to admission. On the day of admission he developed gross hematuria associated with dysuria and frequency. He denies any fever or chills chest pain or palpitations. I was asked to see him for medical management. He does have an extensive cardiac history and has a cardiac defibrillator in place for an ischemic cardiomyopathy. He also suffers sleep apnea.  Currently is in pain free and continues with continuous  irrigation    Past Medical History:    Past Medical History:   Diagnosis Date    Arthritis     CAD (coronary artery disease)     Cardiac defibrillator in place     CHF (congestive heart failure) ([address]     Dementia     wife DPOA    Diabetes mellitus ([address]     Dizziness     uses walker    Dribbling of urine     Hematuria     HFrEF (heart failure with reduced ejection fraction) ([address] 05/12/2017 9/16/16- echo LVEF 25-30%, stage III DD, mild-moderate MR, mild pulmonary hypertension, AV mildly sclerotic, LVDD: 7.4 cm    History of blood transfusion 2015    here    [place] (hard of hearing)     Hypertension     Invasive carcinoma of urinary bladder ([address] 2/13/2015    HAD CHEMO / RADIATION TX    Ischemic cardiomyopathy     CHRISTINE treated with BiPAP     Oxygen dependent     3L/nc cont    Paroxysmal atrial fibrillation (HCC)     Ventricular tachycardia ([address]        Past Surgical History:    Past Surgical History:   Procedure Laterality Date    ABDOMEN SURGERY  2006    perforated bowel dr Aditi Colvin DYSRHYTHMIC FOCUS  02/2017    dr Wolf Kimbrough  08/30/2017    Dr. Darling Waterman- Bypass grafts patent   [address] Left 2010     dr Wallace Sam pacemaker/defib    CHOLECYSTECTOMY  2007    COLONOSCOPY      CORONARY ARTERY BYPASS GRAFT  2005    cabg dr Isaura Becker  x 2    CYSTOSCOPY  03/07/2016    with clot evacuation    CYSTOSCOPY Left 11/02/2016    Stent Exchange    CYSTOSCOPY Left 03/29/2017    cysto L stent change digital rectal exam     CYSTOSCOPY  07/10/2017    retrogrades,peylogram, left stent exchange    CYSTOSCOPY Left 12/11/2017    retrotgrade  left stent    DILATATION, ESOPHAGUS  2006    ECHO COMPL W DOP COLOR FLOW  7/14/2013         EYE SURGERY      gian lense implants    MI CYSTOURETHROSCOPY,URETER CATHETER Left 6/8/2018    CYSTOSCOPY RETROGRADE PYELOGRAM LEFT STENT CHANGE +++IV DYE ALLERGY+++ performed by Clementine Thompson,  at [place] [id number] TURP  1/20/16    URETER STENT PLACEMENT Left 07/08/2016       Medications Prior to Admission:    Prescriptions Prior to Admission: insulin glargine (LANTUS) 100 UNIT/ML injection vial, Inject 50 Units into the skin 2 times daily  warfarin (COUMADIN) 2.5 MG tablet, Take 3 mg by mouth daily Has an appointment with Dr Sreedhar Vogel 6/7/2018. Wife to obtain instructions if to hold.   vitamin D (CHOLECALCIFEROL) 1000 UNIT TABS tablet, Take 1,000 Units by mouth daily  metolazone (ZAROXOLYN) 2.5 MG tablet, Take 2.5 mg by mouth as needed 1 tablet q day for 3 days for excessive swelling and weight gain  aspirin 81 MG chewable tablet, Take 1 tablet by mouth daily  mexiletine (MEXITIL) 250 MG capsule, Take 1 capsule by mouth 3 times daily  insulin aspart (NOVOLOG) 100 UNIT/ML injection vial, Inject into the skin 3 times daily (before meals) Sliding scale with each meal  metoprolol succinate (TOPROL XL) 200 MG extended release tablet, Take 1 tablet by mouth 2 times daily  bumetanide (BUMEX) 2 MG tablet, Take 1 tablet by mouth 2 times daily in the am  potassium chloride (KLOR-CON M) 20 MEQ extended release tablet, Take 20 mEq by mouth 2 times daily  allopurinol (ZYLOPRIM) 300 MG tablet, Take 31.21 kg/m²     General appearance: alert, appears stated age and cooperative  Head: Normocephalic, without obvious abnormality, atraumatic  Eyes: conjunctivae/corneas clear. PERRL, EOM's intact. Fundi benign. Ears: normal TM's and external ear canals both ears  Nose: Nares normal. Septum midline. Mucosa normal. No drainage or sinus tenderness.   Throat: lips, mucosa, and tongue normal; teeth and gums normal  Neck: no adenopathy, no carotid bruit, no JVD, supple, symmetrical, trachea midline and thyroid not enlarged, symmetric, no tenderness/mass/nodules  Lungs: clear to auscultation bilaterally  Heart: irregularly irregular rhythm and no S3 or S4  Abdomen: soft, non-tender; bowel sounds normal; no masses,  no organomegaly  Extremities: extremities normal, atraumatic, no cyanosis or edema  Pulses: 2+ and symmetric  Skin: Skin color, texture, turgor normal. No rashes or lesions  Neurologic: Grossly normal    LABS:    CBC with Differential:    Lab Results   Component Value Date    WBC 16.9 06/17/2018    RBC 3.26 06/17/2018    HGB 9.8 06/17/2018    HCT 30.9 06/17/2018     06/17/2018    MCV 94.8 06/17/2018    MCH 30.1 06/17/2018    MCHC 31.7 06/17/2018    RDW 15.7 06/17/2018    SEGSPCT 72 07/15/2013    BANDSPCT 3 03/18/2016    LYMPHOPCT 12.0 06/16/2018    MONOPCT 9.1 06/16/2018    MYELOPCT 5 05/28/2015    BASOPCT 0.4 06/16/2018    MONOSABS 1.15 06/16/2018    LYMPHSABS 1.51 06/16/2018    EOSABS 1.36 06/16/2018    BASOSABS 0.05 06/16/2018     CMP:    Lab Results   Component Value Date     06/17/2018    K 4.8 06/17/2018     06/17/2018    CO2 25 06/17/2018    BUN 34 06/17/2018    CREATININE 1.9 06/17/2018    GFRAA 42 06/17/2018    LABGLOM 35 06/17/2018    GLUCOSE 173 06/17/2018    PROT 5.6 06/17/2018    LABALBU 2.9 06/17/2018    CALCIUM 8.5 06/17/2018    BILITOT 0.4 06/17/2018    ALKPHOS 66 06/17/2018    AST 25 06/17/2018    ALT 14 06/17/2018     Hepatic Function Panel:    Lab Results   Component Value Date    ALKPHOS 66 06/17/2018    ALT 14 06/17/2018    AST 25 06/17/2018    PROT 5.6 06/17/2018    BILITOT 0.4 06/17/2018    BILIDIR <0.2 08/01/2014    IBILI 0.1 08/01/2014    LABALBU 2.9 06/17/2018     PT/INR:    Lab Results   Component Value Date    PROTIME 19.0 06/17/2018    INR 1.7 06/17/2018     PTT:    Lab Results   Component Value Date    APTT 36.1 11/13/2017   [APTT}  Troponin:    Lab Results   Component Value Date    TROPONINI 0.02 04/13/2018     U/A:    Lab Results   Component Value Date    COLORU BLOODY 06/16/2018    PROTEINU >=300 06/16/2018    PHUR 7.0 06/16/2018    WBCUA >20 06/16/2018    RBCUA PACKED 06/16/2018    YEAST MODERATE 04/28/2018    BACTERIA FEW 06/16/2018    CLARITYU TURBID 06/16/2018    SPECGRAV 1.020 06/16/2018    LEUKOCYTESUR SMALL 06/16/2018    UROBILINOGEN 0.2 06/16/2018    BILIRUBINUR Negative 06/16/2018    BLOODU LARGE 06/16/2018    GLUCOSEU Negative 06/16/2018         Problem list:    Patient Active Problem List   Diagnosis    Coronary artery disease involving native coronary artery of native heart without angina pectoris    Biventricular implantable cardioverter-defibrillator in situ    Hyperlipidemia with target LDL less than 100    P[facility]    Diabetes mellitus type 2, uncontrolled ([facility]    CKD (chronic kidney disease) stage 3, GFR 30-59 ml/min (HCC)    Invasive carcinoma of urinary bladder (HCC)    Benign prostatic hyperplasia    Chronic combined systolic and diastolic congestive heart failure ([facility]    HTN (hypertension), benign    Hematuria    Acute kidney injury ([facility]    Gross hematuria         ASSESSMENT:             Coronary artery disease involving native coronary artery of native heart without angina pectoris    Biventricular implantable cardioverter-defibrillator in situ    Hyperlipidemia with target LDL less than 100    Permanent atrial fibrillation ([facility]    Diabetes mellitus type 2, uncontrolled ([facility]    CKD (chronic kidney

## 2018-06-17 NOTE — PROGRESS NOTES
Pt. Called nurse to room due to fluid leaking around chávez cath site. Irrigated pt. Chávez catheter and several clots noted. Urine light pink color at this time. No leaking around site. Pt. Given B&O suppository. Will continue to monitor.      Electronically signed by Beto Casiano RN on 6/17/2018 at 6:45 PM

## 2018-06-17 NOTE — PROGRESS NOTES
Irrigated pt. Gray catheter. Cherry colored drainage with several large clots noted.     Electronically signed by Frandy Lynn RN on 6/17/2018 at 9:06 AM

## 2018-06-17 NOTE — H&P
tablet, Take 1 tablet by mouth every 8 hours. azelastine (ASTELIN) 0.1 % nasal spray, 1 spray by Nasal route as needed   OXYGEN, Inhale 3 L/min into the lungs    Allergies:    Multaq [dronedarone]; Amiodarone; Bactrim [sulfamethoxazole-trimethoprim]; Iv dye [iodides]; Pollen extract; Tikosyn [dofetilide]; and Doxycycline    Social History:    reports that he quit smoking about 41 years ago. His smoking use included Cigarettes. He has a 37.50 pack-year smoking history. He has never used smokeless tobacco. He reports that he does not drink alcohol or use drugs. Family History:   Non-contributory to this urological problem  family history includes Alzheimer's Disease in his mother; Diabetes in his father; Heart Disease in his father. Review of Systems:  Respiratory: negative for cough and hemoptysis  Cardiovascular: negative for chest pain and dyspnea  Gastrointestinal: negative for abdominal pain, diarrhea, nausea and vomiting  Derm: negative for rash and skin lesion(s)  Neurological: negative for seizures and tremors  Endocrine: negative for diabetic symptoms including polydipsia and polyuria  : As above in the HPI, otherwise negative  All other reviews are negative    Physical Exam:   Vitals: BP (!) 142/73   Pulse 88   Temp 97.7 °F (36.5 °C) (Oral)   Resp 20   Ht 5' 9\" (1.753 m)   Wt 211 lb (95.7 kg)   SpO2 97%   BMI 31.16 kg/m²   General:  Awake, alert, oriented X 3. Well developed, well nourished, well groomed. No apparent distress. HEENT:  Normocephalic, atraumatic. Pupils equal, round. No scleral icterus. No conjunctival injection. Normal lips, teeth, and gums. No nasal discharge. Neck:  Supple, no masses. Heart:  RRR  Lungs:  No audible wheezing. Respirations symmetric and non-labored.   Abdomen:  soft, nontender, no masses, no organomegaly, no peritoneal signs  Extremities:  No clubbing, cyanosis, or edema  Skin:  Warm and dry, no open lesions or rashes  Neuro:  Cranial nerves 2-12

## 2018-06-17 NOTE — PROGRESS NOTES
Irrigated pt. Gray catheter. Light cherry colored drainage with several large clots noted.     Electronically signed by Marko Figueroa RN on 6/17/2018 at 5:17 PM

## 2018-06-18 NOTE — PLAN OF CARE
Problem: Nutrition  Goal: Optimal nutrition therapy  Outcome: Ongoing  Nutrition Problem: Increased nutrient needs  Intervention: Food and/or Nutrient Delivery: Continue current diet, Start ONS (Ensure HP BID)  Nutritional Goals: Consume >75% meals

## 2018-06-18 NOTE — PROGRESS NOTES
[facility] PROGRESS NOTE                                                                       6/18/2018        CHIEF UROLOGIC COMPLAINT: Gross hematuria   HISTORY OF PRESENT ILLNESS:  Patient without new complaints. Low blood sugar overnight. No pain. SOB.      REVIEW OF SYSTEMS:   CONSTITUTIONAL: negative  HEENT: negative  HEMATOLOGIC: negative  ENDOCRINE: hypoglycemia  RESPIRATORY: SOB  CV: negative  GI: negative  NEURO: negative  ORTHOPEDICS: negative  PSYCHIATRIC: negative  : as above    PAST FAMILY HISTORY:    Family History   Problem Relation Age of Onset    Diabetes Father     Heart Disease Father     Alzheimer's Disease Mother      PAST SOCIAL HISTORY:    Social History     Social History    Marital status:      Spouse name: N/A    Number of children: N/A    Years of education: N/A     Social History Main Topics    Smoking status: Former Smoker     Packs/day: 2.50     Years: 15.00     Types: Cigarettes     Quit date: 7/8/1976    Smokeless tobacco: Never Used      Comment: stop 1976    Alcohol use No    Drug use: No    Sexual activity: Not Asked     Other Topics Concern    None     Social History Narrative    None       Scheduled Meds:   sodium chloride flush  10 mL Intravenous 2 times per day    allopurinol  300 mg Oral Daily    bumetanide  2 mg Oral BID    hydrALAZINE  25 mg Oral 3 times per day    insulin glargine  50 Units Subcutaneous BID    isosorbide mononitrate  60 mg Oral BID    metoprolol succinate  200 mg Oral BID    mexiletine  250 mg Oral TID    potassium chloride  20 mEq Oral BID    vitamin D  1,000 Units Oral Daily    insulin lispro  0-6 Units Subcutaneous TID WC    insulin lispro  0-3 Units Subcutaneous Nightly    sodium chloride flush  10 mL Intravenous 2 times per day     Continuous Infusions:   dextrose       PRN Meds:.sodium chloride flush, morphine, glucose, dextrose, glucagon (rDNA), dextrose, sodium chloride flush, belladonna-opium    BP (!) 108/52   Pulse 75   Temp 97.8 °F (36.6 °C) (Oral)   Resp 18   Ht 5' 9\" (1.753 m)   Wt 212 lb (96.2 kg)   SpO2 98%   BMI 31.31 kg/m²     Lab Results   Component Value Date    WBC 16.9 (H) 06/17/2018    HGB 9.8 (L) 06/17/2018    HCT 30.9 (L) 06/17/2018    MCV 94.8 06/17/2018     06/17/2018       Lab Results   Component Value Date    CREATININE 1.9 (H) 06/17/2018       Lab Results   Component Value Date    PSA 0.93 02/11/2015       Recent Labs      06/16/18   1757   LABURIN  Growth not present, incubation continues       Recent Labs      06/16/18   1803   BC  24 Hours- no growth       Recent Labs      06/16/18   1803   BLOODCULT2  24 Hours- no growth       PHYSICAL EXAMINATION:  Skin dry, without rashes  Respirations non-labored, intact  Abdomen soft, non-tender, non-distended  Alert and oriented x3  Gray draining clear urine      ASSESSMENT AND PLAN:  1. Gross hematuria. Stop CBI. Will follow urine color without CBI. Issues also medical ( pulmonary, diabetes ) related at this time.       Albert Faye M.D.  6/18/2018  7:16 AM

## 2018-06-18 NOTE — PROGRESS NOTES
Subjective:    Awake and alert. Low blood glucose overnight. Denies chest pain or dyspnea. Denies abdominal pain. Tolerating diet. No nausea or vomiting. Objective:    BP (!) 129/54   Pulse 79   Temp 97.7 °F (36.5 °C)   Resp 16   Ht 5' 9\" (1.753 m)   Wt 212 lb (96.2 kg)   SpO2 97%   BMI 31.31 kg/m²   Skin: Warm and dry, no lesions lower extremities dressed, skin very friable  Neck: Supple, no JVD  Heart:  RRR, no murmurs, gallops, or rubs. Lungs:  CTA bilaterally, no wheeze, rales or rhonchi  Abd: bowel sounds present, nontender, nondistended, no masses  Extrem:  No clubbing, cyanosis, or edema, pulses intact    I/O last 3 completed shifts: In: 1440 [P.O.:1440]  Out: 44527 [Urine:57281]    Laboratory:     CBC with Differential:    Lab Results   Component Value Date    WBC 16.9 06/17/2018    RBC 3.26 06/17/2018    HGB 9.8 06/17/2018    HCT 30.9 06/17/2018     06/17/2018    MCV 94.8 06/17/2018    MCH 30.1 06/17/2018    MCHC 31.7 06/17/2018    RDW 15.7 06/17/2018    SEGSPCT 72 07/15/2013    BANDSPCT 3 03/18/2016    LYMPHOPCT 12.0 06/16/2018    MONOPCT 9.1 06/16/2018    MYELOPCT 5 05/28/2015    BASOPCT 0.4 06/16/2018    MONOSABS 1.15 06/16/2018    LYMPHSABS 1.51 06/16/2018    EOSABS 1.36 06/16/2018    BASOSABS 0.05 06/16/2018     CMP:    Lab Results   Component Value Date     06/17/2018    K 4.8 06/17/2018     06/17/2018    CO2 25 06/17/2018    BUN 34 06/17/2018    CREATININE 1.9 06/17/2018    GFRAA 42 06/17/2018    LABGLOM 35 06/17/2018    GLUCOSE 173 06/17/2018    PROT 5.6 06/17/2018    LABALBU 2.9 06/17/2018    CALCIUM 8.5 06/17/2018    BILITOT 0.4 06/17/2018    ALKPHOS 66 06/17/2018    AST 25 06/17/2018    ALT 14 06/17/2018    Results for Hal Castañeda (MRN [id number]) as of 6/18/2018 12:42   Ref.  Range 6/16/2018 18:02   Glucose Latest Ref Range: 74 - 109 mg/dL 75       Current Facility-Administered Medications   Medication Dose Route Frequency Provider Last Rate Last Dose    sodium chloride flush 0.9 % injection 10 mL  10 mL Intravenous 2 times per day Jono A Jay, DO   10 mL at 06/16/18 2025    sodium chloride flush 0.9 % injection 10 mL  10 mL Intravenous PRN Jono A Jay, DO        morphine injection 2 mg  2 mg Intravenous Q6H PRN Jono A Jay, DO   2 mg at 06/17/18 1418    allopurinol (ZYLOPRIM) tablet 300 mg  300 mg Oral Daily Jono A Jay, DO   300 mg at 06/18/18 1009    bumetanide (BUMEX) tablet 2 mg  2 mg Oral BID Jono A Jay, DO   2 mg at 06/17/18 2028    hydrALAZINE (APRESOLINE) tablet 25 mg  25 mg Oral 3 times per day Perez Weldon A Jay, DO   Stopped at 06/18/18 1964    isosorbide mononitrate (IMDUR) extended release tablet 60 mg  60 mg Oral BID Jono A Jay, DO   60 mg at 06/17/18 2028    metoprolol succinate (TOPROL XL) extended release tablet 200 mg  200 mg Oral BID Jono A Jay, DO   200 mg at 06/17/18 2028    mexiletine (MEXITIL) capsule 250 mg  250 mg Oral TID Jono A Jay, DO   250 mg at 06/18/18 1010    potassium chloride (KLOR-CON M) extended release tablet 20 mEq  20 mEq Oral BID Jono A Jay, DO   20 mEq at 06/18/18 1010    vitamin D (CHOLECALCIFEROL) tablet 1,000 Units  1,000 Units Oral Daily Jono A Jay, DO   1,000 Units at 06/18/18 1010    glucose (GLUTOSE) 40 % oral gel 15 g  15 g Oral PRN Jono A Jay, DO   15 g at 06/18/18 0640    dextrose 50 % solution 12.5 g  12.5 g Intravenous PRN Jono A Jay, DO        glucagon (rDNA) injection 1 mg  1 mg Intramuscular PRN Jono A Jay, DO        dextrose 5 % solution  100 mL/hr Intravenous PRN Jono A Jay, DO        insulin lispro (HUMALOG) injection vial 0-6 Units  0-6 Units Subcutaneous TID WC Douglas Shows, DO   2 Units at 06/18/18 1141    sodium chloride flush 0.9 % injection 10 mL  10 mL Intravenous 2 times per day Jono A Jay, DO   10 mL at 06/18/18 1009    sodium chloride flush 0.9 % injection 10 mL  10 mL Intravenous PRN Jono A Jay, DO   10 mL at 06/17/18 1418    belladonna-opium (B&O SUPPRETTES) 16.2-30 MG suppository 60 mg  60 mg Rectal Q8H PRN Jono Thompson DO   60 mg at 06/18/18 1038       Problem list:    Patient Active Problem List   Diagnosis    Coronary artery disease involving native coronary artery of native heart without angina pectoris    Biventricular implantable cardioverter-defibrillator in situ    Hyperlipidemia with target LDL less than 100    Permanent atrial fibrillation ([address]    Diabetes mellitus type 2, uncontrolled ([address]    CKD (chronic kidney disease) stage 3, GFR 30-59 ml/min (HCC)    Invasive carcinoma of urinary bladder (HCC)    Benign prostatic hyperplasia    Chronic combined systolic and diastolic congestive heart failure ([address]    HTN (hypertension), benign    Hematuria    Acute kidney injury ([address]    Gross hematuria       Assessment:     Coronary artery disease involving native coronary artery of native heart without angina pectoris    Biventricular implantable cardioverter-defibrillator in situ    Hyperlipidemia with target LDL less than 100    Permanent atrial fibrillation ([address]    Diabetes mellitus type 2, uncontrolled (HCC)    CKD (chronic kidney disease) stage 3, GFR 30-59 ml/min (HCC)    Invasive carcinoma of urinary bladder (HCC)    Benign prostatic hyperplasia    Chronic combined systolic and diastolic congestive heart failure (HCC)    HTN (hypertension), benign    Hematuria    Acute kidney injury ([address]    Gross hematuria          Plan:    1. Stop bedtime NovoLog coverage    2.  Decrease Jelena Hyman D.O., Dave Aviles  12:44 PM  6/18/2018

## 2018-06-18 NOTE — PROGRESS NOTES
kcals/kg)  · Estimated Daily Protein (g):     Estimated Intake vs Estimated Needs: Intake Meets Needs    Nutrition Risk Level: Low    Nutrition Interventions:   Continue current diet, Start ONS (Ensure HP BID)  Continued Inpatient Monitoring, Education not appropriate at this time    Nutrition Evaluation:   · Evaluation: Goals set   · Goals: Consume >75% meals    · Monitoring: Meal Intake, Diet Tolerance, Skin Integrity, Wound Healing, Fluid Balance, Ascites/Edema, Weight, Comparative Standards, Pertinent Labs    See Adult Nutrition Doc Flowsheet for more detail.      Electronically signed by Danetta Fabry, RD, LD on 6/18/18 at 3:36 PM    Contact Number: [phone]

## 2018-06-19 NOTE — PROGRESS NOTES
Subjective:    Awake and alert. During clearing  Denies chest pain or dyspnea. Denies abdominal pain. Tolerating diet. No nausea or vomiting. Objective:    /60   Pulse 75   Temp 98.8 °F (37.1 °C) (Oral)   Resp 16   Ht 5' 9\" (1.753 m)   Wt 227 lb 8 oz (103.2 kg)   SpO2 99%   BMI 33.60 kg/m²   Skin: Warm and dry  Neck: Supple, no JVD  Heart:  RRR, no murmurs, gallops, or rubs. Lungs:  CTA bilaterally, no wheeze, rales or rhonchi  Abd: bowel sounds present, nontender, nondistended, no masses  Extrem:  No clubbing, cyanosis, or edema, pulses intact    I/O last 3 completed shifts:   In: 1320 [P.O.:1320]  Out: 9625 [Urine:9625]    Laboratory:     CBC with Differential:    Lab Results   Component Value Date    WBC 13.2 06/19/2018    RBC 2.84 06/19/2018    HGB 8.4 06/19/2018    HCT 26.8 06/19/2018     06/19/2018    MCV 94.4 06/19/2018    MCH 29.6 06/19/2018    MCHC 31.3 06/19/2018    RDW 15.7 06/19/2018    SEGSPCT 72 07/15/2013    BANDSPCT 3 03/18/2016    LYMPHOPCT 12.0 06/16/2018    MONOPCT 9.1 06/16/2018    MYELOPCT 5 05/28/2015    BASOPCT 0.4 06/16/2018    MONOSABS 1.15 06/16/2018    LYMPHSABS 1.51 06/16/2018    EOSABS 1.36 06/16/2018    BASOSABS 0.05 06/16/2018     BMP:    Lab Results   Component Value Date     06/19/2018    K 4.1 06/19/2018     06/19/2018    CO2 26 06/19/2018    BUN 30 06/19/2018    LABALBU 2.9 06/17/2018    CREATININE 1.6 06/19/2018    CALCIUM 8.6 06/19/2018    GFRAA 51 06/19/2018    LABGLOM 42 06/19/2018    GLUCOSE 81 06/19/2018     PT/INR:    Lab Results   Component Value Date    PROTIME 14.0 06/19/2018    INR 1.2 06/19/2018        Current Facility-Administered Medications   Medication Dose Route Frequency Provider Last Rate Last Dose    insulin glargine (LANTUS) injection vial 25 Units  25 Units Subcutaneous Nightly Goyo Living, DO   25 Units at 06/18/18 8380    mineral oil-hydrophilic petrolatum (AQUAPHOR) ointment   Topical BID Goyo Living, DO (B&O SUPPRETTES) 16.2-30 MG suppository 60 mg  60 mg Rectal Q8H PRN Jono PETTIT Jay, DO   60 mg at 06/18/18 1038       Problem list:    Patient Active Problem List   Diagnosis    Coronary artery disease involving native coronary artery of native heart without angina pectoris    Biventricular implantable cardioverter-defibrillator in situ    Hyperlipidemia with target LDL less than 100    Permanent atrial fibrillation ([address]    Diabetes mellitus type 2, uncontrolled ([address]    CKD (chronic kidney disease) stage 3, GFR 30-59 ml/min (HCC)    Invasive carcinoma of urinary bladder (HCC)    Benign prostatic hyperplasia    Chronic combined systolic and diastolic congestive heart failure ([address]    HTN (hypertension), benign    Hematuria    Acute kidney injury ([address]    Gross hematuria       Assessment:     Coronary artery disease involving native coronary artery of native heart without angina pectoris    Biventricular implantable cardioverter-defibrillator in situ    Hyperlipidemia with target LDL less than 100    Permanent atrial fibrillation ([address]    Diabetes mellitus type 2, uncontrolled (HCC)    CKD (chronic kidney disease) stage 3, GFR 30-59 ml/min (HCC)    Invasive carcinoma of urinary bladder (HCC)    Benign prostatic hyperplasia    Chronic combined systolic and diastolic congestive heart failure (HCC)    HTN (hypertension), benign    Hematuria    Acute kidney injury ([address]    Gross hematuria             Plan:    1. Continue current diabetic regimen    2.  Discharge at the discretion of urology      Ion Aggarwal D.O., [facility]  11:50 AM  6/19/2018

## 2018-06-19 NOTE — PROGRESS NOTES
[facility]                                                            PROGRESS NOTE                                                                       6/19/2018          SUBJECTIVE:    cbi removed  Urine cleareing  Pt feels well  Creatinine  1.6 hb 8.4    OBJECTIVE:    /60   Pulse 75   Temp 98.8 °F (37.1 °C) (Oral)   Resp 16   Ht 5' 9\" (1.753 m)   Wt 227 lb 8 oz (103.2 kg)   SpO2 99%   BMI 33.60 kg/m²     PHYSICAL EXAMINATION:  Skin: dry, without rashes  Respirations: non-labored, intact  Abdomen: soft, non-tender, non-distended      Lab Results   Component Value Date    WBC 13.2 (H) 06/19/2018    HGB 8.4 (L) 06/19/2018    HCT 26.8 (L) 06/19/2018    MCV 94.4 06/19/2018     06/19/2018       Lab Results   Component Value Date    CREATININE 1.6 (H) 06/19/2018       Lab Results   Component Value Date    PSA 0.93 02/11/2015       REVIEW OF SYSTEMS:    CONSTITUTIONAL: negative  HEENT: negative  HEMATOLOGIC: negative  ENDOCRINE: negative  RESPIRATORY: negative  CV: negative  GI: negative  NEURO: negative  ORTHOPEDICS: negative  PSYCHIATRIC: negative  : as above    PAST FAMILY HISTORY:    Family History   Problem Relation Age of Onset    Diabetes Father     Heart Disease Father     Alzheimer's Disease Mother      PAST SOCIAL HISTORY:    Social History     Social History    Marital status:      Spouse name: N/A    Number of children: N/A    Years of education: N/A     Social History Main Topics    Smoking status: Former Smoker     Packs/day: 2.50     Years: 15.00     Types: Cigarettes     Quit date: 7/8/1976    Smokeless tobacco: Never Used      Comment: stop 1976    Alcohol use No    Drug use: No    Sexual activity: Not Asked     Other Topics Concern    None     Social History Narrative    None       Scheduled Meds:   insulin glargine  25 Units Subcutaneous Nightly    mineral oil-hydrophilic petrolatum   Topical BID   

## 2018-06-20 NOTE — PROGRESS NOTES
6/20/2018 8:08 AM  Service: Urology  Group: [facility] ([facility])    Romie Cruz  [id number]    Chief urologic compliant: gross hematuria  HPI:  Patient is doing ok  He does not have pain  His urine is clear    Review of Systems:  Respiratory: negative for cough and hemoptysis  Cardiovascular: negative for chest pain and dyspnea  Gastrointestinal: negative for abdominal pain, diarrhea, nausea and vomiting  Derm: negative for rash and skin lesion(s)  Neurological: negative for seizures and tremors  Endocrine: negative for diabetic symptoms including polydipsia and polyuria  : As above in the HPI, otherwise negative  All other reviews are negative     Allergies: Multaq [dronedarone]; Amiodarone; Bactrim [sulfamethoxazole-trimethoprim]; Iv dye [iodides]; Pollen extract;  Tikosyn [dofetilide]; and Doxycycline    Objective:   Vitals:    06/20/18 0715   BP: (!) 110/58   Pulse: 76   Resp: 16   Temp: 98.7 °F (37.1 °C)   SpO2: 96%       Neuro: A/A/O x3  Respiratory: non labored breathing  ABD: soft non-tender, non-distended  : chávez clear  Ext: no clubbing, cyanosis, edema    Labs:   Recent Labs      06/19/18   0325   WBC  13.2*   RBC  2.84*   HGB  8.4*   HCT  26.8*   MCV  94.4   MCH  29.6   MCHC  31.3*   RDW  15.7*   PLT  203   MPV  12.7*       Recent Labs      06/19/18   0325   CREATININE  1.6*       Assessment: Romie Cruz 69 y.o. male     POD 5 left stent exchange for left hydronephrosis  Gross hematuria resolved  Bladder cancer post XRT and chemo      Plan:    All options were discussed  He does not have any family present  Will DC home today  Voiding trial in the office next week  FU in the office in 2-4 weeks      DO [facility]  Urology

## 2018-06-20 NOTE — DISCHARGE SUMMARY
Physician Discharge Summary     Patient ID:  Samantha Juarez  [id number]  60 y.o.  1940    Admit date: 6/16/2018    Discharge date and time: 6/20/2018    Admitting Physician: Tho Thompson DO     Admission Diagnoses: Hematuria [R31.9]  Hematuria [R31.9]  Gross hematuria [R31.0]    Discharge Diagnoses: [address] Course:  UNEVENTFUL    Treatments: chávez     Disposition: home    Patient Instructions:   Current Discharge Medication List      CONTINUE these medications which have NOT CHANGED    Details   insulin glargine (LANTUS) 100 UNIT/ML injection vial Inject 50 Units into the skin 2 times daily      warfarin (COUMADIN) 2.5 MG tablet Take 3 mg by mouth daily Has an appointment with Dr Fay Matias 6/7/2018. Wife to obtain instructions if to hold. vitamin D (CHOLECALCIFEROL) 1000 UNIT TABS tablet Take 1,000 Units by mouth daily      metolazone (ZAROXOLYN) 2.5 MG tablet Take 2.5 mg by mouth as needed 1 tablet q day for 3 days for excessive swelling and weight gain      aspirin 81 MG chewable tablet Take 1 tablet by mouth daily  Qty: 30 tablet, Refills: 0      mexiletine (MEXITIL) 250 MG capsule Take 1 capsule by mouth 3 times daily  Qty: 270 capsule, Refills: 3      insulin aspart (NOVOLOG) 100 UNIT/ML injection vial Inject into the skin 3 times daily (before meals) Sliding scale with each meal      metoprolol succinate (TOPROL XL) 200 MG extended release tablet Take 1 tablet by mouth 2 times daily  Qty: 60 tablet, Refills: 3      bumetanide (BUMEX) 2 MG tablet Take 1 tablet by mouth 2 times daily in the am  Qty: 60 tablet, Refills: 3      potassium chloride (KLOR-CON M) 20 MEQ extended release tablet Take 20 mEq by mouth 2 times daily      allopurinol (ZYLOPRIM) 300 MG tablet Take 300 mg by mouth daily       isosorbide mononitrate (IMDUR) 60 MG CR tablet Take 60 mg by mouth 2 times daily       hydrALAZINE (APRESOLINE) 25 MG tablet Take 1 tablet by mouth every 8 hours.   Qty: 90 tablet, Refills: 0 azelastine (ASTELIN) 0.1 % nasal spray 1 spray by Nasal route as needed       OXYGEN Inhale 3 L/min into the lungs               Signed:  Claudia Duke Memo, DO  6/20/2018  8:10 AM     The gross hematuria is from his bladder cancer, XRT, stent, and anticogulation

## 2018-06-20 NOTE — PLAN OF CARE
Problem: Pain:  Goal: Control of acute pain  Control of acute pain   Outcome: Met This Shift      Problem: Bleeding:  Goal: Will show no signs and symptoms of excessive bleeding  Will show no signs and symptoms of excessive bleeding   Outcome: Met This Shift      Problem: Falls - Risk of:  Goal: Will remain free from falls  Will remain free from falls   Outcome: Met This Shift

## 2018-08-07 NOTE — TELEPHONE ENCOUNTER
Reviewed carelink:   Real time: AP/BP @ 75  65 NSVT: 8-13 beats ( 2-7 seconds)  most recent on 8. 6.2018   1 AT/AF 66 seconds: on 6.19.2018. Optivol above threshold but slowly coming down. Spoke with Mr Tia Payton on 8/7/2018 @1141. He can not hear me. I will call back in an hour to talk with the wife.      Sobeida Paul

## 2018-08-07 NOTE — TELEPHONE ENCOUNTER
Patient's wife called in stating that her  has been very tired, sleeps all day, is very weak could only stand for a few seconds then needs to sit down. I advised her to send a transmission, also let her know we would call her back once we received the transmission. Patient's wife verbalized understanding.

## 2018-08-08 NOTE — TELEPHONE ENCOUNTER
Unfortunately I think these symptoms are progression of his cardiac disease and his underlying dementia. If his BP is stable I would change any of his cardiac medications though. Thanks.     ALK

## 2018-09-04 NOTE — PROGRESS NOTES
OUTPATIENT FOLLOW-UP    Date of Service: 9/4/2018    Chief complaint: Follow-up for chronic systolic/diastolic CHF, ICM, CAD s/p CABG    Interim History:  Hospitalized in 8/2017 for evaluation/treatment of VT storm (results of cardiac work-up outlined below; mexiletine dose increased) and in 11/2017 for ICD shock. No chest pain or worsening SOB (on chronic O2) at a reduced functional capacity. LE improved. +fatigue. Prior hospitalizations for evaluation of hematuria (improved, now back on coumadin). Paced rhythm on EKG.     Review of systems:  Cardiac: As per HPI  General: No fever, chills  Pulmonary: As per HPI  GI: No nausea, vomiting  Musculoskeletal: KRUEGER x 4, no focal motor deficits  Skin: Intact, +stasis dermatitis changes  Neuro/Psych: No headache or seizures      Patient Active Problem List   Diagnosis    Coronary artery disease involving native coronary artery of native heart without angina pectoris    Biventricular implantable cardioverter-defibrillator in situ    Hyperlipidemia with target LDL less than 100    Permanent atrial fibrillation (HCC)    Diabetes mellitus type 2, uncontrolled (HCC)    CKD (chronic kidney disease) stage 3, GFR 30-59 ml/min (HCC)    Invasive carcinoma of urinary bladder (HCC)    Benign prostatic hyperplasia    Chronic combined systolic and diastolic congestive heart failure (HCC)    HTN (hypertension), benign    Hematuria    Acute kidney injury ([facility] [address]    Gross hematuria       Allergies   Allergen Reactions    Multaq [Dronedarone] Shortness Of Breath    Amiodarone Other (See Comments)     Difficulty breathing    Bactrim [Sulfamethoxazole-Trimethoprim] Itching    Iv Dye [Iodides] Hives and Itching    Pollen Extract Other (See Comments)     Seasonal allergies    Tikosyn [Dofetilide] Diarrhea and Nausea And Vomiting    Doxycycline Rash       Current Outpatient Prescriptions   Medication Sig Dispense Refill    mexiletine (MEXITIL) 250 MG capsule TAKE 1 CAPSULE THREE TIMES DAILY 270 capsule 3    insulin glargine (LANTUS) 100 UNIT/ML injection vial Inject 50 Units into the skin 2 times daily      warfarin (COUMADIN) 2.5 MG tablet Take 3 mg by mouth daily Has an appointment with Dr Brennen Torres 6/7/2018. Wife to obtain instructions if to hold.  vitamin D (CHOLECALCIFEROL) 1000 UNIT TABS tablet Take 1,000 Units by mouth daily      metolazone (ZAROXOLYN) 2.5 MG tablet Take 2.5 mg by mouth as needed 1 tablet q day for 3 days for excessive swelling and weight gain      aspirin 81 MG chewable tablet Take 1 tablet by mouth daily 30 tablet 0    insulin aspart (NOVOLOG) 100 UNIT/ML injection vial Inject into the skin 3 times daily (before meals) Sliding scale with each meal      metoprolol succinate (TOPROL XL) 200 MG extended release tablet Take 1 tablet by mouth 2 times daily 60 tablet 3    bumetanide (BUMEX) 2 MG tablet Take 1 tablet by mouth 2 times daily in the am 60 tablet 3    potassium chloride (KLOR-CON M) 20 MEQ extended release tablet Take 20 mEq by mouth 2 times daily      allopurinol (ZYLOPRIM) 300 MG tablet Take 300 mg by mouth daily       azelastine (ASTELIN) 0.1 % nasal spray 1 spray by Nasal route as needed       isosorbide mononitrate (IMDUR) 60 MG CR tablet Take 60 mg by mouth 2 times daily       OXYGEN Inhale 3 L/min into the lungs      hydrALAZINE (APRESOLINE) 25 MG tablet Take 1 tablet by mouth every 8 hours. 90 tablet 0     No current facility-administered medications for this visit. Physical Exam   /60   Pulse 76   Resp 16   Ht 5' 8\" (1.727 m)   Wt 198 lb 12.8 oz (90.2 kg)   BMI 30.23 kg/m²   Constitutional: Awake, alert, no acute respiratory distress, +O2 in place  Head: Normocephalic and atraumatic  Neck: Neck supple, no carotid bruits  Cardiovascular: RRR, no murmurs  Pulmonary: Decreased BS B/L, no wheezing  Abdominal: Soft. +BS.   Musculoskeletal: KRUEGER x 4, no focal motor deficits  Extremitites: KRUEGER x 4, +LE edema (improved)  Skin: LE stasis dermatitis changes. Not diaphoretic. CBC:   Lab Results   Component Value Date    WBC 13.2 06/19/2018    RBC 2.84 06/19/2018    HGB 8.4 06/19/2018    HCT 26.8 06/19/2018    MCV 94.4 06/19/2018    MCH 29.6 06/19/2018    MCHC 31.3 06/19/2018    RDW 15.7 06/19/2018     06/19/2018    MPV 12.7 06/19/2018     BMP:   Lab Results   Component Value Date     06/19/2018    K 4.1 06/19/2018     06/19/2018    CO2 26 06/19/2018    BUN 30 06/19/2018    LABALBU 2.9 06/17/2018    CREATININE 1.6 06/19/2018    CALCIUM 8.6 06/19/2018    GFRAA 51 06/19/2018    LABGLOM 42 06/19/2018     Magnesium:    Lab Results   Component Value Date    MG 2.1 04/14/2018     Cardiac Enzymes:   Lab Results   Component Value Date    CKTOTAL 83 08/24/2017    CKTOTAL 49 01/19/2017    CKTOTAL 47 01/19/2017    CKMB 2.0 01/19/2017    CKMB 2.1 01/19/2017    CKMB 4.3 10/06/2015    TROPONINI 0.02 04/13/2018    TROPONINI 0.02 11/13/2017    TROPONINI 0.02 11/13/2017      PT/INR:    Lab Results   Component Value Date    PROTIME 13.9 06/20/2018    INR 1.2 06/20/2018     TSH:    Lab Results   Component Value Date    TSH 3.550 11/13/2017       EKG: Paced rhythm    Echocardiogram: 3/6/15  Limited echocardiogram performed in order to reassess LV function. Severly dilated left ventricle. Severely reduced left ventricular systolic function. Ejection fraction is visually estimated at 25-30%. There is doppler evidence of stage III diastolic dysfunction. Echocardiogram: 9/16/16   Ejection fraction is visually estimated at 25-30%.   Severe global hypokinesis   Stage III diastolic dysfunction   The left atrium is mild-moderately dilated.   Increased E point septal separation noted,suggesting decreased LV cardiac output   Mild to moderate mitral regurgitation with central jet   The aortic valve appears mildly sclerotic.   Pulmonary hypertension is mild. Vallie Hilts nuclear stress test: 9/16/16  1.  No evidence of pharmacologic

## 2018-09-17 NOTE — PROGRESS NOTES
Cardiac Electrophysiology Office Progress Note    Sahara Mota  1940  Date of Service: 9/17/18   PCP: Cesar Mckeon MD   Cardiology: KHADIJAH Snow MD  Cardiac Electrophysiology: Sravan Jack DO    SUBJECTIVE: Sahara Mota is a 69 y.o. male who presents to the office for the management of these chronic cardiac conditions: ICMP, PAF s/p AVJ ablation, H/O recurrent VT and CRT-D in situ. Since his last office visit, he offers no new complaints. He has had recurrent ventricular tachycardia which has been asymptomatic and terminated by ATP. He has not received any ICD shocks. He reports no symptoms of chest pain, shortness of breath, orthopnea or PND. He remains on mexiletine 250 mg 3 times a day. Patient Active Problem List    Diagnosis Date Noted    Invasive carcinoma of urinary bladder ([facility] 02/13/2015     Priority: High     Overview Note:     1. S/p CTX/Radiation therapy.  Ischemic cardiomyopathy 12/03/2014     Priority: High     Overview Note:     1. S/P echocardiogram(11/2014): LVEF--45%, mild MR, LAE--moderate(LA volume--78 ml). 2. Echocardiogram(12/4/14): Summary  Severly dilated left ventricle. Severely reduced left ventricular systolic function. Ejection fraction is visually estimated at 20%. There is doppler evidence of stage II diastolic dysfunction. Mildly-moderately dilated left atrium by volume index. No evidence of hemodynamically significant valve disease.  Biventricular implantable cardiac defibrillator in situ 05/29/2013     Priority: High     Overview Note:     1.  Medtronic Viva XT CRT-D [id number], SN#: [id number], DDDR  ppm.      Atrial lead: BELLO 1495, RV Lead: MDT 5680  LV lead: MDT 1219        NYHA class 3 heart failure with reduced ejection fraction ([facility] 02/20/2015     Priority: Medium    CKD (chronic kidney disease) stage 3, GFR 30-59 ml/min ([facility] 12/02/2014     Priority: Medium    Persistent atrial fibrillation ([facility] 07/12/2013     Priority: implants    CT CYSTOURETHROSCOPY,URETER CATHETER Left 6/8/2018    CYSTOSCOPY RETROGRADE PYELOGRAM LEFT STENT CHANGE +++IV DYE ALLERGY+++ performed by Tania Thompson DO at [place] [address] TUR  1/20/16    URETER STENT PLACEMENT Left 07/08/2016       Family History   Problem Relation Age of Onset    Diabetes Father     Heart Disease Father     Alzheimer's Disease Mother        Social History   Substance Use Topics    Smoking status: Former Smoker     Packs/day: 2.50     Years: 15.00     Types: Cigarettes     Quit date: 7/8/1976    Smokeless tobacco: Never Used      Comment: stop 1976    Alcohol use No       Current Outpatient Prescriptions   Medication Sig Dispense Refill    mexiletine (MEXITIL) 250 MG capsule TAKE 1 CAPSULE THREE TIMES DAILY 270 capsule 3    insulin glargine (LANTUS) 100 UNIT/ML injection vial Inject 50 Units into the skin 2 times daily      warfarin (COUMADIN) 2.5 MG tablet 4.5 mg on mon and fri;  3 mg all other days      vitamin D (CHOLECALCIFEROL) 1000 UNIT TABS tablet Take 1,000 Units by mouth daily      metolazone (ZAROXOLYN) 2.5 MG tablet Take 2.5 mg by mouth as needed 1 tablet q day for 3 days for excessive swelling and weight gain      aspirin 81 MG chewable tablet Take 1 tablet by mouth daily 30 tablet 0    insulin aspart (NOVOLOG) 100 UNIT/ML injection vial Inject into the skin 3 times daily (before meals) Sliding scale with each meal      metoprolol succinate (TOPROL XL) 200 MG extended release tablet Take 1 tablet by mouth 2 times daily 60 tablet 3    bumetanide (BUMEX) 2 MG tablet Take 1 tablet by mouth 2 times daily in the am 60 tablet 3    potassium chloride (KLOR-CON M) 20 MEQ extended release tablet Take 20 mEq by mouth 2 times daily      allopurinol (ZYLOPRIM) 300 MG tablet Take 300 mg by mouth daily       azelastine (ASTELIN) 0.1 % nasal spray 1 spray by Nasal route as needed       isosorbide mononitrate (IMDUR) 60 MG CR tablet Take 60 mg by mouth Moderate;Sensitivity - Allergy.      - IV contrast dye:Severity - Moderate;Sensitivity - Allergy.      - Other drug:Severity - Moderate;Sensitivity - Allergy(Multaq, Tikosyn,      Amiodarone).     Findings      Left Ventricle   Left ventricle was not well visualized.   Moderately dilated left ventricle.   Septal motion consistent with paced rhythm .   Stage II diastolic dysfunction.   Measured ejection fraction is 25 %.     Right Ventricle   Normal right ventricle structure and function.   Pacer wire visualized in right ventricle.      Left Atrium   The left atrium is moderately dilated.   Interatrial septum not well visualized.      Right Atrium   Normal right atrium.      Mitral Valve   Mild mitral regurgitation is present.      Tricuspid Valve   Normal tricuspid valve structure and function.   Mild tricuspid regurgitation.  RVSP is normal.      Aortic Valve   No hemodynamically significant aortic stenosis is present.      Pulmonic Valve   The pulmonic valve was not well visualized.      Pericardial Effusion   No evidence for hemodynamically significant pericardial effusion.     [place] was not clearly visualized.   Miscellaneous   Inferior Vena Cava not well visualized.      Conclusions      Summary   Technically limited study - no Definity used   Left ventricle was not well visualized.   Moderately dilated left ventricle.   Septal motion consistent with paced rhythm .   Stage II diastolic dysfunction.   Measured ejection fraction is 25 %.   Barbara 1765 mitral regurgitation is present.      Signature      ----------------------------------------------------------------   Electronically signed by Cecilia Peace MD(Interpreting   [email or web address]) on 11/14/2017 03:03 PM   ----------------------------------------------------------------     M-Mode/2D Measurements & Calculations                 LV Volume Diastolic: 054.1 ml       AV Cusp Separation: 1.7              LV Volume Systolic: 146.1 ml        cmAO Root Dimension: 2.7 cm   CO: 5.06   LV EDV/LV EDV Index: 266.6 ml/124   l/min      ml/m^2LV ESV/LV ESV Index: 200.1   CI: 2.35   ml/93ml/ m^2   l/m*m^2    EF Calculated: 24.9 %                                                  LA/Aorta: 1.89              LV Length: 9.7 cm                                                  LA volume/Index: 71 ml              LVOT: 2 cm                          /33.03ml/m^2                                                  RA Area: 13.6 cm^2     Doppler Measurements & Calculations      MV Peak E-Wave: 1.1  AV Peak Velocity: 1.76 LVOT Peak Velocity: 1.23 m/s   m/s                  m/s                    LVOT Mean Velocity: 0.86 m/s   MV Peak A-Wave: 0.66 AV Peak Gradient:      LVOT Peak Gradient: 6 mmHgLVOT   m/s                  12.43 mmHg             Mean Gradient: 3.2 mmHg   MV E/A Ratio: 1.67   AV Mean Velocity: 1.19 Estimated RVSP: 25 mmHg   MV Peak Gradient:    m/s                    Estimated RAP:3 mmHg   4.5 mmHg             AV Mean Gradient: 6.4   MV Mean Gradient:    mmHg   2.1 mmHg             AV VTI: 27.9 cm        TR Velocity:2.35 m/s   MV Mean Velocity:    AV Area                TR Gradient:22.03 mmHg   0.68 m/s             (Continuity):2.42 cm^2 PV Peak Velocity: 0.94 m/s   MV Deceleration                             PV Peak Gradient: 3.53 mmHg   Time: 260.2 msec     LVOT VTI: 21.5 cm      PV Mean Velocity: 0.7 m/s   MV P1/2t: 97.7 msec                         PV Mean Gradient: 2.1 mmHg   MVA by PHT:2.25 cm^2 Estimated PASP: 25.03   MV Area              mmHg   (continuity): 1.9   cm^2   MV E' Septal   Velocity: 0.04 m/s   MV E' Lateral   Velocity: 6 m/s     Cardiac Catheterization(2017):   CARDIAC CATHETERIZATION     PATIENT NAME: Arvin PETTIT                                 :       1940  MED REC NO:   [id number]                                       ROOM:      [id number]  ACCOUNT NO:   [id number]                                     ADMISSION  DATE:  2017  PROVIDER: Kathe Gonzales large PDA, which also  had given luminal irregularities but without any significant  angiographic luminal narrowing.  No posterolateral branch was noted on  the ejection of the right coronary artery. LIMA TO LAD:  The left internal mammary artery to the left anterior  descending artery is widely patent along its course including its  distal anastomosis.  The LAD after the distal anastomosis was a very  small caliber vessel which tapers further down towards the apex, but  which does not appear to have any discrete lesions. SVG TO LAD and DIAGONAL BRANCH:  The saphenous vein graft to the  diagonal branch of the left anterior descending artery is patent along  its course including its distal anastomosis.  The diagonal branch  after the distal anastomosis also is a very small caliber vessel. SVG TO LCX LATERAL BRANCH:  The saphenous vein graft to the left  circumflex.  Terminal lateral branch is  patent along its course  including its distal anastomosis.  The left circumflex lateral branch  also is patent after the distal anastomosis.  There was around 50%  luminal narrowing in the proximal segment of the vein graft.     The pressures were well measured in the left ventricle but no LV gram  was performed because of the patient's chronic kidney disease.     RIGHT FEMORAL ANGIOGRAPHY:  Right common femoral artery and the  proximal segment of the right superficial femoral artery and the right  profunda did not appear to have any significant disease.  Closure of  the exit site was performed with the Angio-Seal device with  achievement of adequate hemostasis.     The patient tolerated the procedure well and left the cardiac  catheterization pruitt in a stable condition.     CONCLUSIONS:  1.  Coronary artery disease. a.  Left main with no significant disease. b.  LAD occluded at the mid vessel level.   c.  LCX mild-to-moderate disease in the proximal and mid vessel with  occluded terminal and lateral branch of the left Tikosyn and intolerant to amiodarone (pulmonary toxicity)    - TTE 9/16/2016: EF 25-30%, LA moderately dilated  - currently in sinus rhythm  - S/P AVJ ablation 1/20/2017      3. CAD  - S/p CABG 2005  - Last ischemia evaluation 9/2016: Dejah Chawla nuclear stress  - follows with Dr. Kimberlee Gomez    - s/p C by Dr. Jose Martin Ochoa above.      4. Chronic systolic CHF/ ischemic CMP with combined diastolic dysfunction   - NYHA III Stage C  - On GDMT: Toprol XL, Imdur/hydralazine  - TTE 11/2017:  EF 25%. Severe global hypokinesis  - as per Cardiology      5. CRT-D in situ  - Former patient of Dr. Lovely Baugh  - originally implanted 2007  - Upgrade to [address]  - Please note RV lead; [employer] [id number] is on advisory (SIC = 0, lead impedence is stable)  - Normal function     6. CKD  Lab Results   Component Value Date    CREATININE 1.6 (H) 06/19/2018   Estimated Creatinine Clearance: 40 mL/min (A) (based on SCr of 1.6 mg/dL (H)).     7. HTN      8. CHRISTINE  - compliant with CPAP at home      9. Obesity    Body mass index is 32.88 kg/m².      10. Bladder Cancer  - S/p chemo/ radiation one year ago     11. Hypomagnesemia  - Mg-oxide to 400 mg BID  Lab Results   Component Value Date    MG 2.1 04/14/2018       Plan:     1. No changes were made in his medications today. 2. He will send a remote transmission in 3 months. 3. In-office follow-up in 6 months. 4. He was asked to call the office with concerns prior to his scheduled follow-up. Thank you for allowing me to participate in your patient's care. I have independently reviewed all of the ECGs as per above. I have reviewed all progress notes & records from the time of the patient's last office visit up to present. Eulogio Diego, [facility] Cardiac Electrophysiology  [address] [facility]    NOTE: This report was transcribed using voice recognition software.  Every effort was made to ensure accuracy; however, inadvertent computerized

## 2018-09-21 NOTE — TELEPHONE ENCOUNTER
Mrs. Aguialr Jacobs called regarding transmission being sent for fast pulse approximately 120 bpm - thought it might be Afib episode - didn't have O2 on when she came home and was concerned - informed transmission had no significant arrhythmias noted - she states pulse ox is now 93% and pulse rate \"feels back to normal\" - also assure current rhythm is stable. Call if any further questions.

## 2018-10-03 PROBLEM — N39.0 UTI (URINARY TRACT INFECTION): Status: RESOLVED | Noted: 2018-01-01 | Resolved: 2018-01-01

## 2018-10-03 PROBLEM — I50.42 CHRONIC COMBINED SYSTOLIC AND DIASTOLIC CONGESTIVE HEART FAILURE (HCC): Chronic | Status: RESOLVED | Noted: 2018-01-01 | Resolved: 2018-01-01

## 2018-10-03 PROBLEM — N39.0 UTI (URINARY TRACT INFECTION): Status: ACTIVE | Noted: 2018-01-01

## 2018-10-03 PROBLEM — N17.9 ACUTE KIDNEY INJURY (HCC): Status: ACTIVE | Noted: 2018-01-01

## 2018-10-03 PROBLEM — N17.9 ACUTE KIDNEY INJURY (HCC): Status: RESOLVED | Noted: 2018-01-01 | Resolved: 2018-01-01

## 2018-10-03 PROBLEM — R31.0 GROSS HEMATURIA: Status: RESOLVED | Noted: 2018-01-01 | Resolved: 2018-01-01

## 2018-10-03 PROBLEM — R31.9 HEMATURIA: Status: RESOLVED | Noted: 2018-01-01 | Resolved: 2018-01-01

## 2018-10-03 PROBLEM — N30.00 ACUTE CYSTITIS: Status: ACTIVE | Noted: 2018-01-01

## 2018-10-03 NOTE — ED PROVIDER NOTES
anorexia, diarrhea, dysphagia, hematochezia, melena, nausea and vomiting. Genitourinary: Negative for dysuria, frequency and urgency. Musculoskeletal: Negative for arthralgias, falls and myalgias. Neurological: Negative for dizziness, seizures, loss of consciousness and headaches. Physical Exam   Constitutional: Vital signs are normal. He appears well-developed and well-nourished. He is active and cooperative. HENT:   Head: Normocephalic and atraumatic. Right Ear: Hearing and external ear normal.   Left Ear: Hearing and external ear normal.   Nose: Nose normal.   Eyes: Conjunctivae and lids are normal.   Neck: Trachea normal.   Cardiovascular: Normal rate, regular rhythm and normal heart sounds. Pulmonary/Chest: Effort normal and breath sounds normal.   Abdominal: Normal appearance and bowel sounds are normal. There is no tenderness. Neurological: He is alert. GCS eye subscore is 4. GCS verbal subscore is 5. GCS motor subscore is 6. Patient is oriented to person and place, he is not oriented to time and does not know the president is. Patient's wife states that this is baseline for him. Skin: Skin is warm, dry and intact. Psychiatric: He has a normal mood and affect. His speech is normal and behavior is normal. Judgment and thought content normal. Cognition and memory are impaired. Procedures    MDM  Number of Diagnoses or Management Options  DANA (acute kidney injury) ([facility]):   Dizziness:   Urinary tract infection with hematuria, site unspecified:   Diagnosis management comments: Patient's lab work showed UTI, as well as DANA. Patient will be admitted to the hospital for treatment of these conditions. I have spoken to Dr. Tomás Hernandez and he will admit the patient. I discussed this with the patient and his wife they're both agreeable to plan of verbalize understanding. ED Course as of Oct 03 1843   Wed Oct 03, 2018   1818 Spoke to Dr. Tomás Hernandez, he will admit the patient.   [DS]      ED

## 2018-10-04 NOTE — H&P
dependent     3L/nc cont    Paroxysmal atrial fibrillation ([address]     Ventricular tachycardia ([address]        Past Surgical History:    Past Surgical History:   Procedure Laterality Date    ABDOMEN SURGERY  2006    perforated bowel dr Gina Trevino DYSRHYTHMIC FOCUS  02/2017    dr Candace Amanda  08/30/2017    Dr. Mihai Valladares- Bypass grafts patent   [address] Left 2010     dr Sekou Edwards    CHOLECYSTECTOMY  2007    COLONOSCOPY      CORONARY ARTERY BYPASS GRAFT  2005    cabg dr Handley Sales  x 2    CYSTOSCOPY  03/07/2016    with clot evacuation    CYSTOSCOPY Left 11/02/2016    Stent Exchange    CYSTOSCOPY Left 03/29/2017    cysto L stent change digital rectal exam     CYSTOSCOPY  07/10/2017    retrogrades,peylogram, left stent exchange    CYSTOSCOPY Left 12/11/2017    retrotgrade  left stent    DILATATION, ESOPHAGUS  2006    ECHO COMPL W DOP COLOR FLOW  7/14/2013         EYE SURGERY      gian lense implants    RI CYSTOURETHROSCOPY,URETER CATHETER Left 6/8/2018    CYSTOSCOPY RETROGRADE PYELOGRAM LEFT STENT CHANGE +++IV DYE ALLERGY+++ performed by Yamileth Thompson DO at [place] [address] [facility]  1/20/16    URETER STENT PLACEMENT Left 07/08/2016       Medications Prior to Admission:    Prescriptions Prior to Admission: warfarin (COUMADIN) 3 MG tablet, Take 3 mg by mouth Five times weekly ALL Days except 4.5 mg Monday and Friday (taken at night)  warfarin (COUMADIN) 3 MG tablet, Take 4.5 mg by mouth Twice a Week Monday and Friday (taken at night)  mirtazapine (REMERON) 30 MG tablet, Take 30 mg by mouth nightly  sulfamethoxazole-trimethoprim (BACTRIM DS;SEPTRA DS) 800-160 MG per tablet, Take 0.5 tablets by mouth 4 times daily  mexiletine (MEXITIL) 250 MG capsule, TAKE 1 CAPSULE THREE TIMES DAILY (Patient taking differently: TAKE 1 CAPSULE BY MOUTH THREE TIMES DAILY)  insulin glargine (LANTUS) 100 UNIT/ML injection vial, Inject 50 Units into the skin 2 times daily  vitamin D (CHOLECALCIFEROL) 1000 UNIT TABS tablet, Take 1,000 Units by mouth Daily with lunch   aspirin 81 MG chewable tablet, Take 1 tablet by mouth daily (Patient taking differently: Take 81 mg by mouth nightly )  insulin aspart (NOVOLOG) 100 UNIT/ML injection vial, Inject 0-32 Units into the skin 3 times daily (before meals) Sliding scale with each meal   metoprolol succinate (TOPROL XL) 200 MG extended release tablet, Take 1 tablet by mouth 2 times daily  bumetanide (BUMEX) 2 MG tablet, Take 1 tablet by mouth 2 times daily in the am (Patient taking differently: Take 2 mg by mouth 2 times daily )  potassium chloride (KLOR-CON M) 20 MEQ extended release tablet, Take 20 mEq by mouth 2 times daily  allopurinol (ZYLOPRIM) 300 MG tablet, Take 300 mg by mouth Daily with lunch   isosorbide mononitrate (IMDUR) 60 MG CR tablet, Take 60 mg by mouth 2 times daily   OXYGEN, Inhale 3 L/min into the lungs continuous   hydrALAZINE (APRESOLINE) 25 MG tablet, Take 1 tablet by mouth every 8 hours. metolazone (ZAROXOLYN) 2.5 MG tablet, Take 2.5 mg by mouth as needed 1 tablet q day for 3 days for excessive swelling and weight gain  azelastine (ASTELIN) 0.1 % nasal spray, 1 spray by Nasal route 2 times daily as needed for Rhinitis     Allergies:    Multaq [dronedarone]; Amiodarone; Bactrim [sulfamethoxazole-trimethoprim]; Iv dye [iodides]; Pollen extract; Tikosyn [dofetilide]; and Doxycycline    Social History:    reports that he quit smoking about 42 years ago. His smoking use included Cigarettes. He has a 37.50 pack-year smoking history. He has never used smokeless tobacco. He reports that he does not drink alcohol or use drugs. Family History:   family history includes Alzheimer's Disease in his mother; Diabetes in his father; Heart Disease in his father.     REVIEW OF SYSTEMS    Constitutional: negative for chills and fevers  Eyes: negative for icterus and redness  Ears, nose, mouth, throat, and face: evidence of parenchymal hemorrhage or extra-axial fluid collection  is identified. There is no evidence of cortical infarction or  contusion, with preservation of gray-white matter distinction. There  is a 3 mm focus of low attenuation in the posterior aspect of the  right external capsule, consistent with an age-indeterminate lacunar  infarct (series 2, image 19). There are patchy regions of low  attenuation in the deep periventricular white matter, most prominently  about the frontal horns of lateral ventricles bilaterally, with  extension into the corona radiata and centrum semiovale, most  compatible with moderate microangiopathic ischemic changes.  No focal  mass lesion or midline shift is identified. There is no depressed skull fracture. No lytic or blastic osseous  lesions are seen. There are multiple mucous retention cysts versus  polyps in the left maxillary sinus. There is no significant mucosal  thickening or fluid levels within paranasal sinuses or mastoid air  cells. Impression:     1. No evidence of intracranial hemorrhage, extra axial collection,  space-occupying mass lesion or mass effect, midline shift, or acute  territorial infarction. If there is strong clinical suspicion for  acute infarct of the brain, then MR imaging of the brain with  diffusion-weighted sequence may be obtained for further evaluation. 2. Subcentimeter age-indeterminate lacunar infarct in the posterior  aspect of the right external capsule. 3. Chronic involutional changes and moderate microvascular ischemic  disease as described above.    Brain Natriuretic Peptide [[id number]] (Abnormal) Collected: 10/03/18 1536   Updated: 10/03/18 1640     Pro-BNP 9,696 (H) pg/mL   EKG 12 Lead [[id number]] Collected: 10/03/18 1557   Updated: 10/03/18 1630     Ventricular Rate 75 BPM    Atrial Rate 75 BPM    P-R Interval 136 ms    QRS Duration 176 ms    Q-T Interval 462 ms    QTc Calculation (Bazett) 515 ms    P Axis 28 degrees    R Axis Consult urology regarding hematuria    4. Empiric ceftriaxone for suspected urinary tract infection    5. Basal/bolus insulin therapy    6. Continue cardiac medications as at home    7.  Monitor renal function closely and watch closely for volume overload due to the patient's history of systolic heart failure     Tete Moore D.O., Jordi Rose  11:07 AM  10/4/2018

## 2018-10-04 NOTE — CONSULTS
father. Review of Systems:  Respiratory: negative for cough and hemoptysis  Cardiovascular: negative for chest pain and dyspnea. He has  stable coronary artery disease. aatrial fibrillation and subsequent anticoagulation. He has a biventricular implantable cardioverter defibrillator  Gastrointestinal: negative for abdominal pain, diarrhea, nausea and vomiting  Derm: negative for rash and skin lesion(s)  Neurological: negative for seizures and tremors  Endocrine: diabetes mellitus hypertension hyperlipidemia. He has a history of azotemiaGU: As above in the HPI, otherwise negative  All other reviews are negative    Physical Exam:     Vitals:  /61   Pulse 76   Temp 98 °F (36.7 °C) (Oral)   Resp 16   Ht 5' 8.5\" (1.74 m)   Wt 192 lb 5 oz (87.2 kg)   SpO2 97%   BMI 28.82 kg/m²     General:  Awake, alert, oriented X 2  Well developed, well nourished, well groomed. No apparent distress. HEENT:  Normocephalic, atraumatic. Pupils equal, round. No scleral icterus. No conjunctival injection. Normal lips, teeth, and gums. No nasal discharge. Neck:  Supple, no masses. Heart:  RRR  Lungs:  No audible wheezing. Respirations symmetric and non-labored. Abdomen:  soft, nontender, no masses, no organomegaly, no peritoneal signs  Extremities:  No clubbing, cyanosis, or edema  Skin:  Warm and dry, no open lesions or rashes  Neuro:  There are no motor or sensory deficits in the 4 quadrant extremities   Rectal: deferred  Genitalia:  Uncircumcised recessed penis testes epididymis and cord normal    Labs:     Recent Labs      10/03/18   1536  10/04/18   0520   WBC  15.2*  14.8*   RBC  3.72*  3.50*   HGB  9.5*  9.0*   HCT  32.0*  30.2*   MCV  86.0  86.3   MCH  25.5*  25.7*   MCHC  29.7*  29.8*   RDW  18.5*  18.3*   PLT  327  299   MPV  12.5*  12.2*         Recent Labs      10/03/18   1536  10/04/18   0520   CREATININE  3.0*  2.4*         Assessment:  Eloy Casas 69 y.o. male     Left hydronephrosis associated

## 2018-10-04 NOTE — PATIENT CARE CONFERENCE
[facility] Quality Flow/Interdisciplinary Rounds Progress Note        Quality Flow Rounds held on October 4, 2018    Disciplines Attending:  Bedside Nurse, ,  and Nursing Unit Leadership    Jordan Avila was admitted on 10/3/2018  2:07 PM    Anticipated Discharge Date:  Expected Discharge Date: 10/05/18    Disposition:    Fabian Score:  Fabian Scale Score: 19    Readmission Risk              Risk of Unplanned Readmission:        32             Discussed patient goal for the day, patient clinical progression, and barriers to discharge.   The following Goal(s) of the Day/Commitment(s) have been identified:  Diagnostics - Report Results      Perry Ahuja  October 4, 2018

## 2018-10-04 NOTE — PROGRESS NOTES
Pt refused blood draw for inr to take his coumadin. He said just wait till morning. He states \"I take it every day\" explained to pt I needed to check his inr before giving the medication. Pt refused blood draw and medication.

## 2018-10-04 NOTE — PROGRESS NOTES
Pt incot for a large amount of urine, yellow with small to moderate amount of clots, he then voided 50 cc, and I PVR pt for 131cc

## 2018-10-04 NOTE — PLAN OF CARE
Problem: Falls - Risk of:  Goal: Will remain free from falls  Will remain free from falls    Outcome: Ongoing      Problem: Skin Integrity:  Goal: Absence of new skin breakdown  Absence of new skin breakdown   Outcome: Ongoing

## 2018-10-05 NOTE — PROGRESS NOTES
hydroxide (MILK OF MAGNESIA) 400 MG/5ML suspension 30 mL  30 mL Oral Daily PRN Elif Canela MD        ondansetron [facility]) injection 4 mg  4 mg Intravenous Q6H PRN Elif Canela MD        glucose (GLUTOSE) 40 % oral gel 15 g  15 g Oral PRN Elif Canela MD        dextrose 50 % solution 12.5 g  12.5 g Intravenous PRN Elif Canela MD        glucagon (rDNA) injection 1 mg  1 mg Intramuscular PRN Elif Canela MD        dextrose 5 % solution  100 mL/hr Intravenous PRN Elif Canela MD        insulin lispro (HUMALOG) injection vial 0-18 Units  0-18 Units Subcutaneous TID WC Elif Canela MD   3 Units at 10/04/18 1700    insulin lispro (HUMALOG) injection vial 0-9 Units  0-9 Units Subcutaneous Nightly Elif Canela MD   2 Units at 10/04/18 2052       Problem list:    Patient Active Problem List   Diagnosis    Coronary artery disease involving native coronary artery of native heart without angina pectoris    Biventricular implantable cardioverter-defibrillator in situ    Hyperlipidemia with target LDL less than 100    Permanent atrial fibrillation ([address]    Diabetes mellitus type 2, uncontrolled ([address]    CKD (chronic kidney disease) stage 3, GFR 30-59 ml/min (HCC)    Invasive carcinoma of urinary bladder (HCC)    Benign prostatic hyperplasia    HTN (hypertension), benign    Acute kidney injury ([address]    Acute cystitis       Assessment:    1. Acute kidney injury superimposed on chronic kidney disease stage III     2. Atrial fibrillation     3. Hematuria due to bladder cancer in the setting of supratherapeutic anticoagulation     4. Dementia     5. Invasive carcinoma of the urinary bladder     6. Ischemic cardiomyopathy status post biventricular ICD     7. Essential hypertension     8. Diabetes mellitus type II, uncontrolled     9. Partially treated urinary tract infection    Plan:    1. Continue fluids but decrease rate    2. Continue antibiotics    3. Continue to hold warfarin    4.

## 2018-10-06 NOTE — PROGRESS NOTES
Amber Valdes MD, 10 mL at 10/05/18 1449    magnesium hydroxide (MILK OF MAGNESIA) 400 MG/5ML suspension 30 mL, 30 mL, Oral, Daily PRN, Ciro Hobbs MD    ondansetron [facility]) injection 4 mg, 4 mg, Intravenous, Q6H PRN, Ciro Hobbs MD    glucose (GLUTOSE) 40 % oral gel 15 g, 15 g, Oral, PRN, Ciro Hobbs MD    dextrose 50 % solution 12.5 g, 12.5 g, Intravenous, PRN, Ciro Hobbs MD    glucagon (rDNA) injection 1 mg, 1 mg, Intramuscular, PRN, Ciro Hobbs MD    dextrose 5 % solution, 100 mL/hr, Intravenous, PRN, Ciro Hobbs MD    insulin lispro (HUMALOG) injection vial 0-18 Units, 0-18 Units, Subcutaneous, TID WC, Ciro Hobbs MD, 6 Units at 10/05/18 1647    insulin lispro (HUMALOG) injection vial 0-9 Units, 0-9 Units, Subcutaneous, Nightly, Ciro Hobbs MD, 2 Units at 10/04/18 2052    Objective:    /61   Pulse 75   Temp 98 °F (36.7 °C) (Oral)   Resp 16   Ht 5' 8.5\" (1.74 m)   Wt 203 lb 3.2 oz (92.2 kg)   SpO2 100%   BMI 30.45 kg/m²     Heart:  Reg  Lungs:  CTA bilaterally, no wheeze, rales or rhonchi  Abd: bowel sounds present, nondistended  Extrem:  Min edema legs    CBC with Differential:    Lab Results   Component Value Date    WBC 12.1 10/06/2018    RBC 2.70 10/06/2018    HGB 7.2 10/06/2018    HCT 23.6 10/06/2018     10/06/2018    MCV 87.4 10/06/2018    MCH 26.7 10/06/2018    MCHC 30.5 10/06/2018    RDW 18.3 10/06/2018    SEGSPCT 72 07/15/2013    BANDSPCT 3 03/18/2016    LYMPHOPCT 12.7 10/04/2018    MONOPCT 8.7 10/04/2018    MYELOPCT 5 05/28/2015    BASOPCT 0.3 10/04/2018    MONOSABS 1.28 10/04/2018    LYMPHSABS 1.87 10/04/2018    EOSABS 2.44 10/04/2018    BASOSABS 0.05 10/04/2018     CMP:    Lab Results   Component Value Date     10/06/2018    K 4.0 10/06/2018    K 5.2 10/03/2018     10/06/2018    CO2 22 10/06/2018    BUN 29 10/06/2018    CREATININE 1.5 10/06/2018    GFRAA 55 10/06/2018    LABGLOM 45 10/06/2018    GLUCOSE 140 10/06/2018    PROT 6.1

## 2018-10-06 NOTE — PROGRESS NOTES
Dereck Key served Dr. Aysha Looney in regards to Pt's urine status and pt's INR of 1.5 Dr. Leonor Wesley is covering.  Electronically signed by Sahra Nugent RN on 10/6/2018 at 6:56 AM

## 2018-10-06 NOTE — PROGRESS NOTES
evacuation    CYSTOSCOPY Left 11/02/2016    Stent Exchange    CYSTOSCOPY Left 03/29/2017    cysto L stent change digital rectal exam     CYSTOSCOPY  07/10/2017    retrogrades,peylogram, left stent exchange    CYSTOSCOPY Left 12/11/2017    retrotgrade  left stent    DILATATION, ESOPHAGUS  2006    ECHO COMPL W DOP COLOR FLOW  7/14/2013         EYE SURGERY      gian lense implants    NM CYSTOURETHROSCOPY,URETER CATHETER Left 6/8/2018    CYSTOSCOPY RETROGRADE PYELOGRAM LEFT STENT CHANGE +++IV DYE ALLERGY+++ performed by Zo Thompson DO at [place] [id number] TURP  1/20/16    URETER STENT PLACEMENT Left 07/08/2016        PAST FAMILY HISTORY:    Family History   Problem Relation Age of Onset    Diabetes Father     Heart Disease Father     Alzheimer's Disease Mother        PAST SOCIAL HISTORY:    Social History     Social History    Marital status:      Spouse name: N/A    Number of children: N/A    Years of education: N/A     Social History Main Topics    Smoking status: Former Smoker     Packs/day: 2.50     Years: 15.00     Types: Cigarettes     Quit date: 7/8/1976    Smokeless tobacco: Never Used      Comment: stop 1976    Alcohol use No    Drug use: No    Sexual activity: Not Asked     Other Topics Concern    None     Social History Narrative    None       IV:    sodium chloride 50 mL/hr at 10/05/18 1517    dextrose         PRN: belladonna-opium, sodium chloride flush, magnesium hydroxide, ondansetron, glucose, dextrose, glucagon (rDNA), dextrose    Scheduled:    sodium chloride  250 mL Intravenous Once    mineral oil-hydrophilic petrolatum   Topical BID    cefTRIAXone (ROCEPHIN) IV  1 g Intravenous Q24H    allopurinol  300 mg Oral Lunch    aspirin  81 mg Oral Nightly    hydrALAZINE  25 mg Oral 3 times per day    insulin glargine  50 Units Subcutaneous BID    isosorbide mononitrate  60 mg Oral BID    metoprolol succinate  200 mg Oral BID    mexiletine  250 mg Oral 3 times per day    mirtazapine  30 mg Oral Nightly    vitamin D  1,000 Units Oral Lunch    sodium chloride flush  10 mL Intravenous 2 times per day    insulin lispro  0-18 Units Subcutaneous TID WC    insulin lispro  0-9 Units Subcutaneous Nightly       Lab Results   Component Value Date     10/06/2018    K 4.0 10/06/2018    K 5.2 10/03/2018    BUN 29 10/06/2018    CREATININE 1.5 10/06/2018        Lab Results   Component Value Date    HGB 7.2 10/06/2018    HCT 23.6 10/06/2018       Lab Results   Component Value Date    PSA 0.19 07/17/2018    PSA 0.93 02/11/2015       Constitutional: tired  Eyes: negative  Ears, nose, mouth, throat, and face: negative  Respiratory: negative  Cardiovascular: chf  Gastrointestinal: negative  Genitourinary: hematuria  Musculoskeletal: arthritis  Neurological: negative  Behavioral/Psych: dementia  Endocrine: negative    Skin dry, without rashes  Respirations non-labored, intact  Abdomen soft, non-tender, non-distended. Active bowel sounds. Obese. Alert and oriented x3  Uncircumcised. Blood passing per urethra. No clots      Assessment and Plan:  Hematuria with clot retention/History of bladder cancer/Left hydronephrosis  -Renal ultrasound shows mild left hydronephrosis with a well-positioned left ureteral stent. There is a simple left renal cyst. KUB did not demonstrate any stones. -Urine culture was negative  -Urine cytology is pending  -Continue to hold anticoagulants. -INR has normalized after vitamin K and FFP  -His external genitalia were prepped and draped sterilely. A 22-French three-way Gray was placed with lidocaine jelly without difficulty. There is return in approximately 200 mL of dark red blood. His catheter was irrigated manually and multiple clots were evacuated. This was done until his bladder was clear. This was connected to gravity drainage. He will need continued manual Irrigations every 2-4 hours and as needed.  If his hematuria does not resolve, then three-way bladder irrigation will need to be initiated.  -He may need cystoscopy and stent change at some point  -We will follow him during his hospital stay      Yang Nieves MD  10/6/2018  7:56 AM

## 2018-10-07 NOTE — PROGRESS NOTES
Irrigated catheter for few small clots.   Electronically signed by Jodie Pop RN on 10/7/2018 at 6:54 AM

## 2018-10-07 NOTE — PLAN OF CARE
Problem: Falls - Risk of:  Goal: Will remain free from falls  Will remain free from falls    Outcome: Met This Shift    Goal: Absence of physical injury  Absence of physical injury    Outcome: Met This Shift      Problem: Skin Integrity:  Goal: Will show no infection signs and symptoms  Will show no infection signs and symptoms   Outcome: Met This Shift    Goal: Absence of new skin breakdown  Absence of new skin breakdown   Outcome: Met This Shift

## 2018-10-07 NOTE — PROGRESS NOTES
[facility]  PROGRESS NOTE    Chief Complaint:  Hematuria with clot retention/History of bladder cancer/Left hydronephrosis  HPI:  He feels well and is not having flank pain or catheter discomfort. He is receiving a blood transfusion currently    Vitals:    10/07/18 1049   BP: 129/60   Pulse: 74   Resp: 16   Temp: 97.7 °F (36.5 °C)   SpO2: 99%       Allergies: Multaq [dronedarone]; Amiodarone; Bactrim [sulfamethoxazole-trimethoprim]; Iv dye [iodides]; Pollen extract;  Tikosyn [dofetilide]; and Doxycycline    PAST MEDICAL HISTORY:   Past Medical History:   Diagnosis Date    Arthritis     CAD (coronary artery disease)     Cardiac defibrillator in place     [facility]est[facility] failure) [facility]     Dementia     wife DPOA    Diabetes mellitus [facility]     Dizziness     uses walker    Dribbling of urine     Hematuria     HFrEF (heart failure with reduced ejection fraction) ([facility] 05/12/2017 9/16/16- echo LVEF 25-30%, stage III DD, mild-moderate MR, mild pulmonary hypertension, AV mildly sclerotic, LVDD: 7.4 cm    History of blood transfusion 2015    here    Native (hard of hearing)     Hypertension     Invasive carcinoma of urinary bladder ([facility] 2/13/2015    HAD CHEMO / RADIATION TX    Ischemic cardiomyopathy     CHRISTINE treated with BiPAP     Oxygen dependent     3L/nc cont    Paroxysmal atrial fibrillation ([address]     Ventricular tachycardia ([address]        PAST SURGICAL HISTORY:   Past Surgical History:   Procedure Laterality Date    ABDOMEN SURGERY  2006    perforated bowel dr Arpit Richards DYSRHYTHMIC FOCUS  02/2017    dr Denney Snellen  08/30/2017    Dr. Devika Cruz- Bypass grafts patent   [address] Left 2010     dr Tao Mejia    CHOLECYSTECTOMY  2007    COLONOSCOPY      CORONARY ARTERY BYPASS GRAFT  2005    cabg dr Germán Chun  x 2    CYSTOSCOPY  03/07/2016    with clot evacuation    CYSTOSCOPY Left 11/02/2016    Stent Exchange    CYSTOSCOPY Left 03/29/2017    cysto L stent change digital rectal exam     CYSTOSCOPY  07/10/2017    retrogrades,peylogram, left stent exchange    CYSTOSCOPY Left 12/11/2017    retrotgrade  left stent    DILATATION, ESOPHAGUS  2006    ECHO COMPL W DOP COLOR FLOW  7/14/2013         EYE SURGERY      gian lense implants    MA CYSTOURETHROSCOPY,URETER CATHETER Left 6/8/2018    CYSTOSCOPY RETROGRADE PYELOGRAM LEFT STENT CHANGE +++IV DYE ALLERGY+++ performed by Lan Thompson DO at [place] [id number] TURP  1/20/16    URETER STENT PLACEMENT Left 07/08/2016        PAST FAMILY HISTORY:    Family History   Problem Relation Age of Onset    Diabetes Father     Heart Disease Father     Alzheimer's Disease Mother        PAST SOCIAL HISTORY:    Social History     Social History    Marital status:      Spouse name: N/A    Number of children: N/A    Years of education: N/A     Social History Main Topics    Smoking status: Former Smoker     Packs/day: 2.50     Years: 15.00     Types: Cigarettes     Quit date: 7/8/1976    Smokeless tobacco: Never Used      Comment: stop 1976    Alcohol use No    Drug use: No    Sexual activity: Not Asked     Other Topics Concern    None     Social History Narrative    None       IV:    sodium chloride 50 mL/hr at 10/06/18 1903    dextrose         PRN: belladonna-opium, sodium chloride flush, magnesium hydroxide, ondansetron, glucose, dextrose, glucagon (rDNA), dextrose    Scheduled:    sodium chloride  250 mL Intravenous Once    mineral oil-hydrophilic petrolatum   Topical BID    cefTRIAXone (ROCEPHIN) IV  1 g Intravenous Q24H    allopurinol  300 mg Oral Lunch    hydrALAZINE  25 mg Oral 3 times per day    insulin glargine  50 Units Subcutaneous BID    isosorbide mononitrate  60 mg Oral BID    metoprolol succinate  200 mg Oral BID    mexiletine  250 mg Oral 3 times per day    mirtazapine  30 mg Oral Nightly    vitamin D  1,000 Units Oral Lunch   

## 2018-10-07 NOTE — PROGRESS NOTES
Patient screaming out in pain into hallway, patient stating he is having pain in bladder area. Irrigated patient for many small clots. Attempted to administer B & O suppository, however, per pharmacy the hospital is out of stock.    Electronically signed by Elenita Cobian RN on 10/7/2018 at 12:15 AM

## 2018-10-08 NOTE — PROGRESS NOTES
10/8/2018 7:40 AM  Service: Urology  Group: [facility] ([facility])    Monica Pay  [id number]    Subjective:  Afebrile  Confused but does not complain of pain  Tolerating chávez  Urine yellow but cloudy  Had blood yesterday hgb 7.1 this am        Review of Systems    Scheduled Meds:   mineral oil-hydrophilic petrolatum   Topical BID    cefTRIAXone (ROCEPHIN) IV  1 g Intravenous Q24H    allopurinol  300 mg Oral Lunch    hydrALAZINE  25 mg Oral 3 times per day    insulin glargine  50 Units Subcutaneous BID    isosorbide mononitrate  60 mg Oral BID    metoprolol succinate  200 mg Oral BID    mexiletine  250 mg Oral 3 times per day    mirtazapine  30 mg Oral Nightly    vitamin D  1,000 Units Oral Lunch    sodium chloride flush  10 mL Intravenous 2 times per day    insulin lispro  0-18 Units Subcutaneous TID WC    insulin lispro  0-9 Units Subcutaneous Nightly       Objective:  Vitals:    10/08/18 0600   BP: 123/60   Pulse:    Resp:    Temp:    SpO2:          Allergies: Multaq [dronedarone]; Amiodarone; Bactrim [sulfamethoxazole-trimethoprim]; Iv dye [iodides]; Pollen extract; Tikosyn [dofetilide]; and Doxycycline    General Appearance: Alert, pale. No acute distress. Skin: Warm and dry  Pulmonary/Chest:no respiratory distress  Abdomen: soft, non-tender, non-distended  Chávez well positioned and draining yellow, cloudy urine    Labs:     Recent Labs      10/08/18   0603   NA  141   K  4.1   CL  112*   CO2  22   BUN  20   CREATININE  1.2   GLUCOSE  51*   CALCIUM  8.3*       Lab Results   Component Value Date    HGB 7.1 10/08/2018    HCT 23.6 10/06/2018     Assessment/Plan:  Hematuria with clot retention/ history of bladder cancer, left hydronephrosis, left ureteral stent that is well positioned.  Left renal cyst.   -urine cytology is pending  Urine culture shows candida  Large amount of epi cells  Recommend continuing chávez catheter  Continue to hold coumadin until stable  Will leave decision to

## 2018-10-08 NOTE — PROGRESS NOTES
Subjective: In no distress this morning when 1st seen    Currently complaining of shortness of breath and chest pressure     Objective:    /81   Pulse 82   Temp 98.4 °F (36.9 °C) (Oral)   Resp 28   Ht 5' 8.5\" (1.74 m)   Wt 212 lb 11.2 oz (96.5 kg)   SpO2 96%   BMI 31.87 kg/m²   Skin: Warm and dry  Neck: Supple, no JVD  Heart:  Irregularly irregular with controlled rate  Lungs:  Diminished bilaterally with crackles right lower lobe  Abd: bowel sounds present, nontender, nondistended, no masses  Extrem:  No clubbing, cyanosis, or edema, pulses intact    I/O last 3 completed shifts: In: 2607 [P.O.:960; I.V.:1338;  Blood:309]  Out: 5559 [Urine:1725]    Laboratory:     BMP:    Lab Results   Component Value Date     10/08/2018    K 4.1 10/08/2018    K 5.2 10/03/2018     10/08/2018    CO2 22 10/08/2018    BUN 20 10/08/2018    LABALBU 2.9 10/04/2018    CREATININE 1.2 10/08/2018    CALCIUM 8.3 10/08/2018    GFRAA >60 10/08/2018    LABGLOM 59 10/08/2018    GLUCOSE 51 10/08/2018     PT/INR:    Lab Results   Component Value Date    PROTIME 14.2 10/08/2018    INR 1.2 10/08/2018        Current Facility-Administered Medications   Medication Dose Route Frequency Provider Last Rate Last Dose    0.9 % sodium chloride bolus  250 mL Intravenous Once Ova Dale, DO 20 mL/hr at 10/08/18 1240 250 mL at 10/08/18 1240    belladonna-opium (B&O SUPPRETTES) 16.2-30 MG suppository 60 mg  60 mg Rectal Q8H PRN Yang Nieves MD        mineral oil-hydrophilic petrolatum (AQUAPHOR) ointment   Topical BID Ova Dale, DO        cefTRIAXone (ROCEPHIN) 1 g in dextrose 5 % 50 mL IVPB (vial-mate)  1 g Intravenous Q24H Jennifer Galeana MD   Stopped at 10/07/18 1804    allopurinol (ZYLOPRIM) tablet 300 mg  300 mg Oral Lunch Jennifer Galeana MD   300 mg at 10/08/18 1144    hydrALAZINE (APRESOLINE) tablet 25 mg  25 mg Oral 3 times per day Jennifer Galeana MD   25 mg at 10/08/18 1349    insulin glargine (LANTUS) injection vial 50 Units  50 Units Subcutaneous BID Fátima Beltre MD   50 Units at 10/08/18 2443    isosorbide mononitrate (IMDUR) extended release tablet 60 mg  60 mg Oral BID Fátima Beltre MD   60 mg at 10/08/18 7478    metoprolol succinate (TOPROL XL) extended release tablet 200 mg  200 mg Oral BID Fátima Beltre MD   200 mg at 10/08/18 9075    mexiletine (MEXITIL) capsule 250 mg  250 mg Oral 3 times per day Fátima Beltre MD   250 mg at 10/08/18 1349    mirtazapine (REMERON) tablet 30 mg  30 mg Oral Nightly Fátima Beltre MD   30 mg at 10/07/18 2209    vitamin D (CHOLECALCIFEROL) tablet 1,000 Units  1,000 Units Oral Lunch Fátima Beltre MD   1,000 Units at 10/08/18 1144    sodium chloride flush 0.9 % injection 10 mL  10 mL Intravenous 2 times per day Fátima Beltre MD   10 mL at 10/04/18 0854    sodium chloride flush 0.9 % injection 10 mL  10 mL Intravenous PRN Fátima Beltre MD   10 mL at 10/05/18 1449    magnesium hydroxide (MILK OF MAGNESIA) 400 MG/5ML suspension 30 mL  30 mL Oral Daily PRN Fátima Beltre MD        ondansetron [facility]) injection 4 mg  4 mg Intravenous Q6H PRN Fátima Beltre MD        glucose (GLUTOSE) 40 % oral gel 15 g  15 g Oral PRN Fátima Beltre MD   15 g at 10/08/18 0636    dextrose 50 % solution 12.5 g  12.5 g Intravenous PRN Fátima Beltre MD        glucagon (rDNA) injection 1 mg  1 mg Intramuscular PRN Fátiam Beltre MD        dextrose 5 % solution  100 mL/hr Intravenous PRN Fátima Beltre MD        insulin lispro (HUMALOG) injection vial 0-18 Units  0-18 Units Subcutaneous TID WC Fátima Beltre MD   6 Units at 10/08/18 1144    insulin lispro (HUMALOG) injection vial 0-9 Units  0-9 Units Subcutaneous Nightly Fátima Beltre MD   2 Units at 10/07/18 2213       Problem list:    Patient Active Problem List   Diagnosis    Coronary artery disease involving native coronary artery of native heart without angina pectoris    Biventricular implantable

## 2018-10-08 NOTE — PROGRESS NOTES
Occupational Therapy    OCCUPATIONAL THERAPY DAILY NOTE    Date:10/8/2018  Patient Name: Zabrina iSmmons  MRN: [id number]  : 1940  Room: -A     Patient Active Problem List   Diagnosis    Coronary artery disease involving native coronary artery of native heart without angina pectoris    Biventricular implantable cardioverter-defibrillator in situ    Hyperlipidemia with target LDL less than 100    P[facility]    Diabetes mellitus type 2, uncontrolled ([facility]    CKD (chronic kidney disease) stage 3, GFR 30-59 ml/min (HCC)    Invasive carcinoma of urinary bladder (HCC)    Benign prostatic hyperplasia    HTN (hypertension), benign    Acute kidney injury ([facility]    Acute cystitis       Subjective:  Pt stated feeling \"weak\" today. Nurse reported pt just received blood and may feel fatigued but pt able to be seen OOB. Precautions: Falls, O2  Chart Reviewed:  Yes          Independent Supervision Contact Guard Assist Minimal Assist Moderate Assist Maximum Assist Dependent   Feeding          Grooming  SBA        UE  Bathing          LE Bathing          UE Dressing          LE  Dressing                    Toileting                   Comments:  Pt sat at EOB during grooming tasks without LOB but required vc for correct posture. Functional Transfers:  Pt performed supine to sit with Max A. Attempted STS with Max A 2xs. Nursing notified and was requested to assist in transferring pt to [facility] per pt request.  Nurse assessed pt and stated he should remain in bed until post chest Xray. Pt rolled from R to L in bed I preparation of bed pan with Mod A. Therapeutic Exercises:  Pt demoed good use of BUE during bed mobility. Other:  Pt reported fatigue during tx but stated \"feeling better\" sitting on EOB.      Education:  Hand placement during transfers    Equipment Recommendations:  Continue to assess    AM-PAC Inpatient Daily Activity Raw Score:      Pain Level: No

## 2018-10-09 PROBLEM — R31.9 URINARY TRACT INFECTION WITH HEMATURIA: Status: ACTIVE | Noted: 2018-01-01

## 2018-10-09 NOTE — PROGRESS NOTES
[facility]                                                            PROGRESS NOTE                                                                       10/9/2018          SUBJECTIVE:  Afebrile  Urine clear     OBJECTIVE:    /62   Pulse 76   Temp 98.2 °F (36.8 °C) (Oral)   Resp 22   Ht 5' 8.5\" (1.74 m)   Wt 207 lb 4 oz (94 kg)   SpO2 99%   BMI 31.05 kg/m²     PHYSICAL EXAMINATION:  Alert appears comfortable  Skin: dry, without rashes  Respirations: non-labored, intact  Abdomen: soft, non-tender, non-distended  Gray in place      Lab Results   Component Value Date    WBC 12.1 (H) 10/06/2018    HGB 8.3 (L) 10/09/2018    HCT 23.6 (L) 10/06/2018    MCV 87.4 10/06/2018     10/06/2018       Lab Results   Component Value Date    CREATININE 1.3 (H) 10/09/2018       Lab Results   Component Value Date    PSA 0.19 07/17/2018    PSA 0.93 02/11/2015       REVIEW OF SYSTEMS:    CONSTITUTIONAL: negative  HEENT: negative  HEMATOLOGIC: negative  ENDOCRINE: negative  RESPIRATORY: negative  CV: negative  GI: negative  NEURO: negative  ORTHOPEDICS: negative  PSYCHIATRIC: negative  : as above    PAST FAMILY HISTORY:    Family History   Problem Relation Age of Onset    Diabetes Father     Heart Disease Father     Alzheimer's Disease Mother      PAST SOCIAL HISTORY:    Social History     Social History    Marital status:      Spouse name: N/A    Number of children: N/A    Years of education: N/A     Social History Main Topics    Smoking status: Former Smoker     Packs/day: 2.50     Years: 15.00     Types: Cigarettes     Quit date: 7/8/1976    Smokeless tobacco: Never Used      Comment: stop 1976    Alcohol use No    Drug use: No    Sexual activity: Not Asked     Other Topics Concern    None     Social History Narrative    None       Scheduled Meds:   warfarin  3 mg Oral Daily    furosemide  40 mg Intravenous Daily    potassium chloride 20 mEq Oral Daily with breakfast    mineral oil-hydrophilic petrolatum   Topical BID    cefTRIAXone (ROCEPHIN) IV  1 g Intravenous Q24H    allopurinol  300 mg Oral Lunch    hydrALAZINE  25 mg Oral 3 times per day    insulin glargine  50 Units Subcutaneous BID    isosorbide mononitrate  60 mg Oral BID    metoprolol succinate  200 mg Oral BID    mexiletine  250 mg Oral 3 times per day    mirtazapine  30 mg Oral Nightly    vitamin D  1,000 Units Oral Lunch    sodium chloride flush  10 mL Intravenous 2 times per day    insulin lispro  0-18 Units Subcutaneous TID WC    insulin lispro  0-9 Units Subcutaneous Nightly     Continuous Infusions:   dextrose       PRN Meds:.belladonna-opium, sodium chloride flush, magnesium hydroxide, ondansetron, glucose, dextrose, glucagon (rDNA), dextrose    ASSESSMENT:    Patient Active Problem List   Diagnosis    Coronary artery disease involving native coronary artery of native heart without angina pectoris    Biventricular implantable cardioverter-defibrillator in situ    Hyperlipidemia with target LDL less than 100    Permanent atrial fibrillation (HCC)    Diabetes mellitus type 2, uncontrolled (HCC)    CKD (chronic kidney disease) stage 3, GFR 30-59 ml/min (HCC)    Invasive carcinoma of urinary bladder (HCC)    Benign prostatic hyperplasia    HTN (hypertension), benign    Acute kidney [facility]    Acute cystitis    Urinary tract infection with hematuria       PLAN:  Continue with folely   Will need stent exchange when stable    Kapil Messina M.D.  10/9/2018  1:14 PM

## 2018-10-09 NOTE — CONSULTS
Inpatient Cardiology Consultation      Reason for Consult:  Fluid Overload    Consulting Physician: Dr Akilah Gong    Requesting Physician:  Dr Addy Luong    Date of Consultation: 10/9/2018    HISTORY OF PRESENT ILLNESS: 68 yo  male known to Dr Jessica Hewitt (9/4/2018) and Dr Tong Perez (9/17/2018)  [facility] 10/3/2018 for weakness, inability to stand, poor PO intake, hematuria (comes and goes according to wife) and dizziness, with worsening dementia. Was being treated as an outpatient for UTI with Bactrim. WBC 15.2, Hgb 9.5, Na 135, K+ 5.2, Bun/Cr 46/3.0, troponin 0.03, p-BNP 9696. + UTI. /62, HR 76, paced on monitor. CT Head nothing acute but old lacunar infarct. CXR read as patchy infiltrates. Admitted with UTI, dizziness, and DANA. According to EMR last INR draw 1 week ago was 1.9. INR upon admission 3.9. Coumadin placed on hols and urology consulted for hematuria. Placed on NSS at 100ml/hr. 10/4/2018 Urology consult known L hydronephrosis with stent placement (exchanged out every 6 months last time in 6/2018)  10/4/2018 Renal US: mild L hydronephrosis asymmetric kidney size L<R. KUB: No obstruction. L sided stent intact. 10/5/2018 IVF's continued but rate decreased  10/5/2018 received Vitamin K and FFP  10/6/2018 3 way chávez placed and catheter was irrigated until clear. Manula irrigations prn. 10/7/2018: Hgb 6.7> transfused 1 unit PRBC then became wheezy, SOB, chest pressure, and dyspneic. Given 40 mg IV Lasix. Noted to be 4.5 liters positive. Stat EKG and CXR done. Troponin 's cycled   10/8/2018 Pulmonary consulted CXR pulmonary edema. IVF's STOPPED. LAsix 40 mg IV x 1 dose  10/9/2018 Lasix 20 mg IV x 1        Please note: past medical records were reviewed per electronic medical record (EMR) - see detailed reports under Past Medical/ Surgical History. Past Medical/SurgicalHistory:    1. Former heavy tobacco abuse 3 ppd x 18 yrs quit in 1977  2. CHRISTINE compliant with C-pap  3.  CKD stage III  4. T2 DM 965 ml   Output             1900 ml   Net             -935 ml       Labs:   CBC:   Recent Labs      10/08/18   1803  10/09/18   0409   HGB  8.2*  8.3*     BMP: Recent Labs      10/08/18   0603  10/09/18   0409   NA  141  142   K  4.1  3.7   CO2  22  23   BUN  20  20   CREATININE  1.2  1.3*   LABGLOM  59  54   CALCIUM  8.3*  8.7     HgA1c:   Lab Results   Component Value Date    LABA1C 7.2 (H) 10/05/2018     PT/INR:   Recent Labs      10/08/18   0603  10/09/18   0409   PROTIME  14.2*  13.6*   INR  1.2  1.2     CARDIAC ENZYMES:  Recent Labs      10/08/18   1515  10/08/18   2145  10/09/18   0409   CKTOTAL  47  22  22   TROPONINI  0.02  0.02  0.02     FASTING LIPID PANEL:  Lab Results   Component Value Date    CHOL 139 11/14/2017    HDL 37 11/14/2017    LDLCALC 86 11/14/2017    TRIG 81 11/14/2017   Electronically signed by Lobo Crane. HARRISON Chan on 10/9/2018 at 11:34 AM     I independently performed an evaluation on the patient. I have reviewed the above documentation completed by the advance practitioner. Please see my additional contributions to the HPI, physical exam, assessment and medical decision making.     See accompanying documentation for full consult.     ASSESSMENT AND PLAN:  1. CHF/ICMP:      Hydralazine/nitrate/toprol.      Gently diurese.     2. Dyspnea: See above.      3. CAD-CABG: Statin/BB/nitrate. No ASA due to warfarin.     4. Hx of VT/BiV-ICD: Follows with EP. On mexiletine.      5. Chronic Afib: Rate controlled and typically on warfarin but currently held for hematuria.     6. CKD: Follow labs.      7. DM: Per primary service.      8. HTN: Observe.      9. Lipids: Statin.      10. CHRISTINE: CPAP.     11. COPD: On oxygen.      12. Anemia: Follow labs.     13. Hx of Bladder CA/BPH     14. Dementia     15. Ulcerative colitis     Claudio Santos D.O.   Cardiologist  Cardiology, [address] [place] [address]

## 2018-10-09 NOTE — PROGRESS NOTES
108 (H) mmol/L    CO2 23 mmol/L    Anion Gap 11 mmol/L    Glucose 45 (L) mg/dL    BUN 20 mg/dL    CREATININE 1.3 (H) mg/dL    GFR Non-African American 54 mL/min/1.73    Comment: Chronic Kidney Disease: less than 60 ml/min/1.73 sq. m.         Kidney Failure: less than 15 ml/min/1.73 sq.m. Results valid for patients 18 years and older. GFR  >60    Calcium 8.7 mg/dL   Troponin [[id number]] Collected: 10/09/18 0409   Updated: 10/09/18 0456    Specimen Source: Blood     Troponin 0.02 ng/mL    Comment: TROPONIN T BLOOD LEVELS:          0.03 ng/mL     Upper Reference Limit   0.04 - 0.09 ng/mL     Possible myocardial injury       >= 0.10 ng/mL     Myocardial injury       Hemoglobin [[id number]] (Abnormal) Collected: 10/09/18 0409   Updated: 10/09/18 0442     Hemoglobin 8.3 (L) g/dL       Narrative   Patient MRN:  [id number]   : 1940   Age: 69 years   Gender: Male       Order Date:  10/8/2018 2:15 PM       EXAM: XR CHEST PORTABLE       NUMBER OF IMAGES:  1       INDICATION:  SOB after blood transfusion        Technique: AP view of the chest obtained portably on 10/8/2018 2:15 PM       Comparison: Fletcher Adorno is made to AP view of the chest dated   10/03/2018.       Findings: The cardiomediastinal silhouette is enlarged. There is   elevation of the right hemidiaphragm. There is hazy diffuse airspace   disease throughout the entire lung fields bilaterally. There is a   stable 8 mm calcified nodule in the right upper lobe. There is no   pneumothorax. The visualized osseous structures demonstrate   degenerative changes. Midline sternotomy wires and metallic   mediastinal hardware are identified.       Lines and Tubes: An implantable left-sided cardiac device is   identified, with leads terminating in satisfactory position. A   right-sided Mediport is in place, with distal tip terminating in the   mid superior vena cava.            Impression   1.  Hazy diffuse airspace disease throughout the entire lung fields   bilaterally. Recommend clinical correlation.    2. Unchanged lines and tubes as described above.           Current Facility-Administered Medications   Medication Dose Route Frequency Provider Last Rate Last Dose    warfarin (COUMADIN) tablet 3 mg  3 mg Oral Daily Sherlyn Wakefield DO   3 mg at 10/09/18 1019    furosemide (LASIX) injection 40 mg  40 mg Intravenous Daily Carilyn Stade, DO   40 mg at 10/09/18 1233    potassium chloride (KLOR-CON M) extended release tablet 20 mEq  20 mEq Oral Daily with breakfast Carilyann June, DO   20 mEq at 10/09/18 1233    belladonna-opium (B&O SUPPRETTES) 16.2-30 MG suppository 60 mg  60 mg Rectal Q8H PRN Carol Dao MD        mineral oil-hydrophilic petrolatum (AQUAPHOR) ointment   Topical BID Sherlyn Wakefield DO        cefTRIAXone (ROCEPHIN) 1 g in dextrose 5 % 50 mL IVPB (vial-mate)  1 g Intravenous Q24H Barbara Ramirez MD   Stopped at 10/08/18 1812    allopurinol (ZYLOPRIM) tablet 300 mg  300 mg Oral Lunch Barbara Ramirez MD   300 mg at 10/09/18 1232    hydrALAZINE (APRESOLINE) tablet 25 mg  25 mg Oral 3 times per day Barbara Ramirez MD   25 mg at 10/09/18 1422    insulin glargine (LANTUS) injection vial 50 Units  50 Units Subcutaneous BID Barbara Ramirez MD   50 Units at 10/08/18 2109    isosorbide mononitrate (IMDUR) extended release tablet 60 mg  60 mg Oral BID Barbara Ramirez MD   60 mg at 10/09/18 1019    metoprolol succinate (TOPROL XL) extended release tablet 200 mg  200 mg Oral BID Barbara Ramirez MD   200 mg at 10/09/18 1019    mexiletine (MEXITIL) capsule 250 mg  250 mg Oral 3 times per day Barbara Ramirez MD   250 mg at 10/09/18 1422    mirtazapine (REMERON) tablet 30 mg  30 mg Oral Nightly Barbara Ramirez MD   30 mg at 10/08/18 2109    vitamin D (CHOLECALCIFEROL) tablet 1,000 Units  1,000 Units Oral Lunch Barbara Ramirez MD   1,000 Units at 10/09/18 1233    sodium chloride flush 0.9 % injection 10 mL  10 mL Intravenous 2 times per biventricular ICD     7. Essential hypertension     8. Diabetes mellitus type II, uncontrolled     9. Partially treated urinary tract infection     10. Acute on chronic systolic heart failure      Plan:    1. Continue judicious diuresis    2. Cardiology input appreciated    3.  Resume warfarin with caution      Zamzam Noyola D.O., Ila Ferrer  2:36 PM  10/9/2018

## 2018-10-09 NOTE — CONSULTS
08/30/2017    Dr. Karoline Shell- Bypass grafts patent   Fiore Buckle Left 2010     dr Hans Christopher    CHOLECYSTECTOMY  2007    COLONOSCOPY      CORONARY ARTERY BYPASS GRAFT  2005    cabg dr Argelia Liang  x 2    CYSTOSCOPY  03/07/2016    with clot evacuation    CYSTOSCOPY Left 11/02/2016    Stent Exchange    CYSTOSCOPY Left 03/29/2017    cysto L stent change digital rectal exam     CYSTOSCOPY  07/10/2017    retrogrades,peylogram, left stent exchange    CYSTOSCOPY Left 12/11/2017    retrotgrade  left stent    DILATATION, ESOPHAGUS  2006    ECHO COMPL W DOP COLOR FLOW  7/14/2013         EYE SURGERY      gian lense implants    AR CYSTOURETHROSCOPY,URETER CATHETER Left 6/8/2018    CYSTOSCOPY RETROGRADE PYELOGRAM LEFT STENT CHANGE +++IV DYE ALLERGY+++ performed by Oliva Thompson DO at [place] [address] TURP  1/20/16    URETER STENT PLACEMENT Left 07/08/2016       Review of Systems:    · Constitutional: As noted in the HPI. Denies fever or chills. · Eyes: No visual changes or diplopia. No scleral icterus. · ENT: No headaches, hearing loss or vertigo. No nasal congestion, or sore throat. · Cardiovascular: No chest pain, dyspnea on exertion, or palpitations. · Respiratory: See above  · Gastrointestinal: No abdominal pain, nausea or emesis. No diarrhea or rectal bleeding or melena. No change in bowel habits. · Genitourinary: No dysuria, urinary frequency, or incontinence. No hematuria. · Musculoskeletal: No gait disturbance, weakness or joint complaints. · Integumentary: No rash or pruritis. No abnormal pigmentation, hair or nail changes. · Neurological: No headache, diplopia, dizziness, tremor, change in muscle strength, numbness or tingling. No change in gait, balance, coordination, mood, affect, memory, mentation, behavior. · Psychiatric: No anxiety or depression.   · Endocrine: No temperature intolerance, excessive thirst, fluid intake, urinary frequency, rub.    Abd: Non-tender. Non-distended. No masses. No organmegaly. Normal bowel sounds. Skin: Warm and dry. No nodule on exposed extremities. No rash on exposed extremities. Lymph: No cervical LAD. No supraclavicular LAD. Ext: No joint deformity. No clubbing. No cyanosis. No edema  Neuro: Awake. Follows commands. Positive pupils/gag/corneals. Normal pain response. Lab Results:  CBC: Recent Labs      10/06/18   0525   10/07/18   1828  10/08/18   0603  10/08/18   1803   WBC  12.1*   --    --    --    --    HGB  7.2*   < >  8.2*  7.1*  8.2*   HCT  23.6*   --    --    --    --    MCV  87.4   --    --    --    --    PLT  254   --    --    --    --     < > = values in this interval not displayed. BMP:  Recent Labs      10/06/18   0525  10/07/18   0615  10/08/18   0603   NA  138  143  141   K  4.0  4.0  4.1   CL  109*  114*  112*   CO2  22  22  22   BUN  29*  26*  20   CREATININE  1.5*  1.5*  1.2    ALB:3,BILIDIR:3,BILITOT:3,ALKPHOS:3)@    PT/INR:   Recent Labs      10/06/18   0525  10/07/18   0615  10/08/18   0603   PROTIME  17.1*  12.6*  14.2*   INR  1.5  1.1  1.2       Cultures:  Sputum: not available  Blood: not available    ABG:   Recent Labs      10/08/18   1710   PH  7.478*   PO2  94.7   PCO2  31.0*   HCO3  22.5   BE  -0.6   O2SAT  97.4   METHB  0.1   O2HB  96.6   COHB  0.7   O2CON  12.5   HHB  2.6   THB  9.1*             Films:     XR CHEST PORTABLE   Final Result   1. Hazy diffuse airspace disease throughout the entire lung fields   bilaterally. Recommend clinical correlation. 2. Unchanged lines and tubes as described above. US RETROPERITONEAL COMPLETE   Final Result   1. No evidence of perinephric collections, renal calculi or solid   renal masses. 2. Mild left sided hydronephrosis. 3. Asymmetric kidney size with the left kidney smaller than the right   kidney.    4. Simple left renal cyst.            XR ABDOMEN (KUB) (SINGLE AP VIEW)   Final Result   Nonspecific bowel gas pattern without

## 2018-10-10 NOTE — PROGRESS NOTES
less than 60 ml/min/1.73 sq. m.         Kidney Failure: less than 15 ml/min/1.73 sq.m. Results valid for patients 18 years and older.         GFR African American >60    Calcium 8.4 (L) mg/dL   POCT Glucose [[id number]] (Abnormal) Collected: 10/10/18 0638   Updated: 10/10/18 0641     Meter Glucose 60 (L) mg/dL     Urine Culture, Routine 10/03/2018  4:21 PM [facility]   Organism  (Abnormal) 10/03/2018  4:21 PM [facility]   Candida albicans     Urine Culture, Routine 10/03/2018  4:21 PM  - 12[facility]   75,000 CFU/ml          Current Facility-Administered Medications   Medication Dose Route Frequency Provider Last Rate Last Dose    fluconazole (DIFLUCAN) 200 mg in 0.9 % NaCl 100 mL premix IVPB  200 mg Intravenous Q24H Jono A Jay,  mL/hr at 10/10/18 1142 200 mg at 10/10/18 1142    warfarin (COUMADIN) tablet 3 mg  3 mg Oral Daily Citlali Single, DO   3 mg at 10/09/18 1019    furosemide (LASIX) injection 40 mg  40 mg Intravenous Daily Hope Galvin DO   40 mg at 10/10/18 8842    potassium chloride (KLOR-CON M) extended release tablet 20 mEq  20 mEq Oral Daily with breakfast Hope Galvin DO   20 mEq at 10/10/18 0835    belladonna-opium (B&O SUPPRETTES) 16.2-30 MG suppository 60 mg  60 mg Rectal Q8H PRN Lex Rivera MD        mineral oil-hydrophilic petrolatum (AQUAPHOR) ointment   Topical BID Citlali Single, DO        cefTRIAXone (ROCEPHIN) 1 g in dextrose 5 % 50 mL IVPB (vial-mate)  1 g Intravenous Q24H Marge Pugh MD   Stopped at 10/09/18 1745    allopurinol (ZYLOPRIM) tablet 300 mg  300 mg Oral Lunch Marge Pugh MD   300 mg at 10/10/18 1141    hydrALAZINE (APRESOLINE) tablet 25 mg  25 mg Oral 3 times per day Marge Pugh MD   25 mg at 10/10/18 0643    insulin glargine (LANTUS) injection vial 50 Units  50 Units Subcutaneous BID Marge Pugh MD   Stopped at 10/09/18 2137    isosorbide mononitrate (IMDUR) extended release tablet 60 mg  60 mg Oral

## 2018-10-10 NOTE — PROGRESS NOTES
Physical Therapy  Facility/Department: [address] MED SURG  Daily Treatment Note  NAME: Monica Kemp  : 1940  MRN: [id number]    Date of Service: 10/10/2018    Patient Diagnosis(es): The primary encounter diagnosis was Urinary tract infection with hematuria, site unspecified. Diagnoses of DANA (acute kidney injury) ([address] and Dizziness were also pertinent to this visit. has a past medical history of Arthritis; CAD (coronary artery disease); Cardiac defibrillator in place; CHF (congestive heart failure) ([address] Dementia; Diabetes mellitus ([address] Dizziness; Dribbling of urine; Hematuria; HFrEF (heart failure with reduced ejection fraction) ([address] History of blood transfusion; [place] (hard of hearing); Hypertension; Invasive carcinoma of urinary bladder ([address] Ischemic cardiomyopathy; CHRISTINE treated with BiPAP; Oxygen dependent; Paroxysmal atrial fibrillation ([address] and Ventricular tachycardia ([address] has a past surgical history that includes Dilatation, esophagus (); Cholecystectomy (); Tonsillectomy; Colonoscopy; ECHO Compl W Dop Color Flow (2013); Cystocopy (x 2); TURP (16); Cystocopy (2016); Ureter stent placement (Left, 2016); Cystocopy (Left, 2016); Coronary artery bypass graft (); ablation of dysrhythmic focus (2017); Cystocopy (Left, 2017); Abdomen surgery (); eye surgery; Cystoscopy (07/10/2017); Cystocopy (Left, 2017); Cardiac defibrillator placement (Left,  ); Cardiac catheterization (2017); and pr cystourethroscopy,ureter catheter (Left, 2018). Evaluating Therapist: Seamus Doshi. Melissa Garcia PFranciscaT.        Room #: 9319/7672-K  DIAGNOSIS: UTI  PRECAUTIONS: Falls, bed/chair alarm     Social:  Pt lives with wife in a 2 floor plan 3 steps and 1 rail to enter and his bed and bath are on the first floor.   Prior to admission pt walked with ww and uses 3L home O2                Initial Evaluation  Date: 10/4/18 Treatment     10/10 Short Term/

## 2018-10-10 NOTE — PROGRESS NOTES
Occupational Therapy  OCCUPATIONAL THERAPY DAILY NOTE    Date:10/10/2018  Patient Name: Alana Monreal  MRN: [id number]  : 1940  Room: -A     Patient Active Problem List   Diagnosis    Coronary artery disease involving native coronary artery of native heart without angina pectoris    Biventricular implantable cardioverter-defibrillator in situ    Hyperlipidemia with target LDL less than 100    [facility]    Diabetes mellitus type 2, uncontrolled ([facility]    CKD (chronic kidney disease) stage 3, GFR 30-59 ml/min (HCC)    Invasive carcinoma of urinary bladder (HCC)    Benign prostatic hyperplasia    HTN (hypertension), benign    [facility]    Acute cystitis    Urinary tract infection with hematuria       Subjective:  Pt seen this AM and PM.   Precautions: falls  Chart Reviewed:  Yes          Independent Supervision Contact Guard Assist Minimal Assist Moderate Assist Maximum Assist Dependent   Feeding          Grooming          UE  Bathing          LE Bathing          UE Dressing          LE  Dressing                x    Toileting               x    Comments:  Max A for toilet hygiene. Cues for direction following for all ADL activity. Wife present in the AM.  Pt constantly calling for his wife then asking her what he should do next. Functional Transfers:  Supine to sit onto the side of the bed max A. Sit balance on the EOB CGA. Sit to stand attempted several times with pt providing no assist to stand and leaning backwards to counteract assistance being provided by therapist.  Pt stating several times that he is afraid of falling. PT arrived to the room. Sit to stand completed from bed and toilet surfaces with max A x2. Functional mobility to and from bathroom using wheeled walker for support min A and assist to guide walker. Pt requiring step by step directions to step up into the walker during mobility.     Returned to room in PM. Pt chair alarm activating. Pt found in doorway of the bathroom with no adaptive device. Pt yelling that he was afraid he was going to fall. Nursing aide and therapist assisted to get pt walker and complete mobility to the toilet. Therapeutic Exercises:  Limited use of UE's during functional activity. Other:  Pt level of assistance inconsistent. Poor safety awareness. Assist with ADL and transfers. Wife present during AM session however not in room in the PM.     Education:  Daily orientation, walker and transfer safety    Equipment Recommendations:  Continue to assess     AM-PAC Inpatient Daily Activity Raw Score:  14    Pain Level: /10   Additional Notes:  Pt fearful of falling. Patient has made  progress during treatment sessions toward set goals. [x] Continue with current OT Plan of care.   [] Prepare for Discharge    Stephan Bryant ELIECER/L [id number]    Total Tx Time: 43

## 2018-10-10 NOTE — PROGRESS NOTES
IVPB (vial-mate)  1 g Intravenous Q24H Lauri Mckeon MD   Stopped at 10/09/18 1745    allopurinol (ZYLOPRIM) tablet 300 mg  300 mg Oral Lunch Lauri Mckeon MD   300 mg at 10/09/18 1232    hydrALAZINE (APRESOLINE) tablet 25 mg  25 mg Oral 3 times per day Lauri Mckeon MD   25 mg at 10/10/18 4405    insulin glargine (LANTUS) injection vial 50 Units  50 Units Subcutaneous BID Lauri Mckeon MD   Stopped at 10/09/18 2137    isosorbide mononitrate (IMDUR) extended release tablet 60 mg  60 mg Oral BID Lauri Mckeon MD   60 mg at 10/10/18 2842    metoprolol succinate (TOPROL XL) extended release tablet 200 mg  200 mg Oral BID Lauri Mckeon MD   200 mg at 10/10/18 1291    mexiletine (MEXITIL) capsule 250 mg  250 mg Oral 3 times per day Lauri Mckeon MD   250 mg at 10/10/18 5694    mirtazapine (REMERON) tablet 30 mg  30 mg Oral Nightly Lauri Mckeon MD   30 mg at 10/09/18 2116    vitamin D (CHOLECALCIFEROL) tablet 1,000 Units  1,000 Units Oral Lunch Lauri Mckeon MD   1,000 Units at 10/09/18 1233    sodium chloride flush 0.9 % injection 10 mL  10 mL Intravenous 2 times per day Lauri Mckeon MD   10 mL at 10/10/18 0835    sodium chloride flush 0.9 % injection 10 mL  10 mL Intravenous PRN Lauri Mckeon MD   10 mL at 10/09/18 1715    magnesium hydroxide (MILK OF MAGNESIA) 400 MG/5ML suspension 30 mL  30 mL Oral Daily PRMIKE Mckeon MD        [facility] injection 4 mg  4 mg Intravenous Q6H PRMIKE Mckeon MD        glucose (GLUTOSE) 40 % oral gel 15 g  15 g Oral PRN Lauri Mckeon MD   15 g at 10/08/18 0636    dextrose 50 % solution 12.5 g  12.5 g Intravenous PRN Lauri Mckeon MD   12.5 g at 10/09/18 0530    glucagon (rDNA) injection 1 mg  1 mg Intramuscular PRN Lauri Mckeon MD        dextrose 5 % solution  100 mL/hr Intravenous TREMAYNE Mckeon MD        insulin lispro (HUMALOG) injection vial 0-18 Units  0-18 Units Subcutaneous TID  Lauri Mckeon MD   3 Units at 10/09/18 1702    insulin lispro (HUMALOG) injection vial 0-9 Units  0-9 Units Subcutaneous Nightly Barbara Ramirez MD   2 Units at 10/07/18 2213       Review of systems:   Heart: as above   Lungs: as above   Eyes: denies changes in vision or discharge. Ears: denies changes in hearing or pain. Nose: denies epistaxis or masses   Throat: denies sore throat or trouble swallowing. Neuro: denies numbness, tingling, tremors. Skin: denies rashes or itching. : denies hematuria, dysuria   GI: denies vomiting, diarrhea   Psych: denies mood changed, anxiety, depression. Physical Exam   /62   Pulse 74   Temp 98.6 °F (37 °C) (Oral)   Resp 18   Ht 5' 8.5\" (1.74 m)   Wt 203 lb 6 oz (92.3 kg)   SpO2 96%   BMI 30.47 kg/m²   Constitutional: Oriented to person, place, and time. No distress. Well developed. Head: Normocephalic and atraumatic. Neck: Neck supple. No hepatojugular reflux. No JVD present. Carotid bruit is not present. No tracheal deviation present. No thyromegaly present. Cardiovascular: Normal rate, regular rhythm, normal heart sounds. intact distal pulses. No gallop and no friction rub. No murmur heard. Pulmonary: Breath sounds normal. No respiratory distress. No wheezes. No rales. Chest: Effort normal. No tenderness. Abdominal: Soft. Bowel sounds are normal. No distension or mass. No tenderness, rebound or guarding. Musculoskeletal: . No tenderness. No clubbing or cyanosis. Extremitites: Intact distal pulses. No edema  Neurological: Alert and oriented to person, place, and time. Skin: Skin is warm and dry. No rash noted. Not diaphoretic. No erythema. Psychiatric: Normal mood and affect. Behavior is normal.   Lymphadenopathy: No cervical adenopathy. No groin adenopathy.       CBC:   Lab Results   Component Value Date    WBC 11.5 10/10/2018    RBC 3.06 10/10/2018    HGB 8.2 10/10/2018    HCT 26.7 10/10/2018    MCV 87.3 10/10/2018    MCH 26.8 10/10/2018    MCHC 30.7

## 2018-10-11 NOTE — PROGRESS NOTES
Pt's chávez catheter was removed at 1000.  Electronically signed by Que Argueta RN on 10/11/2018 at 10:02 AM

## 2018-10-11 NOTE — PROGRESS NOTES
[facility] Cardiology Inpatient Progress Note    Patient is a 69 y.o. male of Sarika Perry MD seen in hospital follow up. Chief complaint: Chest pressure/SOB/CHF/Afib     HPI: No CP or SOB.      Patient Active Problem List   Diagnosis    Coronary artery disease involving native coronary artery of native heart without angina pectoris    Biventricular implantable cardioverter-defibrillator in situ    Hyperlipidemia with target LDL less than 100    Permanent atrial fibrillation (HCC)    Diabetes mellitus type 2, uncontrolled [facility]    CKD (chronic kidney disease) stage 3, GFR 30-59 ml/min (HCC)    Invasive carcinoma of urinary bladder (HCC)    Benign prostatic hyperplasia    HTN (hypertension), benign    Acute kidney injury ([facility]    Acute cystitis    Urinary tract infection with hematuria       Allergies   Allergen Reactions    Multaq [Dronedarone] Shortness Of Breath    Amiodarone Other (See Comments)     Difficulty breathing    Bactrim [Sulfamethoxazole-Trimethoprim] Itching    Iv Dye [Iodides] Hives and Itching    Pollen Extract Other (See Comments)     Seasonal allergies    Tikosyn [Dofetilide] Diarrhea and Nausea And Vomiting    Doxycycline Rash       Current Facility-Administered Medications   Medication Dose Route Frequency Provider Last Rate Last Dose    fluconazole (DIFLUCAN) 200 mg in 0.9 % NaCl 100 mL premix IVPB  200 mg Intravenous Q24H Jono A Jay,  mL/hr at 10/10/18 1142 200 mg at 10/10/18 1142    warfarin (COUMADIN) tablet 3 mg  3 mg Oral Daily Marisol Hockey, DO   3 mg at 10/10/18 1748    furosemide (LASIX) injection 40 mg  40 mg Intravenous Daily Denisse Brown, DO   40 mg at 10/10/18 0835    potassium chloride (KLOR-CON M) extended release tablet 20 mEq  20 mEq Oral Daily with breakfast Denisse Genera, DO   20 mEq at 10/10/18 0835    belladonna-opium (B&O SUPPRETTES) 16.2-30 MG suppository 60 mg  60 mg Rectal Q8H PRN Pascale Henderson MD        mineral

## 2018-10-11 NOTE — PROGRESS NOTES
Physical Therapy  Facility/Department: [address] MED SURG  Daily Treatment Note  NAME: Lilly Anguiano  : 1940  MRN: [id number]    Date of Service: 10/11/2018    Patient Diagnosis(es): The primary encounter diagnosis was Urinary tract infection with hematuria, site unspecified. Diagnoses of DANA (acute kidney injury) ([address] and Dizziness were also pertinent to this visit. has a past medical history of Arthritis; CAD (coronary artery disease); Cardiac defibrillator in place; CHF (congestive heart failure) ([address] Dementia; Diabetes mellitus ([address] Dizziness; Dribbling of urine; Hematuria; HFrEF (heart failure with reduced ejection fraction) ([address] History of blood transfusion; [place] (hard of hearing); Hypertension; Invasive carcinoma of urinary bladder ([address] Ischemic cardiomyopathy; CHRISTINE treated with BiPAP; Oxygen dependent; Paroxysmal atrial fibrillation ([address] and Ventricular tachycardia ([address] has a past surgical history that includes Dilatation, esophagus (); Cholecystectomy (); Tonsillectomy; Colonoscopy; ECHO Compl W Dop Color Flow (2013); Cystocopy (x 2); TURP (16); Cystocopy (2016); Ureter stent placement (Left, 2016); Cystocopy (Left, 2016); Coronary artery bypass graft (); ablation of dysrhythmic focus (2017); Cystocopy (Left, 2017); Abdomen surgery (); eye surgery; Cystoscopy (07/10/2017); Cystocopy (Left, 2017); Cardiac defibrillator placement (Left,  ); Cardiac catheterization (2017); and pr cystourethroscopy,ureter catheter (Left, 2018). Evaluating Therapist: Hugo Mckeon. Radha Magallon P.T.        Room #: 7833/3407-V  DIAGNOSIS: UTI  PRECAUTIONS: Falls, bed/chair alarm, TSM     Social:  Pt lives with wife in a 2 floor plan 3 steps and 1 rail to enter and his bed and bath are on the first floor.   Prior to admission pt walked with ww and uses 3L home O2                       Initial Evaluation  Date: 10/4/18 Treatment     10/11

## 2018-10-11 NOTE — PROGRESS NOTES
congestion and minimal  atelectasis and tiny pleural effusions in lung bases. There is  elevation of the right hemidiaphragm. A left subclavian pacemaker and  right IJ MediPort are unchanged. A calcified nodule is identified in  the right upper lobe. Impression:     Mild CHF with  marginal improvement. There is no edema. Protime-INR [[id number]] (Abnormal) Collected: 10/11/18 0540   Updated: 10/11/18 0622    Specimen Source: Blood     Protime 15.2 (H) sec    INR 1.3   Basic metabolic panel [[id number]] Collected: 10/11/18 0540   Updated: 10/11/18 0616    Specimen Source: Blood     Sodium 143 mmol/L    Potassium 4.0 mmol/L    Chloride 104 mmol/L    CO2 29 mmol/L    Anion Gap 10 mmol/L    Glucose 85 mg/dL    BUN 19 mg/dL    CREATININE 1.2 mg/dL    GFR Non-African American 59 mL/min/1.73    Comment: Chronic Kidney Disease: less than 60 ml/min/1.73 sq. m.         Kidney Failure: less than 15 ml/min/1.73 sq.m. Results valid for patients 18 years and older.         GFR African American >60    Calcium 9.3 mg/dL   POCT Glucose [[id number]] Collected: 10/11/18 0553   Updated: 10/11/18 0555     Meter Glucose 91 mg/dL   Hemoglobin [[id number]] (Abnormal) Collected: 10/11/18 0540   Updated: 10/11/18 0554    Specimen Source: Blood     Hemoglobin 9.0 (L) g/dL         Current Facility-Administered Medications   Medication Dose Route Frequency Provider Last Rate Last Dose    [START ON 10/12/2018] insulin glargine (LANTUS) injection vial 25 Units  25 Units Subcutaneous Daily Marisol Vela,         fluconazole (DIFLUCAN) 200 mg in 0.9 % NaCl 100 mL premix IVPB  200 mg Intravenous Q24H Jono A Jay,  mL/hr at 10/11/18 1042 200 mg at 10/11/18 1042    furosemide (LASIX) injection 40 mg  40 mg Intravenous Daily Denisse Genera, DO   40 mg at 10/11/18 1041    potassium chloride (KLOR-CON M) extended release tablet 20 mEq  20 mEq Oral Daily with breakfast Denisse Genera, DO   20 mEq at 10/11/18 1040    belladonna-opium (B&O

## 2018-10-12 PROBLEM — N32.89 BLADDER SPASMS: Status: ACTIVE | Noted: 2018-01-01

## 2018-10-12 NOTE — PROGRESS NOTES
Paged urology regarding discharge.   Electronically signed by Kristy Jackson RN on 10/12/2018 at 5:23 PM

## 2018-10-12 NOTE — DISCHARGE SUMMARY
most of the history. He does have a history of bladder cancer and has had increasing hematuria for the past 2 weeks and at the time of admission was passing clots. He was recently placed on oral Bactrim for suspected urinary tract infection. He is chronically anticoagulated for atrial fibrillation but his INR last week was only 1.9. He denies any syncope chest pain shortness of breath abdominal pain nausea or vomiting. In spite of his weakness and dizziness he has fortunately not suffered any mechanical falls.     He was admitted here in June with gross hematuria which was felt to be due to a combination of his bladder cancer, radiation therapy, stent and anticoagulation.     He does have an extensive cardiac history with cardiac defibrillator in place for ischemic cardiomyopathy. He is on a daily dose of diuretic as well as potassium supplement. He was initially placed on intravenous fluids and continuous  irrigation. He required transfusion. He did develop shortness of breath secondary to pulmonary edema from aggressive hydration. He improved with diuresis. He was seen by cardiology and pulmonology. His anticoagulants were held and will continue to be held until cleared by urology to resume. He was seen by physical therapy and rehab as recommended. His hematuria eventually resolved and urinary catheter was removed and he was discharged to subacute rehab in stable condition. He will follow-up with urology in November for stent change.     Discharge Exam:  See progress note from today    Disposition: [facility]    Patient Instructions:   Current Discharge Medication List      START taking these medications    Details   !! insulin lispro (HUMALOG) 100 UNIT/ML injection vial Inject 0-18 Units into the skin 3 times daily (with meals)  Qty: 1 vial, Refills: 3      !! insulin lispro (HUMALOG) 100 UNIT/ML injection vial Inject 0-9 Units into the skin nightly  Qty: 1 vial, Refills: 3      belladonna-opium (B&O SUPPRETTES) 16.2-30 MG suppository Place 1 suppository rectally every 8 hours as needed for Pain for up to 5 days. Farhan Cole: 15 suppository, Refills: 0    Associated Diagnoses: Bladder spasms      glucagon, rDNA, 1 MG injection Inject 1 mg into the muscle as needed for Low blood sugar (Blood glucose less than 70 mg/dL and patient NOT ALERT or NPO and does not have IV access.)      mineral oil-hydrophilic petrolatum (AQUAPHOR) ointment Apply topically as needed. !! - Potential duplicate medications found. Please discuss with provider. CONTINUE these medications which have CHANGED    Details   insulin glargine (LANTUS) 100 UNIT/ML injection vial Inject 25 Units into the skin daily  Qty: 1 vial, Refills: 3      bumetanide (BUMEX) 2 MG tablet Take 1 tablet by mouth daily         CONTINUE these medications which have NOT CHANGED    Details   mirtazapine (REMERON) 30 MG tablet Take 30 mg by mouth nightly      mexiletine (MEXITIL) 250 MG capsule TAKE 1 CAPSULE THREE TIMES DAILY  Qty: 270 capsule, Refills: 3      vitamin D (CHOLECALCIFEROL) 1000 UNIT TABS tablet Take 1,000 Units by mouth Daily with lunch       metoprolol succinate (TOPROL XL) 200 MG extended release tablet Take 1 tablet by mouth 2 times daily  Qty: 60 tablet, Refills: 3      potassium chloride (KLOR-CON M) 20 MEQ extended release tablet Take 20 mEq by mouth 2 times daily      allopurinol (ZYLOPRIM) 300 MG tablet Take 300 mg by mouth Daily with lunch       isosorbide mononitrate (IMDUR) 60 MG CR tablet Take 60 mg by mouth 2 times daily       OXYGEN Inhale 3 L/min into the lungs continuous       hydrALAZINE (APRESOLINE) 25 MG tablet Take 1 tablet by mouth every 8 hours.   Qty: 90 tablet, Refills: 0      azelastine (ASTELIN) 0.1 % nasal spray 1 spray by Nasal route 2 times daily as needed for Rhinitis          STOP taking these medications       warfarin (COUMADIN) 3 MG tablet Comments:   Reason for Stopping:         warfarin (COUMADIN) 3 MG tablet Comments: Reason for Stopping:         sulfamethoxazole-trimethoprim (BACTRIM DS;SEPTRA DS) 800-160 MG per tablet Comments:   Reason for Stopping:         metolazone (ZAROXOLYN) 2.5 MG tablet Comments:   Reason for Stopping:         aspirin 81 MG chewable tablet Comments:   Reason for Stopping:         insulin aspart (NOVOLOG) 100 UNIT/ML injection vial Comments:   Reason for Stopping:             Activity: activity as tolerated  Diet: diabetic diet    Follow-up with Dr Willow Sneed in 1 month. Note that over 30 minutes was spent in preparing discharge papers, discussing discharge with patient, medication review, etc.    Signed:  ELSY Hennessy  10/12/2018  1:54 PM

## 2018-10-12 NOTE — PROGRESS NOTES
Called Nurse to Nurse to Bernadette Olea at [facility]. Will fax paperwork to [phone].  Electronically signed by Massie Skiff, RN on 10/12/2018 at 6:10 PM

## 2018-10-12 NOTE — PROGRESS NOTES
Perfect served Urology in regards to Discharge and verification of antibiotics.  Electronically signed by Sohail Real RN on 10/12/2018 at 3:57 PM

## 2018-10-12 NOTE — CARE COORDINATION
Social Work discharge planning  Pt to [facility] skilled today at 6p via [facility]. Sw spoke to [employer] at [employer]. Pt's wife, charge RN Álvaro Blair, and Susana Mckeon with [facility].   Electronically signed by Lance Ryder on 10/12/2018 at 2:54 PM

## 2018-10-12 NOTE — PROGRESS NOTES
Pulmonary Progress Note    Admit Date: 10/3/2018  Hospital day                               PCP: aMuro De La Torre MD    Chief Complaint (s):  Patient Active Problem List   Diagnosis    Coronary artery disease involving native coronary artery of native heart without angina pectoris    Biventricular implantable cardioverter-defibrillator in situ    Hyperlipidemia with target LDL less than 100    Permanent [facility] fibrillation ([facility]    Diabetes mellitus type 2, uncontrolled ([facility]    CKD (chronic kidney disease) stage 3, GFR 30-59 ml/min (HCC)    Invasive carcinoma of urinary bladder (HCC)    Benign prostatic hyperplasia    HTN (hypertension), benign    [facility]    Acute cystitis    Urinary tract infection with hematuria    Bladder spasms       Subjective:  - Up in a chair, breathing easily this p.m. Awaiting discharge.       Vitals:  VITALS:  /82   Pulse 75   Temp 98.3 °F (36.8 °C) (Oral)   Resp 16   Ht 5' 8.5\" (1.74 m)   Wt 203 lb 9.6 oz (92.4 kg)   SpO2 94%   BMI 30.51 kg/m²     24HR INTAKE/OUTPUT:      Intake/Output Summary (Last 24 hours) at 10/12/18 1533  Last data filed at 10/12/18 1254   Gross per 24 hour   Intake              780 ml   Output                0 ml   Net              780 ml       24HR PULSE OXIMETRY RANGE:    SpO2  Av %  Min: 94 %  Max: 96 %    Medications:  IV:   dextrose         Scheduled Meds:   insulin glargine  25 Units Subcutaneous Daily    fluconazole  200 mg Intravenous Q24H    furosemide  40 mg Intravenous Daily    potassium chloride  20 mEq Oral Daily with breakfast    mineral oil-hydrophilic petrolatum   Topical BID    allopurinol  300 mg Oral Lunch    hydrALAZINE  25 mg Oral 3 times per day    isosorbide mononitrate  60 mg Oral BID    metoprolol succinate  200 mg Oral BID    mexiletine  250 mg Oral 3 times per day    mirtazapine  30 mg Oral Nightly    vitamin D  1,000 Units Oral Lunch    sodium chloride flush 10 mL Intravenous 2 times per day    insulin lispro  0-18 Units Subcutaneous TID WC    insulin lispro  0-9 Units Subcutaneous Nightly       Diet:   DIET CARB CONTROL; Low Sodium (2 GM); Lactose controlled     EXAM:  General: No distress. Eyes: PERRL. No sclera icterus. No conjunctival injection. ENT: No discharge. Pharynx clear. Neck: Trachea midline. Normal thyroid. Resp: No accessory muscle use. No rales. No wheezing. No rhonchi. CV: Regular rate. Regular rhythm. No murmur or rub. Abd: Non-tender. Non-distended. No masses. No organomegaly. Normal bowel sounds. Skin: Warm and dry. No nodule on exposed extremities. No rash on exposed extremities. Ext: No cyanosis, clubbing, edema  Lymph: No cervical LAD. No supraclavicular LAD. M/S: No cyanosis. No joint deformity. No clubbing. Neuro: Positive pupils/gag/corneals. Normal pain response. Results:  CBC:   Recent Labs      10/10/18   0640  10/10/18   1858  10/11/18   0540  10/12/18   0604   WBC  11.5   --    --   12.5*   HGB  8.2*  9.4*  9.0*  8.7*   HCT  26.7*   --    --   27.8*   MCV  87.3   --    --   87.4   PLT  249   --    --   266     BMP:   Recent Labs      10/10/18   0640  10/11/18   0540  10/12/18   0604   NA  141  143  139   K  3.9  4.0  3.9   CL  108*  104  103   CO2  26  29  27   BUN  20  19  22   CREATININE  1.3*  1.2  1.3*     LIVER PROFILE: No results for input(s): AST, ALT, LIPASE, BILIDIR, BILITOT, ALKPHOS in the last 72 hours. Invalid input(s): AMYLASE,  ALB  PT/INR:   Recent Labs      10/10/18   0640  10/11/18   0540  10/12/18   0604   PROTIME  14.5*  15.2*  15.4*   INR  1.3  1.3  1.3     APTT: No results for input(s): APTT in the last 72 hours. Pathology:  1. N/A      Microbiology:  1. None    Recent ABG:   No results for input(s): PH, PO2, PCO2, HCO3, BE, O2SAT, METHB, O2HB, COHB, O2CON, HHB, THB in the last 72 hours. Recent Films:  XR CHEST PORTABLE   Final Result   Mild CHF with  marginal improvement.  There

## 2018-10-29 NOTE — PATIENT INSTRUCTIONS
Continue to maintain a low salt diet. Monitor daily weights. Elevate legs when at rest.      Patient Education        Limiting Sodium and Fluids With Heart Failure: Care Instructions  Your Care Instructions    Sodium causes your body to hold on to extra water. This may cause your heart failure symptoms to get worse. Limiting sodium may help you feel better and lower your risk of having to go to the hospital.  People get most of their sodium from processed foods. Fast food and restaurant meals also tend to be very high in sodium. Your doctor may suggest that you limit sodium to 2,000 milligrams (mg) a day or less. That is less than 1 teaspoon of salt a day, including all the salt you eat in cooked or packaged foods. Usually, you have to limit the amount of liquids you drink only if your heart failure is severe. Limiting sodium alone often is enough to help your body get rid of extra fluids. However, your doctor may tell you to limit your fluid intake to a set amount each day. Follow-up care is a key part of your treatment and safety. Be sure to make and go to all appointments, and call your doctor if you are having problems. It's also a good idea to know your test results and keep a list of the medicines you take. How can you care for yourself at home? Read food labels  · Read food labels on cans and food packages. The labels tell you how much sodium is in each serving. Make sure that you look at the serving size. If you eat more than the serving size, you have eaten more sodium than is listed for one serving. · Food labels also tell you the Percent Daily Value. If the Percent Daily Value says 50%, it means that you will get at least 50% of all the sodium you need for the entire day in one serving. Choose products with low Percent Daily Values for sodium. · Be aware that sodium can come in forms other than salt, including monosodium glutamate (MSG), sodium citrate, and sodium bicarbonate (baking soda). under license by [facility] ([facility]). If you have questions about a medical condition or this instruction, always ask your healthcare professional. [address] any warranty or liability for your use of this information.

## 2018-10-29 NOTE — PROGRESS NOTES
[facility]        Dear Dr. Andrew Liu MD,    We had the pleasure of seeing Pierce Saunders in the [facility] office. His primary cardiologist is Dr. Jordan Huang.    Chief Complaint: HFU for CAF, CAD, ICD, ICM  Chief Complaint   Patient presents with    Follow-up    Coronary Artery Disease    Hypertension    Congestive Heart Failure       Date of Service: 10/29/2018    HISTORY OF PRESENT ILLNESS:    Pierce Saunders is a pleasant 69 y.o. male with a past medical history below. Patient had hospitalization 10/3/18 for weakness, poor po intake, anemia, and hematuria. He required IVF, Vit K, FFP, and 1 unit PRBCs. 10/8/18, CXR noted pulmonary edema and he was noted 4.5 L positive. He was initiated on IV diuresis. His Coumadin was on hold as per urology; however, he has been resumed back on Coumadin and his dosing has been as per [employer]. Today, he is resting comfortably in his wheelchair. He denies any current chest pain, SOB, palpitations, or dizziness. He denies any recent orthopnea or PND. He admits to mild ankle edema only and admits to being more sedentary recently. His weight is down 8-9 # from discharge. He denies any current bleeding issues. His wife is present during exam.     ALLERGIES:    Allergies   Allergen Reactions    Multaq [Dronedarone] Shortness Of Breath    Amiodarone Other (See Comments)     Difficulty breathing    Bactrim [Sulfamethoxazole-Trimethoprim] Itching    Iv Dye [Iodides] Hives and Itching    Pollen Extract Other (See Comments)     Seasonal allergies    Tikosyn [Dofetilide] Diarrhea and Nausea And Vomiting    Doxycycline Rash       PROBLEM LIST:   Patient Active Problem List    Diagnosis Date Noted    Invasive carcinoma of urinary bladder ([facility] [address] 02/13/2015     Priority: High     Overview Note:     1. Pending Port placement and initiation of CTX/Radiation therapy.       Biventricular implantable cardioverter-defibrillator in situ 3L/nc cont    Paroxysmal atrial fibrillation ([facility] [address]     Ventricular tachycardia ([facility] [address]         SURGICAL HISTORY:   Past Surgical History:   Procedure Laterality Date    ABDOMEN SURGERY  2006    perforated bowel dr Queenie Rangel DYSRHYTHMIC FOCUS  02/2017    dr Carmela Caro  08/30/2017    Dr. Jg Tatum- Bypass grafts patent   [address] Left 2010     dr Edin Mejia    CHOLECYSTECTOMY  2007    COLONOSCOPY      CORONARY ARTERY BYPASS GRAFT  2005    cabg dr Tatum Talavera  x 2    CYSTOSCOPY  03/07/2016    with clot evacuation    CYSTOSCOPY Left 11/02/2016    Stent Exchange    CYSTOSCOPY Left 03/29/2017    cysto L stent change digital rectal exam     CYSTOSCOPY  07/10/2017    retrogrades,peylogram, left stent exchange    CYSTOSCOPY Left 12/11/2017    retrotgrade  left stent    DILATATION, ESOPHAGUS  2006    ECHO COMPL W DOP COLOR FLOW  7/14/2013         EYE SURGERY      gian lense implants    [place] CYSTOURETHROSCOPY,URETER CATHETER Left 6/8/2018    CYSTOSCOPY RETROGRADE PYELOGRAM LEFT STENT CHANGE +++IV DYE ALLERGY+++ performed by Marlee PETTIT Memo, DO at [place] [address] [facility]  1/20/16    URETER STENT PLACEMENT Left 07/08/2016        SOCIAL AND OCCUPATIONAL HEALTH HISTORY:    Social History     Social History    Marital status:      Spouse name: N/A    Number of children: N/A    Years of education: N/A     Occupational History    Not on file.      Social History Main Topics    Smoking status: Former Smoker     Packs/day: 2.50     Years: 15.00     Types: Cigarettes     Quit date: 7/8/1976    Smokeless tobacco: Never Used      Comment: stop 1976    Alcohol use No    Drug use: No    Sexual activity: Not on file     Other Topics Concern    Not on file     Social History Narrative    No narrative on file       MEDICATIONS:   Current Outpatient Prescriptions   Medication Sig Dispense Refill    insulin glargine (LANTUS) 100

## 2018-11-05 NOTE — TELEPHONE ENCOUNTER
Call from Mrs. Maria L Montes De Oca stating that Yefri just got released from Rehab and she can hear an alarm coming from his device. Reviewed carelink:   Real time: AF/ BP @ 75  62.4% AF burden   AF since 10.10.2018  Patient is on coumadin. Per  Maximus patient has been feeling well since coming home from rehab and denies any SOB. He dose feel tired but this is nothing new. Mrs. Maria L Montes De Oca will try to bring him in to the office tomorrow to turn off his alarm.      Sobeida Paul

## 2018-11-09 NOTE — H&P
11/9/2018 8:59 AM  Service: Urology  Group: [facility] (Jay/Burak/Philippe)    Jena Jay  [id number]     Chief Complaint: Left hydronephrosis    History of Present Illness:   The patient is a 69 y.o. male patient who presents with the above  The patient did have an admission  He did have a UTI   He was treated  He has been in rehab  His wife is present  He does have remain to have incontinence    Past Medical History:   Diagnosis Date    Arthritis     CAD (coronary artery disease)     follows with Dr. Ana Paula Haas Cardiac defibrillator in place     CHF (congestive heart failure) ([address]     Dementia     wife DPOA    Diabetes mellitus ([address]     Dizziness     uses walker    Dribbling of urine     Hematuria     history of    HFrEF (heart failure with reduced ejection fraction) ([address] 05/12/2017 9/16/16- echo LVEF 25-30%, stage III DD, mild-moderate MR, mild pulmonary hypertension, AV mildly sclerotic, LVDD: 7.4 cm    History of blood transfusion 2015    here    [place] (hard of hearing)     Hypertension     Invasive carcinoma of urinary bladder ([address] 2/13/2015    HAD CHEMO / RADIATION TX    Ischemic cardiomyopathy     CHRISTINE treated with BiPAP     Paroxysmal atrial fibrillation ([address]     Ventricular tachycardia ([address]        Past Surgical History:   Procedure Laterality Date    ABDOMEN SURGERY  2006    perforated bowel dr Codie Mccray DYSRHYTHMIC FOCUS  02/2017    dr Cade Doing  08/30/2017    Dr. Armando Baez- Bypass grafts patent   [address] Left 2010     dr Llamas Gu    CHOLECYSTECTOMY  2007    COLONOSCOPY      CORONARY ARTERY BYPASS GRAFT  2005    cabg dr Marissa Stone  x 2    CYSTOSCOPY  03/07/2016    with clot evacuation    CYSTOSCOPY Left 11/02/2016    Stent Exchange    CYSTOSCOPY Left 03/29/2017    cysto L stent change digital rectal exam     CYSTOSCOPY  07/10/2017    retrogrades,peylogram, left stent exchange    2-12 intact, no focal deficits  Rectal: deferred  Genitalia:  Gray no    Labs:   Recent Labs      11/09/18   0850   WBC  13.8*   RBC  3.97   HGB  10.3*   HCT  35.5*   MCV  89.4   MCH  25.9*   MCHC  29.0*   RDW  17.7*   PLT  359   MPV  12.1*       No results for input(s): CREATININE in the last 72 hours.     Images:      Assessment: Jose Valentino 69 y.o. male     Bladder cancer  Left hydronephrosis    Plan:    All options were discussed  Progress to the OR for left stent exchange  RBA of the surgery was discussed  His wife is present  Pre-op antibiotics      DO [facility]  Urology

## 2018-11-09 NOTE — PROGRESS NOTES
8022 Discharge instructions given to pt;s wife. Verbalizes understanding.  Copy provided
products on the day of surgery    [x] If not already done, you can expect a call from registration    [x] You can expect a call the business day prior to procedure to notify you if your arrival time changes    [x] If you receive a survey after surgery we would greatly appreciate your comments    [] Parent/guardian of a minor must accompany their child and remain on the premises  the entire time they are under our care     [] Pediatric patients may bring favorite toy, blanket or comfort item with them    [] A caregiver or family member must remain with the patient during their stay if they are mentally handicapped, have dementia, disoriented or unable to use a call light or would be a safety concern if left unattended    [x] Please notify surgeon if you develop any illness between now and time of surgery (cold, cough, sore throat, fever, nausea, vomiting) or any signs of infections  including skin, wounds, and dental.    [x]  The Outpatient Pharmacy is available to fill your prescription here on your day of surgery, ask your preop nurse for details    [x] Other instructions: Wear comfortable clothing       EDUCATIONAL MATERIALS PROVIDED:    [] PAT Preoperative Education Packet/Booklet     [] Medication List    [] Fluoroscopy Information Pamphlet    [] Transfusion bracelet applied with instructions    [] Joint replacement video reviewed    [] Shower with soap, lather and rinse well, and use CHG wipes provided the evening before surgery as instructed

## 2018-11-09 NOTE — OP NOTE
[address] [address]                                OPERATIVE REPORT    PATIENT NAME: Caleb Skaggs                  :        1940  MED REC NO:   [id number]                            ROOM:  ACCOUNT NO:   [de-identified]                           ADMIT DATE: 2018  PROVIDER:     Christine Thompson DO    DATE OF PROCEDURE:  2018    PREOPERATIVE DIAGNOSES:  1. Left hydronephrosis. 2.  Bladder cancer. 3.  History of BPH, status post TURP. 4.  Urinary tract infection. POSTOPERATIVE DIAGNOSES:  1. Left hydronephrosis. 2.  Bladder cancer. 3.  History of BPH, status post TURP. 4.  Urinary tract infection. 5.  Please note, it looks like there has been a recurrence of a bladder  cancer. ANESTHESIA:  Monitored anesthesia. SURGEON:  Jono Thompson DO.    ESTIMATED BLOOD LOSS:  None. CONDITION TO PACU:  Stable. Preoperative antibiotics were administered. PATHOLOGIC SPECIMEN:  Specimen that was obtained from the bladder. PROCEDURES PERFORMED:  The patient had:  1. Cystoscopy. 2.  Left stent exchange. 3.  I did obtain bladder specimen for analysis. STORY:  A 81-year-old male known to me who has a history of bladder  cancer that was diagnosed in 2016. Please note the patient at that  time was really deemed not to be a good surgical candidate and underwent  chemotherapy. He has been managed because of left hydronephrosis with a  stent. He did have some BPH with associated bladder outlet obstruction  and underwent a TURP. The patient has had multiple admissions with UTIs  over the course of the last two years. He has had the stent in and it  has been changed periodically. The risks, the benefits, and the  alternatives of the proposed surgical procedure were extensively  explained to him as well as his wife who is present today.   They  understood the risks, the benefits, and the

## 2018-11-09 NOTE — ANESTHESIA PRE PROCEDURE
Bladder spasms N32.89       Past Medical History:        Diagnosis Date    Arthritis     CAD (coronary artery disease)     follows with Dr. Annelise Altamirano Cardiac defibrillator in place     [facility]estive [facility]) [facility] [address]     Dementia     wife DPOA    Diabetes mellitus ([address]     Dizziness     uses walker    Dribbling of urine     Hematuria     history of    HFrEF (heart failure with reduced ejection fraction) ([address] 05/12/2017 9/16/16- echo LVEF 25-30%, stage III DD, mild-moderate MR, mild pulmonary hypertension, AV mildly sclerotic, LVDD: 7.4 cm    History of blood transfusion 2015    here    [place] (hard of hearing)     Hypertension     Invasive carcinoma of urinary bladder ([facility] [address] 2/13/2015    HAD CHEMO / RADIATION TX    Ischemic cardiomyopathy     CHRISTINE treated with BiPAP     Paroxysmal atrial fibrillation ([address]     Ventricular tachycardia ([address]        Past Surgical History:        Procedure Laterality Date    ABDOMEN SURGERY  2006    perforated bowel dr Gurinder Bruno DYSRHYTHMIC FOCUS  02/2017    dr Diamond Resides  08/30/2017    Dr. Creighton Callow- Bypass grafts patent   [address] Left 2010     dr Jonathan Sutton    CHOLECYSTECTOMY  2007    COLONOSCOPY      CORONARY ARTERY BYPASS GRAFT  2005    cabg dr Tiarra Sanchez  x 2    CYSTOSCOPY  03/07/2016    with clot evacuation    CYSTOSCOPY Left 11/02/2016    Stent Exchange    CYSTOSCOPY Left 03/29/2017    cysto L stent change digital rectal exam     CYSTOSCOPY  07/10/2017    retrogrades,peylogram, left stent exchange    CYSTOSCOPY Left 12/11/2017    retrotgrade  left stent    DILATATION, ESOPHAGUS  2006    ECHO COMPL W DOP COLOR FLOW  7/14/2013         EYE SURGERY      gian lense implants    [place] CYSTOURETHROSCOPY,URETER CATHETER Left 6/8/2018    CYSTOSCOPY RETROGRADE PYELOGRAM LEFT STENT CHANGE +++IV DYE ALLERGY+++ performed by Lucas Vivar Memo, DO at [place] [address] [facility]

## 2018-11-10 PROBLEM — N39.0 UTI (URINARY TRACT INFECTION): Status: ACTIVE | Noted: 2018-01-01

## 2018-11-11 NOTE — ED PROVIDER NOTES
History:  reports that he quit smoking about 42 years ago. His smoking use included Cigarettes. He has a 37.50 pack-year smoking history. He has never used smokeless tobacco. He reports that he does not drink alcohol or use drugs. Family History: family history includes Alzheimer's Disease in his mother; Diabetes in his father; Heart Disease in his father. The patients home medications have been reviewed. Allergies: Multaq [dronedarone]; Amiodarone; Bactrim [sulfamethoxazole-trimethoprim]; Iv dye [iodides]; Pollen extract;  Tikosyn [dofetilide]; and Doxycycline    -------------------------------------------------- RESULTS -------------------------------------------------  All laboratory and radiology results have been personally reviewed by myself   LABS:  Results for orders placed or performed during the hospital encounter of 11/10/18   Urinalysis   Result Value Ref Range    Color, UA Yellow Straw/Yellow    Clarity, UA CLOUDY (A) Clear    Glucose, Ur Negative Negative mg/dL    Bilirubin Urine Negative Negative    Ketones, Urine Negative Negative mg/dL    Specific Gravity, UA 1.020 1.005 - 1.030    Blood, Urine LARGE (A) Negative    pH, UA 5.5 5.0 - 9.0    Protein,  (A) Negative mg/dL    Urobilinogen, Urine 0.2 <2.0 E.U./dL    Nitrite, Urine Negative Negative    Leukocyte Esterase, Urine LARGE (A) Negative   CBC Auto Differential   Result Value Ref Range    WBC 14.6 (H) 4.5 - 11.5 E9/L    RBC 3.41 (L) 3.80 - 5.80 E12/L    Hemoglobin 9.0 (L) 12.5 - 16.5 g/dL    Hematocrit 30.7 (L) 37.0 - 54.0 %    MCV 90.0 80.0 - 99.9 fL    MCH 26.4 26.0 - 35.0 pg    MCHC 29.3 (L) 32.0 - 34.5 %    RDW 17.9 (H) 11.5 - 15.0 fL    Platelets 175 164 - 833 E9/L    MPV 11.9 7.0 - 12.0 fL    Neutrophils % 76.1 43.0 - 80.0 %    Immature Granulocytes % 0.8 0.0 - 5.0 %    Lymphocytes % 8.2 (L) 20.0 - 42.0 %    Monocytes % 6.6 2.0 - 12.0 %    Eosinophils % 8.0 (H) 0.0 - 6.0 %    Basophils % 0.3 0.0 - 2.0 %    Neutrophils # 11.08

## 2018-11-11 NOTE — PROGRESS NOTES
Pt refusing to stand to get from the chair into the bed. We are unable to check his bottom at this time to measure his wounds.  Will try again later

## 2018-11-11 NOTE — CONSULTS
performed by Radha Thompson DO at [address] [facility]  11/9/2018: CA CYSTOURETHROSCOPY,URETER CATHETER Left      Comment: CYSTOSCOPY LEFT STENT CHANGE ++IODINE                ALLERGY++ performed by Radha Thompson DO at [address] [facility]  No date: TONSILLECTOMY  1/20/16: TURP  07/08/2016: URETER STENT PLACEMENT Left    Medications Prior to Admission:    Prescriptions Prior to Admission: metolazone (ZAROXOLYN) 2.5 MG tablet, Take 2.5 mg by mouth daily as needed (swelling) Take for daily for 3 days for swellingferrous sulfate 325 (65 Fe) MG tablet, Take 325 mg by mouth daily (with breakfast)ciprofloxacin (CIPRO) 500 MG tablet, Take 0.5 tablets by mouth 2 times daily for 5 daysaspirin 81 MG tablet, Take 81 mg by mouth daily Last dose 11/4/18warfarin (COUMADIN) 4 MG tablet, Take 3 mg by mouth Five times weekly Monday and Friday receives 4.5 mginsulin glargine (LANTUS) 100 UNIT/ML injection vial, Inject 25 Units into the skin daily (Patient taking differently: Inject 35 Units into the skin every morning 45 at HS)insulin lispro (HUMALOG) 100 UNIT/ML injection vial, Inject 0-18 Units into the skin 3 times daily (with meals) (Patient taking differently: Inject 0-18 Units into the skin 3 times daily (with meals) Counts carbs: adds carbs and divides by 2.5)bumetanide (BUMEX) 2 MG tablet, Take 1 tablet by mouth daily (Patient taking differently: Take 2 mg by mouth 2 times daily )mineral oil-hydrophilic petrolatum (AQUAPHOR) ointment, Apply topically as needed. mirtazapine (REMERON) 30 MG tablet, Take 30 mg by mouth nightlymexiletine (MEXITIL) 250 MG capsule, TAKE 1 CAPSULE THREE TIMES DAILY (Patient taking differently: TAKE 1 CAPSULE BY MOUTH THREE TIMES DAILY)vitamin D (CHOLECALCIFEROL) 1000 UNIT TABS tablet, Take 1,000 Units by mouth Daily with lunch metoprolol succinate (TOPROL XL) 200 MG extended release tablet, Take 1 tablet by mouth 2 times daily (Patient taking differently: Take 200 mg by mouth 2 times daily Instructed 20   Ht 5' 9\" (1.753 m)   Wt 200 lb (90.7 kg)   SpO2 97%   BMI 29.53 kg/m²     General:  Awake, alert, oriented X 3. Well developed, well nourished, well groomed. No apparent distress. HEENT:  Normocephalic, atraumatic. Pupils equal, round. No scleral icterus. No conjunctival injection. Normal lips, teeth, and gums. No nasal discharge. Neck:  Supple, no masses. Heart:  RRR. Lungs:  No audible wheezing. Respirations symmetric and non-labored. Clear bilaterally. Abdomen:  Soft, nontender, no masses, no organomegaly, no peritoneal signs. Active bowel sounds. No rebound or guarding. No flank or CVA tenderness. Obese. He has a well-healed midline abdominal incision. Extremities:  No clubbing, cyanosis, or edema. Brisk peripheral pulses. Skin:  Warm and dry, no open lesions or rashes. Neuro:  Cranial nerves 2-12 intact, no focal motor or sensory deficits. Alert and oriented. Rectal: Deferred  Genitalia:  Normal male genitalia without lesions or deformity. His meatus is patent and at the tip of his glans. His testicles are both normal in size and consistency and are nontender and without masses. He has no evidence of a hydrocele, hernia, varicocele bilaterally. Spermatic cord and vas deferens are normal bilaterally. He is an 18-Spanish Gray catheter well position draining clear, yellow urine into a gravity bag.     LABS:    Lab Results       Component                Value               Date                       WBC                      14.6 (H)            11/10/2018                 HGB                      9.0 (L)             11/10/2018                 HCT                      30.7 (L)            11/10/2018                 MCV                      90.0                11/10/2018                 PLT                      268                 11/10/2018              Lab Results       Component                Value               Date                       BUN                      25 (H) 11/10/2018                 CREATININE               1.6 (H)             11/10/2018              Lab Results       Component                Value               Date                       PSA                      0.19                07/17/2018                 PSA                      0.93                02/11/2015                ASSESSMENT / PLAN:    Recurrent bladder cancer/Chronic left hydronephrosis/Gross hematuria/BPH (S/P TURP-1/20/16)/Incontinence  He did well postoperatively but was readmitted for hypoglycemia. He is feeling better and hopes to go back home soon. He is due for a voiding trial tomorrow if his catheter will be removed in the morning. He is likely to resume wearing a diaper. Latter scan residuals will be monitored to make sure he is emptying his bladder thoroughly. His ureteral stent on the left was recently changed and is being changed every 4-5 months roughly. The pathology report from his repeat bladder biopsy 2 days ago is pending. There is evidence however of recurrent bladder cancer. I believe he was treated with radiation and chemotherapy in the past. The results of the biopsy will need to be discussed further with [facility] once they are resulted. He will follow up with Dr. Joleen Thompson in 1-2 weeks for this. For the meantime, his catheter will be irrigated as needed. This will be secured with a Velcro leg strap to prevent any trauma. He does have a history of gross hematuria and clot retention last month. He is taking Coumadin and does not seem to be having any issues with this currently. We will follow him closely during his hospital stay. Thank you for allowing me to assist in his care once again.   Sincerely,      Lonnie Buys  3:11 PM  11/11/2018

## 2018-11-11 NOTE — ED NOTES
Patient has skin alterations including:  Location - right buttocks  Description - red, open area measures approx 2 cm x 0.5 cm     Patient has skin alterations including:  Location - right lower extremity  Description - red, open area measures approx 6.5 cm x 5.5 cm (per wife was a blister that pt rubbed open)    Buttocks and heels are reddened.       Beto Mckeon RN  11/10/18 [id number]

## 2018-11-12 NOTE — FLOWSHEET NOTE
Inpatient Wound Care    Admit Date: 11/10/2018  5:54 PM    Reason for consult:  Legs , buttocks    Significant history:      Wound history:  POA    Findings:       11/12/18 1108   Wound 11/10/18 Leg Right; Lower;Medial   Date First Assessed/Time First Assessed: 11/10/18 2235   Location: Leg  Wound Location Orientation: Right; Lower;Medial  Pre-existing: Yes   Wound Type Wound   Dressing Status Changed   Dressing Changed Changed/New   Dressing/Treatment Non adherent; Wound gel;Alginate   Wound Cleansed Rinsed/Irrigated with saline   Dressing Change Due 11/14/18   Wound Length (cm) 4.5 cm   Wound Width (cm) 6 cm   Wound Depth (cm)  0.2   Calculated Wound Size (cm^2) (l*w) 27 cm^2   Change in Wound Size % (l*w) -22.73   Wound Assessment Red   Drainage Amount Small   Drainage Description Serous   Odor None   Yari-wound Assessment Yellow-brown;Edema       Impression: Open blister R. Lower leg. Surrounding scratches, edema. Scratch marks noted L. Lower extremity. MAD in gluteal crease with epidermal tissue loss. Interventions in place:  R. Lower leg cleansed with foam cleanser. Wound cleansed with NSS. Applied adaptic then wound gel and opticell. Covered with ABD then secured with kerlix. Plan: QOD dressing changes RLE. Adaptic then tubigrips to intact skin on gian lower extremities. Aquaphor to buttocks. SOS precautions. Wife at the bedside and updated on the plan of care.        Marixa Grimm 11/12/2018 11:10 AM

## 2018-11-12 NOTE — PROGRESS NOTES
11/12/2018 7:53 AM  Service: Urology  Group: [facility] ([facility])    Anne Marie Reddy  [id number]    Subjective:  Afebrile  Awake and alert  No new complaints  His catheter is out  He has been incontinent already        Review of Systems  Respiratory: He denies shortness of breath or cough  Cardiovascular: negative for chest pain  Gastrointestinal: negative for nausea and vomiting , he is asking for breakfast  Hematologic/lymphatic: negative for hematuria  Musculoskeletal:negative  Neurological: negative for headaches  Endocrine: negative    Scheduled Meds:   warfarin (COUMADIN) daily dosing (placeholder)   Other RX Placeholder    allopurinol  300 mg Oral Lunch    aspirin  81 mg Oral Daily    bumetanide  2 mg Oral BID    ferrous sulfate  325 mg Oral Daily with breakfast    hydrALAZINE  25 mg Oral 3 times per day    insulin glargine  25 Units Subcutaneous Daily    isosorbide mononitrate  60 mg Oral BID    metoprolol succinate  200 mg Oral BID    mexiletine  250 mg Oral 3 times per day    mirtazapine  30 mg Oral Nightly    potassium chloride  20 mEq Oral BID    insulin lispro  0-12 Units Subcutaneous TID WC    insulin lispro  0-6 Units Subcutaneous Nightly    cefTRIAXone (ROCEPHIN) IV  1 g Intravenous Q24H    sodium chloride flush  10 mL Intravenous 2 times per day       Objective:  Vitals:    11/11/18 2345   BP:    Pulse:    Resp:    Temp:    SpO2: 99%         Allergies: Multaq [dronedarone]; Amiodarone; Bactrim [sulfamethoxazole-trimethoprim]; Iv dye [iodides]; Pollen extract;  Tikosyn [dofetilide]; and Doxycycline    General Appearance: alert and conversing in no acute distress  Skin: warm and dry  Pulmonary/Chest:  normal air movement, no respiratory distress, he is wearing 02  Abdomen: soft, non-tender, non-distended  Labs:     Recent Labs      11/12/18   0610   NA  141   K  3.8   CL  103   CO2  28   BUN  24*   CREATININE  1.4*   GLUCOSE  172*   CALCIUM  8.6       Lab Results

## 2018-11-12 NOTE — PROGRESS NOTES
Subjective:  ***    Objective:    /61   Pulse 75   Temp 97.7 °F (36.5 °C) (Oral)   Resp 18   Ht 5' 9\" (1.753 m)   Wt 200 lb (90.7 kg)   SpO2 97%   BMI 29.53 kg/m²     24HR INTAKE/OUTPUT:    Intake/Output Summary (Last 24 hours) at 11/12/18 1008  Last data filed at 11/12/18 0648   Gross per 24 hour   Intake              480 ml   Output             2550 ml   Net            -2070 ml     ***  Heart:  RRR, no murmurs, gallops, or rubs. Lungs:  CTA bilaterally, no wheeze, rales or rhonchi  Abd: bowel sounds present, nontender, nondistended, no masses  Extrem:  No clubbing, cyanosis, or edema    Most Recent Labs  Lab Results   Component Value Date    WBC 15.1 (H) 11/12/2018    HGB 9.3 (L) 11/12/2018    HCT 30.9 (L) 11/12/2018     11/12/2018     11/12/2018    K 3.8 11/12/2018     11/12/2018    CREATININE 1.4 (H) 11/12/2018    BUN 24 (H) 11/12/2018    CO2 28 11/12/2018    GLUCOSE 172 (H) 11/12/2018    ALT 12 10/04/2018    AST 13 10/04/2018    INR 1.3 11/12/2018    TSH 3.550 11/13/2017    LABA1C 7.2 (H) 10/05/2018    LABMICR 410.8 (H) 08/29/2017       XR CHEST PORTABLE   Final Result      Lines, tubes, and devices:  Right-sided Mediport tip is unchanged. Left-sided biventricular AICD unchanged in position. Patient status   post median sternotomy. Lungs and pleura:  Pulmonary vascular congestion, interstitial edema   and early alveolar edema. There are bilateral layering pleural   effusions with adjacent opacities, most commonly secondary to   atelectasis,   Infection is not excluded. There is a calcified granuloma in the right   upper lobe. Mediastinal surgical clips are noted. Cardiomediastinal silhouette:  Unchanged enlargement of the   cardiomediastinal silhouette.       Other:  N/A                   Assessment    Principal Problem:    Hypoglycemia  Active Problems:    Coronary artery disease involving native coronary artery of native heart without angina pectoris

## 2018-11-14 PROBLEM — N17.9 AKI (ACUTE KIDNEY INJURY) (HCC): Status: ACTIVE | Noted: 2018-01-01

## 2018-11-14 NOTE — H&P
Department of Internal Medicine  General Internal Medicine  Geoff Celestin M.D. History and Physical      CHIEF COMPLAINT:  hematuria    Reason for Admission:  As above     History Obtained From:  patient     HISTORY OF PRESENT ILLNESS:      The patient is a 69 y.o. male of Nohemi Naranjo MD with significant past medical history of ischemcic cardiomyopathy , paf on chronic oac , bladder ca who presents with hematuria. He was recently inpatient from 11/10 for hypoglycemia after recent stent I ureteral left stent exchange and bladder mass biopsy performed on 11/09 . he was discharged on 11/12 and returns back to hospital on 11/14 for severe hematuria - described as large clots. No light headedness or syncope. He appears somewhat weak and chronically ill but otherwise is without complaints. Vitals on admission 98, 18, 85, 129/62. No imaging done. Urology consulted and is recievign irrigation . hgb 8.9 on admission.      Past Medical History:        Diagnosis Date    Arthritis     CAD (coronary artery disease)     follows with Dr. Rees Proper Cardiac defibrillator in place     CHF (congestive heart failure) ([address]     Dementia     wife DPOA    Diabetes mellitus ([address]     Dizziness     uses walker    Dribbling of urine     Hematuria     history of    HFrEF (heart failure with reduced ejection fraction) ([address] 05/12/2017 9/16/16- echo LVEF 25-30%, stage III DD, mild-moderate MR, mild pulmonary hypertension, AV mildly sclerotic, LVDD: 7.4 cm    History of blood transfusion 2015    here    [place] (hard of hearing)     Hypertension     Invasive carcinoma of urinary bladder ([address] 2/13/2015    HAD CHEMO / RADIATION TX    Ischemic cardiomyopathy     CHRISTINE treated with BiPAP     Paroxysmal atrial fibrillation ([address]     Ventricular tachycardia [facility])      Past Surgical History:        Procedure Laterality Date    ABDOMEN SURGERY  2006    perforated bowel dr Robert Carranza DYSRHYTHMIC FOCUS  02/2017    dr Kailyn Espinosa

## 2018-11-14 NOTE — PROGRESS NOTES
to Sit: Max A  Sit to supine: Max A x2    Functional Transfers STS: 3 attempts to complete sit to stand. One successful stand at Max A to [address] limited due to pain at catheter insertion site with posterior lean limiting participation and max cues to correct posture in preparation for stand    Functional Mobility NT      Sit balance: fair  Stand balance: poor   Endurance/Activity tolerance: poor limited at this session due to pain at catheter site  Sensation: no complaints of numbness/tingling   Edema: none                             Eval Complexity: low   Profile and History- brief   Assessment of Occupational Performance and Identification of Deficits- [place], decreased cognitive status, decreased functional performance in self care tasks, decreased activity tolerance, pain at catheter site  Clinical Decision Making- Min       Assessment of current deficits    Functional mobility [x]  ROM [] Strength [x]  Cognition []  ADLs [x]   IADLs [] Safety Awareness [x] Endurance [x]  Fine Motor Coordination [] Balance [x] Vision/perception [] Sensation []   Gross Motor Coordination []       Treatment frequency: 2-5x/wk    Plan of Care:   ADL retraining [x]   Equipment needs [x]   Neuromuscular re-education [] Energy Conservation Techniques [x]  Functional Transfer training [x] Patient and/or Family Education [x]  Functional Mobility training [x]  Environmental Modifications []  Cognitive re-training []   Compensatory techniques for ADLs [x]  Splinting Needs []                             Therapeutic Activities [x]  Positioning/joint protection []             Therapeutic Strengthening  [x]      Other: []      Rehab Potential: Good for stated goals    Patient / Family Goal: Pt does not state a goal this session    Short term goals  Time Frame: 1-2 weeks    1. Increase LE Dressing/Bathing and Toileting to Min A  2. Increase Functional Transfers to Min A  3. Increase Functional Mobility to Min A during all self care tasks  4. Increase standing tolerance to 3-4 min with fair minus balance during self care tasks  5. Increase UE Dressing/Bathing and Grooming to SBA      Patient and/or family understands diagnosis, prognosis and plan of care: Patient educated on OT POC/Established Goals. Patient verbalized fair understanding. Treatment Provided:  Patient educated on adapted techniques for completion of ADL, safe functional transfers and mobility. Patient required cues for follow through with proper hand/foot placement, pacing, safety, attention, sequencing and direction following in bed mobility, sitting EOB, attempts to stand, STS and LB dressing in preparation for maximum independence in self care tasks. OT faciltiates increased core stability seated EOB x6-7 min in preparation for increased independence in self care tasks. Max cues throughout for weight shifting and active participation in 3 attempts to stand from EOB. Comments: Upon arrival pt supine in bed. At end of session pt supine in bed with all devices within reach, all lines and tubes intact. Bed/chair alarm: ON.        Time In: [address] [facility]  Time Out: [address] OTR/L [id number]

## 2018-11-14 NOTE — FLOWSHEET NOTE
Inpatient Wound Care    Admit Date: 11/14/2018  7:20 AM    Reason for consult:  R. Leg, buttocks    Significant history:  Admitted for hematuria. History includes: Bladder tumor with stent, DM, CKD and CAD. Wound history:  POA    Findings:       11/14/18 1542   Wound 11/10/18 Leg Right; Lower;Medial   Date First Assessed/Time First Assessed: 11/10/18 2235   Location: Leg  Wound Location Orientation: Right; Lower;Medial  Pre-existing: Yes   Wound Type Wound   Dressing Status Changed   Dressing Changed Changed/New   Dressing/Treatment Non adherent   Wound Cleansed Rinsed/Irrigated with saline   Dressing Change Due 11/16/18   Wound Length (cm) 4.5 cm   Wound Width (cm) 6 cm   Wound Depth (cm)  0.1   Calculated Wound Size (cm^2) (l*w) 27 cm^2   Change in Wound Size % (l*w) -22.73   Wound Assessment Red   Drainage Amount None   Odor None   Yari-wound Assessment (dry blisters)       Impression:  Healing R. Lower leg open blister. Both lower extremity edema with scabbed areas. Scar to buttocks. Interventions in place:  R. Lower leg cleansed with foam cleanser. (Wound cleansed with NSS). Applied Xeroform gauze then gauze and kerlix. Plan: QOD dressing change to R. Leg. Aquaphor to lower extremities and buttocks, SOS precautions.        Tima Paz 11/14/2018 3:45 PM

## 2018-11-15 NOTE — PLAN OF CARE
Problem: Falls - Risk of:  Goal: Will remain free from falls  Will remain free from falls    Outcome: Met This Shift    Goal: Absence of physical injury  Absence of physical injury    Outcome: Met This Shift      Problem: Skin Integrity:  Goal: Will show no infection signs and symptoms  Will show no infection signs and symptoms   Outcome: Met This Shift    Goal: Absence of new skin breakdown  Absence of new skin breakdown   Outcome: Met This Shift      Problem: Health Behavior:  Goal: Compliance with treatment plan for underlying cause of condition will improve  Compliance with treatment plan for underlying cause of condition will improve   Outcome: Met This Shift    Goal: Identification of resources available to assist in meeting health care needs will improve  Identification of resources available to assist in meeting health care needs will improve   Outcome: Met This Shift      Problem: Fluid Volume:  Goal: Maintenance of adequate hydration will improve  Maintenance of adequate hydration will improve   Outcome: Met This Shift      Problem: Urinary Elimination:  Goal: Skin integrity will improve  Skin integrity will improve   Outcome: Met This Shift    Goal: Ability to recognize the need to void and respond appropriately will improve  Ability to recognize the need to void and respond appropriately will improve   Outcome: Met This Shift    Goal: Will remain free from infection  Will remain free from infection   Outcome: Met This Shift    Goal: Incidence of incontinence will decrease  Incidence of incontinence will decrease   Outcome: Met This Shift      Problem: Risk for Impaired Skin Integrity  Goal: Tissue integrity - skin and mucous membranes  Structural intactness and normal physiological function of skin and  mucous membranes.    Outcome: Met This Shift

## 2018-11-15 NOTE — PROGRESS NOTES
Short Term/ Long Term   Goals   Was pt agreeable to Eval/treatment? yes     Does pt have pain? C/o pain in ankles when touched     Bed Mobility  Rolling: NT  Supine to sit: max assist of 2  Sit to supine: max assist of 2  Scooting: max assist of 2  Mod assist   Transfers Sit to stand: mod assist of 2  Stand to sit: mod assist of 2  Stand pivot: mod assist of 2 with ww  Min assist   Ambulation    NT  15 feet with ww with min assist   Stair Negotiation  Ascended and descended  NT      AM-PAC Raw score               8/24       Pt is alert, confusion noted  LE ROM: WFL  LE strength: grossly 3/5  Balance: poor  Sensation: NT  Endurance: fair-     ASSESSMENT  Pt displays functional ability as noted in the objective portion of this evaluation. Comments/Treatment:  Upon standing pt yelled \"I'm going to fall\". Poor standing balance       Patient education  Pt educated on transfer technique    Patient response to education:   Pt verbalized understanding Pt demonstrated skill Pt requires further education in this area   no With assist yes     Rehab potential is fair for reaching above PT goals. Pts/ family goals   1. To walk to bathroom    Patient and or family understand(s) diagnosis, prognosis, and plan of care. PLAN  PT care will be provided in accordance with the objectives noted above. Whenever appropriate, clear delegation orders will be provided for nursing staff. Exercises and functional mobility practice will be used as well as appropriate assistive devices or modalities to obtain goals. Patient and family education will also be administered as needed. Frequency of treatments will be 2-5x/week x 5 days.     Marie Garner PT  [id number]

## 2018-11-15 NOTE — CARE COORDINATION
Social Work / Discharge Planning : SW noted consult for discharge planning. SW met with patient and explained role and then spoke to spouse Jon Cat who is patients main caregiver at home. Patient plan at discharge is back home with spouse. Spouse can transport but will need assist with patient in car. She states she has a friend that will assist with getting patient in the home. Patient does have therapy ordered and did have a recent stay at [facility]. However, declining SNF. Spouse informed SW that patient has [facility] [phone]  [phone] at home. [facility] order will be needed at discharge. .Patient has a ww, wc, step in shower with grab bars and 3 l home o2 through [employer]. Portable tanks of 02 for car and home and a lift chair. Spouse also has transport services through [facility] to assist patient to and from appointments. Patient was going to [facility] but now is mainly homebound. Patient PCP is DR Mony Warren. Plan home with [facility]. SW to follow.  Electronically signed by SAEID Hutton on 11/15/2018 at 9:43 AM

## 2018-11-15 NOTE — PROGRESS NOTES
11/15/2018 12:52 PM  Service: Urology  Group: [facility] ([facility])    Esteban Curtis  [id number]    Subjective:  Afebrile  Complains of fatigue  Urine very light red with CBI running   He denies pain          Review of Systems  Denies pain  Sleeping most of the time  No respiratory distress  Still has some mild hematuria with no gross clots      Scheduled Meds:   allopurinol  300 mg Oral Lunch    ferrous sulfate  325 mg Oral Daily with breakfast    hydrALAZINE  25 mg Oral 3 times per day    isosorbide mononitrate  60 mg Oral BID    metoprolol succinate  200 mg Oral BID    mexiletine  250 mg Oral 2 times per day    mirtazapine  30 mg Oral Nightly    sodium chloride flush  10 mL Intravenous 2 times per day    insulin lispro  0-6 Units Subcutaneous TID WC    insulin lispro  0-3 Units Subcutaneous Nightly    mineral oil-hydrophilic petrolatum   Topical BID       Objective:  Vitals:    11/15/18 0800   BP: 132/60   Pulse: 78   Resp: 18   Temp: 97.9 °F (36.6 °C)   SpO2: 94%         Allergies: Multaq [dronedarone]; Amiodarone; Bactrim [sulfamethoxazole-trimethoprim]; Iv dye [iodides]; Pollen extract; Tikosyn [dofetilide]; and Doxycycline    General Appearance: Arouses to verbal stimuli and converses. Weakness. Skin: warm and dry  Pulmonary/Chest:  normal air movement, no respiratory distress   Abdomen: soft, not tender    Chávez, 3 way chávez with CBI running slowly    Labs:     Recent Labs      11/15/18   0420   NA  137   K  4.4   CL  103   CO2  25   BUN  38*   CREATININE  1.9*   GLUCOSE  188*   CALCIUM  8.4*       Lab Results   Component Value Date    HGB 7.9 11/15/2018    HCT 26.4 11/15/2018         Assessment/Plan:  Bladder cancer post chemo, radiation and TURBT  Gross hematuria, improving with CBI, will attempt to wean  Anemia/ BPH post TURP/RI/UTI/Bladder spasms  Cystoscopy, left stent exchange, post op day 6.   Wean CBI  Coumadin is on hold  Recommend cardiology consultation in regards to continued anticoagulation. He will likely have continued admissions for hematuria as coumadin is restarted.        Brittney Limon APRN - CNP   [facility]  Urology

## 2018-11-15 NOTE — CARE COORDINATION
MET  WITH  PT 'S  WIFE,  WHO  IS  CAREGIVER , AT BEDSIDE. INTRODUCED MYSELF  AS AN  RN    AND EXPLAINED  MY  ROLE. WIFE  STATES  PT IS HOMEBOUND, USES [facility]  [address]  AND  PLAN IS  FOR  RETURN  HOME  . DISCUSSED  CURENT COURSE  OF  TREATMENT Halina Valdovinos OF  CARE. SHE  EXPRESSED  UNDERSTANDING, STATES\"WE\"VE BEEN THROUGH THIS  BFORE\". WILL FOLLOW ALONG  WITH  SOCIAL  WORK FOR ANY  DISCHARGE  NEEDS. Ag Fry RN,CM.

## 2018-11-16 NOTE — PATIENT CARE CONFERENCE
[facility] Quality Flow/Interdisciplinary Rounds Progress Note        Quality Flow Rounds held on November 16, 2018    Disciplines Attending:  Bedside Nurse, ,  and Nursing Unit Leadership    Allan Luna was admitted on 11/14/2018  7:20 AM    Anticipated Discharge Date:  Expected Discharge Date: 11/18/18    Disposition:    Fabian Score:  Fabian Scale Score: 17    Readmission Score:         Discussed patient goal for the day, patient clinical progression, and barriers to discharge. The following Goal(s) of the Day/Commitment(s) have been identified:  Monitor labs, intake/output and cont to CBI.        Sun Scott  November 16, 2018

## 2018-11-17 NOTE — PROGRESS NOTES
~8   Creat  improved to 1.4  Increase iss dt hyperglycemia    He will need rehab rather than home   dvt proph  Pt/OT  Dc plan   All consultants notes reviewed    Brenna Ritchie MD  2:11 PM  11/17/2018

## 2018-11-17 NOTE — PROGRESS NOTES
[facility]  PROGRESS NOTE    Chief Complaint: Gross Hematuria   HPI:  Denies pain this morning           Has been up to chair            No complaints with the catheter            Denies bladder spasms       Vitals:    11/17/18 0812   BP: (!) 145/71   Pulse: 74   Resp: 18   Temp: 97.4 °F (36.3 °C)   SpO2: 96%       Allergies: Multaq [dronedarone]; Amiodarone; Bactrim [sulfamethoxazole-trimethoprim]; Iv dye [iodides]; Pollen extract;  Tikosyn [dofetilide]; and Doxycycline    PAST MEDICAL HISTORY:   Past Medical History:   Diagnosis Date    Arthritis     CAD (coronary artery disease)     follows with Dr. Percy Gonsalez Cardiac defibrillator in place     [facility]est[facility]) [facility]     Dementia     wife DPOA    Diabetes mellitus ([facility]     Dizziness     uses walker    Dribbling of urine     Hematuria     history of    HFrEF (heart failure with reduced ejection fraction) ([facility] 05/12/2017 9/16/16- echo LVEF 25-30%, stage III DD, mild-moderate MR, mild pulmonary hypertension, AV mildly sclerotic, LVDD: 7.4 cm    History of blood transfusion 2015    here    [place] (hard of hearing)     Hypertension     Invasive carcinoma of urinary bladder ([facility] 2/13/2015    HAD CHEMO / RADIATION TX    Ischemic cardiomyopathy     CHRISTINE treated with BiPAP     Paroxysmal atrial fibrillation ([address]     Ventricular tachycardia ([address]        PAST SURGICAL HISTORY:   Past Surgical History:   Procedure Laterality Date    ABDOMEN SURGERY  2006    perforated bowel dr Lilia Morris DYSRHYTHMIC FOCUS  02/2017    dr Rick Melendez  08/30/2017    Dr. Wendy Clemons- Bypass grafts patent   Barbara Mejia Left 2010     dr Ramesh Pham    CHOLECYSTECTOMY  2007    COLONOSCOPY      CORONARY ARTERY BYPASS GRAFT  2005    cabg dr Fadi Webb  x 2    CYSTOSCOPY  03/07/2016    with clot evacuation    CYSTOSCOPY Left 11/02/2016    Stent Exchange    CYSTOSCOPY Left 03/29/2017    cysto L

## 2018-11-17 NOTE — PROGRESS NOTES
INPATIENT CARDIOLOGY FOLLOW-UP    Name: Kathleen Reveles    Age: 69 y.o. Date of Admission: 11/14/2018  7:20 AM    Date of Service: 11/17/2018    Chief Complaint: Follow-up for dilated cardiomyopathy,ICD,atrial fibrillation    Interim History:  No new overnight cardiac complaints. Currently with no complaints of CP, SOB, palpitations, dizziness, or lightheadedness. SR on telemetry. Review of Systems:   Cardiac: As per HPI  General: No fever, chills  Pulmonary: As per HPI  HEENT: No visual disturbances, difficult swallowing  GI: No nausea, vomiting  Endocrine: No thyroid disease or DM  Musculoskeletal: KRUEGER x 4, no focal motor deficits  Skin: Intact, no rashes  Neuro/Psych: No headache or seizures    Problem List:  Active Problems:    DANA (acute kidney injury) ([facility] [address]  Resolved Problems:    * No resolved hospital problems. *      Allergies:   Allergies   Allergen Reactions    Multaq [Dronedarone] Shortness Of Breath    Amiodarone Other (See Comments)     Difficulty breathing    Bactrim [Sulfamethoxazole-Trimethoprim] Itching    Iv Dye [Iodides] Hives and Itching    Pollen Extract Other (See Comments)     Seasonal allergies    Tikosyn [Dofetilide] Diarrhea and Nausea And Vomiting    Doxycycline Rash       Current Medications:  Current Facility-Administered Medications   Medication Dose Route Frequency Provider Last Rate Last Dose    LORazepam (ATIVAN) tablet 0.5 mg  0.5 mg Oral Q12H PRN Titi Tee MD   0.5 mg at 11/16/18 0910    HYDROcodone-acetaminophen (NORCO) 5-325 MG per tablet 1 tablet  1 tablet Oral Q6H PRN Titi Tee MD   1 tablet at 11/16/18 1504    albuterol (PROVENTIL) nebulizer solution 2.5 mg  2.5 mg Nebulization Q4H PRN Titi Tee MD   2.5 mg at 11/16/18 2030    haloperidol lactate (HALDOL) injection 2 mg  2 mg Intramuscular Q6H PRN Titi Tee MD   2 mg at 11/16/18 2215    furosemide (LASIX) injection 40 mg  40 mg Intravenous Daily Augustine Apt DO   40 mg at 11/17/18 2092    11/17/18   0500   WBC  12.9*  12.8*  13.2*   RBC  2.97*  2.94*  3.04*   HGB  7.9*  7.7*  7.9*   HCT  26.4*  26.1*  26.9*   MCV  88.9  88.8  88.5   MCH  26.6  26.2  26.0   MCHC  29.9*  29.5*  29.4*   RDW  18.1*  17.8*  17.6*   PLT  219  211  242   MPV  12.5*  11.7  12.4*     Lab Results   Component Value Date    CKTOTAL 22 10/09/2018    CKMB 2.0 01/19/2017    TROPONINI 0.02 10/09/2018    TROPONINI 0.02 10/08/2018    TROPONINI 0.02 10/08/2018     Lab Results   Component Value Date    INR 1.3 11/17/2018    INR 1.2 11/16/2018    INR 1.4 11/14/2018    PROTIME 14.4 (H) 11/17/2018    PROTIME 14.2 (H) 11/16/2018    PROTIME 16.1 (H) 11/14/2018     Lab Results   Component Value Date    TSH 3.550 11/13/2017     Lab Results   Component Value Date    LABA1C 7.2 (H) 10/05/2018     No results found for: EAG  Lab Results   Component Value Date    CHOL 139 11/14/2017    CHOL 77 12/03/2014    CHOL 107 04/06/2014     Lab Results   Component Value Date    TRIG 81 11/14/2017    TRIG 74 12/03/2014    TRIG 38 04/06/2014     Lab Results   Component Value Date    HDL 37 11/14/2017    HDL 23 12/03/2014    HDL 46 04/06/2014     Lab Results   Component Value Date    LDLCALC 86 11/14/2017    LDLCALC 39 12/03/2014    LDLCALC 53 04/06/2014     Lab Results   Component Value Date    LABVLDL 16 11/14/2017    LABVLDL 15 12/03/2014    LABVLDL 8 04/06/2014     No results found for: CHOLHDLRATIO    Cardiac Tests:  ECG:    Telemetry findings reviewed:  V-paced    Chest X-ray:     Echocardiogram: 11/14/17     Summary   Technically limited study - no Definity used   Left ventricle was not well visualized.   Moderately dilated left ventricle.   Septal motion consistent with paced rhythm .   Stage II diastolic dysfunction.   Measured ejection fraction is 25 %.  Mild mitral regurgitation is present. Tele strips:  V paced   Diagnostic:  CXR 11/16  1. Interval increase bilateral perihilar opacities representing  pulmonary edema or infiltrates.   2. Small left-sided pleural effusion. 3. Stable cardiomegaly with post-CABG changes, left-sided  biventricular pacemaker. 4. Right internal jugular Port-A-Cath with tip overlying the superior  vena cava. Pertinent History:  1 68 yo admitted 11/14/18 via ED  [place] for hematuria. Discharged on 11/10-11/12 for hematuria and  urethral stent dx  bladder cancer  2 CABG x 3  4/8/2005 SABAS-LAD, SVG-ramus intermedius, SVG-PLOM Dr Bobo Tatum  3 Post op AF 2005 Rx  coumadin. [id number]= 6  4 Amiodarone stopped ( lung toxicity). Failed Multaq and Tikosyn (diarrhea). Rx Mexiletine  ICD implant 8/24/2005 Dr Juanis Mcneil   5 Upgrade to Bi-V in 5/2014 [employer] Viva  6 ICMP NYHA class III Stage C  7 COPD on chronic O2 3 liters around the clock  8 VT Storm 8/2017  9 Cath 8/30/2017 : Patent grafts and native RCA  10  ICD shock 11/12/18 K+ 3.7, Mag 1.9 On Mexiletine 250 mg TID.  Per >>possibly exacerbated by acute mild CHF, mild decrease in K. SCr 1.7  11 TTE 11/2017: EF 25%      Assessment:  1 Chronic Ischemic heart disease  2 HfrEF  3 s/p BiV ICD  4 Chronic AF  5 Hematuria >>>Coumadin held currently    Plan:  1 Continue telemetry  2 No change from a cardiac perspective  3 resume Coumadin when cleared        Azar Colunga MD  [facility]) Cardiology        Lab Results   Component Value Date     11/17/2018     11/16/2018     11/15/2018    K 4.1 11/17/2018    K 4.4 11/16/2018    K 4.4 11/15/2018     11/17/2018     11/16/2018     11/15/2018    CO2 27 11/17/2018    CO2 22 11/16/2018    CO2 25 11/15/2018    BUN 29 (H) 11/17/2018    BUN 31 (H) 11/16/2018    BUN 38 (H) 11/15/2018    CREATININE 1.4 (H) 11/17/2018    CREATININE 1.5 (H) 11/16/2018    CREATININE 1.9 (H) 11/15/2018    GLUCOSE 309 (H) 11/17/2018    GLUCOSE 275 (H) 11/16/2018    GLUCOSE 188 (H) 11/15/2018    CALCIUM 8.6 11/17/2018    CALCIUM 8.6 11/16/2018    CALCIUM 8.4 (L) 11/15/2018

## 2018-11-18 NOTE — PROGRESS NOTES
Spoke with Dr. Deidre Douglas regarding patient having numerous runs of V -Tach.  Dr. Deidre Douglas will place orders

## 2018-11-18 NOTE — PROGRESS NOTES
INPATIENT CARDIOLOGY FOLLOW-UP    Name: Jennifer Clayton    Age: 69 y.o. Date of Admission: 11/14/2018  7:20 AM    Date of Service: 11/18/2018    Chief Complaint: Follow-up for ICD,cardiomyopathy,CHF    Interim History:  No new overnight cardiac complaints. Currently with no complaints of CP, SOB, palpitations, dizziness, or lightheadedness. SR on telemetry. Review of Systems:   Cardiac: As per HPI  General: No fever, chills  Pulmonary: As per HPI  HEENT: No visual disturbances, difficult swallowing  GI: No nausea, vomiting  Endocrine: No thyroid disease or DM  Musculoskeletal: KRUEGER x 4, no focal motor deficits  Skin: Intact, no rashes  Neuro/Psych: No headache or seizures    Problem List:  Active Problems:    DANA (acute kidney injury) ([facility] [address]  Resolved Problems:    * No resolved hospital problems. *      Allergies:   Allergies   Allergen Reactions    Multaq [Dronedarone] Shortness Of Breath    Amiodarone Other (See Comments)     Difficulty breathing    Bactrim [Sulfamethoxazole-Trimethoprim] Itching    Iv Dye [Iodides] Hives and Itching    Pollen Extract Other (See Comments)     Seasonal allergies    Tikosyn [Dofetilide] Diarrhea and Nausea And Vomiting    Doxycycline Rash       Current Medications:  Current Facility-Administered Medications   Medication Dose Route Frequency Provider Last Rate Last Dose    LORazepam (ATIVAN) tablet 0.5 mg  0.5 mg Oral Q12H PRN Jose Little MD   0.5 mg at 11/16/18 0910    HYDROcodone-acetaminophen (NORCO) 5-325 MG per tablet 1 tablet  1 tablet Oral Q6H PRN Jose Little MD   1 tablet at 11/16/18 1504    albuterol (PROVENTIL) nebulizer solution 2.5 mg  2.5 mg Nebulization Q4H PRN Jose Little MD   2.5 mg at 11/16/18 2030    haloperidol lactate (HALDOL) injection 2 mg  2 mg Intramuscular Q6H PRN Jose Little MD   2 mg at 11/16/18 2215    furosemide (LASIX) injection 40 mg  40 mg Intravenous Daily April Ashley DO   40 mg at 11/18/18 0932    insulin glargine (LANTUS) HCT  26.1*  26.9*  28.1*   MCV  88.8  88.5  88.6   MCH  26.2  26.0  26.5   MCHC  29.5*  29.4*  29.9*   RDW  17.8*  17.6*  17.8*   PLT  211  242  251   MPV  11.7  12.4*  12.8*     Lab Results   Component Value Date    CKTOTAL 22 10/09/2018    CKMB 2.0 01/19/2017    TROPONINI 0.02 10/09/2018    TROPONINI 0.02 10/08/2018    TROPONINI 0.02 10/08/2018     Lab Results   Component Value Date    INR 1.3 11/18/2018    INR 1.3 11/17/2018    INR 1.2 11/16/2018    PROTIME 14.6 (H) 11/18/2018    PROTIME 14.4 (H) 11/17/2018    PROTIME 14.2 (H) 11/16/2018     Lab Results   Component Value Date    TSH 3.550 11/13/2017     Lab Results   Component Value Date    LABA1C 7.2 (H) 10/05/2018     No results found for: EAG  Lab Results   Component Value Date    CHOL 139 11/14/2017    CHOL 77 12/03/2014    CHOL 107 04/06/2014     Lab Results   Component Value Date    TRIG 81 11/14/2017    TRIG 74 12/03/2014    TRIG 38 04/06/2014     Lab Results   Component Value Date    HDL 37 11/14/2017    HDL 23 12/03/2014    HDL 46 04/06/2014     Lab Results   Component Value Date    LDLCALC 86 11/14/2017    LDLCALC 39 12/03/2014    LDLCALC 53 04/06/2014     Lab Results   Component Value Date    LABVLDL 16 11/14/2017    LABVLDL 15 12/03/2014    LABVLDL 8 04/06/2014     No results found for: CHOLHDLRATIO    Cardiac Tests:  ECG:     Echocardiogram: 11/14/17     Summary   Technically limited study - no Definity used   Left ventricle was not well visualized.   Moderately dilated left ventricle.   Septal motion consistent with paced rhythm .   Stage II diastolic dysfunction.   Measured ejection fraction is 25 %. [address] mitral regurgitation is present.     Tele strips:  V paced   Diagnostic:  CXR 11/16  1. Interval increase bilateral perihilar opacities representing  pulmonary edema or infiltrates. 2. Small left-sided pleural effusion. 3. Stable cardiomegaly with post-CABG changes, left-sided  biventricular pacemaker.   4. Right internal jugular Port-A-Cath

## 2018-11-18 NOTE — PROGRESS NOTES
Date: 11/17/2018    Time: 11:53 PM    Patient Placed On BIPAP/CPAP/ Non-Invasive Ventilation? No    If no must comment. Facial area red/color change? No           If YES are Blister/Lesion present? No   If yes must notify nursing staff  BIPAP/CPAP skin barrier?   Yes    Skin barrier type:Liquicel       Comments: Remains on from previous shift        [facility]

## 2018-11-18 NOTE — PROGRESS NOTES
Dr. Jumana Connolly said she would check with Dr. Humera Gutierrez regarding Coumadin. Continue to hold Coumadin for now.

## 2018-11-18 NOTE — PROGRESS NOTES
Pulmonary Progress Note    Admit Date: 2018  Hospital day                               PCP: Rio Cano MD    Chief Complaint (s):  Patient Active Problem List   Diagnosis    Coronary artery disease involving native coronary artery of native heart without angina pectoris    Biventricular implantable cardioverter-defibrillator in situ    Hyperlipidemia with target LDL less than 100    Permanent atrial fibrillation ([facility]    Diabetes mellitus type 2, uncontrolled ([facility]    CKD (chronic kidney disease) stage 3, GFR 30-59 ml/min (HCC)    Invasive carcinoma of urinary bladder (HCC)    Bladder cancer ([facility]    Benign prostatic hyperplasia    Hypoglycemia    Congestive heart failure ([facility]    HTN (hypertension), benign    Acute kidney injury ([facility]    Acute cystitis    Urinary tract infection with hematuria    Bladder spasms    UTI (urinary tract infection)    DANA (acute kidney injury) ([facility]       Subjective:  · Intermittently agitated.       Vitals:  VITALS:  BP (!) 116/59   Pulse 74   Temp 97.6 °F (36.4 °C) (Oral)   Resp 18   Ht 6' 1\" (1.854 m)   Wt 204 lb 3 oz (92.6 kg)   SpO2 100%   BMI 26.94 kg/m²     24HR INTAKE/OUTPUT:      Intake/Output Summary (Last 24 hours) at 18 1741  Last data filed at 18 1643   Gross per 24 hour   Intake              580 ml   Output             2550 ml   Net            -1970 ml       24HR PULSE OXIMETRY RANGE:    SpO2  Av.4 %  Min: 99 %  Max: 100 %    Medications:  IV:   dextrose         Scheduled Meds:   potassium chloride  40 mEq Oral Daily    furosemide  40 mg Intravenous Daily    insulin glargine  20 Units Subcutaneous Nightly    piperacillin-tazobactam  3.375 g Intravenous Q8H    insulin lispro  0-12 Units Subcutaneous TID     insulin lispro  0-6 Units Subcutaneous Nightly    allopurinol  300 mg Oral Lunch    ferrous sulfate  325 mg Oral Daily with breakfast    hydrALAZINE  25 mg Oral 3 times per day   

## 2018-11-19 NOTE — PROGRESS NOTES
Physical Therapy  Facility/Department: [address] INTERMEDIATE 1  Daily Treatment Note  NAME: Esteban Curtis  : 1940  MRN: [id number]    Date of Service: 2018    Patient Diagnosis(es): The primary encounter diagnosis was DANA (acute kidney injury) ([address] Diagnoses of Hematuria, unspecified type, Leukocytosis, unspecified type, and Hypoglycemia were also pertinent to this visit.      has a past medical history of Arthritis; CAD (coronary artery disease); Cardiac defibrillator in place; CHF (congestive heart failure) ([address] Dementia; Diabetes mellitus ([address] Dizziness; Dribbling of urine; Hematuria; HFrEF (heart failure with reduced ejection fraction) ([address] History of blood transfusion; [place] (hard of hearing); Hypertension; Invasive carcinoma of urinary bladder ([address] Ischemic cardiomyopathy; CHRISTINE treated with BiPAP; Paroxysmal atrial fibrillation ([address] and Ventricular tachycardia ([address] has a past surgical history that includes Dilatation, esophagus (); Cholecystectomy (); Tonsillectomy; Colonoscopy; ECHO Compl W Dop Color Flow (2013); Cystocopy (x 2); TURP (16); Cystocopy (2016); Ureter stent placement (Left, 2016); Cystocopy (Left, 2016); Coronary artery bypass graft (); ablation of dysrhythmic focus (2017); Cystocopy (Left, 2017); Abdomen surgery (); eye surgery; Cystoscopy (07/10/2017); Cystocopy (Left, 2017); Cardiac defibrillator placement (Left,  ); Cardiac catheterization (2017); pr cystourethroscopy,ureter catheter (Left, 2018); and pr cystourethroscopy,ureter catheter (Left, 2018).    Evaluating Therapist: Samuel Rowe PT     Room #:443  DIAGNOSIS: Acute Kidney Injury  PMH: dementia  PRECAUTIONS: falls, CBI     Per chart:  Social:  Pt lives with wife in a 2 floor plan with 1st floor setup 2 steps  to enter. Prior to admission ambulates with ww.   Sleeps in lift chair                Initial Evaluation  Date: 11/15 DPT  [id number]

## 2018-11-19 NOTE — PROGRESS NOTES
Inpatient Cardiology Progress note     PATIENT IS BEING FOLLOWED FOR: HFrEF, Afib, CAD, ICD    Meri Beyer is a 69 y.o. male known to Dr. Yolis Dominguez and Dr. Gaby Decker. SUBJECTIVE: Patient resting comfortably in chair. He admits to fatigue. He denies any chest pain, palpitations, or SOB. OBJECTIVE: No apparent distress     ROS:  Consist: Denies fevers, chills or night sweats  Heart: Denies chest pain, palpitations, lightheadedness, dizziness or syncope  Lungs: Denies SOB, cough, wheezing, orthopnea or PND  GI: Denies abdominal pain, vomiting or diarrhea    PHYSICAL EXAM:   BP (!) 146/75   Pulse 74   Temp 97.9 °F (36.6 °C) (Axillary)   Resp 20   Ht 6' 1\" (1.854 m)   Wt 203 lb 14.4 oz (92.5 kg)   SpO2 100%   BMI 26.90 kg/m²    B/P Range last 24 hours: Systolic (09QVE), LZY:193 , Min:116 , OTIS:457    Diastolic (21QYU), HZJ:75, Min:59, Max:79    CONST: Well developed, well nourished who appears stated age. Awake, alert and cooperative. No apparent distress  HEENT:   Head- Normocephalic, atraumatic   Eyes- Conjunctivae pink, anicteric  Throat- Oral mucosa pink and moist  Neck-  No stridor, trachea midline, no jugular venous distention. No carotid bruit  CHEST: Chest symmetrical and non-tender to palpation. No accessory muscle use or intercostal retractions  RESPIRATORY:  Lung sounds - diminished BS noted bilaterally. Mild bibasilar rales  CARDIOVASCULAR:     Heart Inspection- shows no noted pulsations  Heart Palpation- no heaves or thrills; PMI is non-displaced   Heart Ausculation- irregular rate and rhythm,1/6 DONNA noted. PV: trace bilateral lower extremity edema. No varicosities. Pedal pulses palpable, no clubbing or cyanosis   ABDOMEN: Soft, non-tender to light palpation. Bowel sounds present. No palpable masses no organomegaly; no abdominal bruit  MS: No atrophy or abnormal movements. : Deferred  SKIN: Warm and dry + statis dermatitis  NEURO / PSYCH: Oriented to person, place and time.  Speech clear and appropriate. Follows all commands. Pleasant affect       Intake/Output Summary (Last 24 hours) at 11/19/18 0919  Last data filed at 11/19/18 0234   Gross per 24 hour   Intake              800 ml   Output             1775 ml   Net             -975 ml       Weight:   Wt Readings from Last 3 Encounters:   11/19/18 203 lb 14.4 oz (92.5 kg)   11/11/18 200 lb (90.7 kg)   11/05/18 194 lb 6 oz (88.2 kg)     Current Inpatient Medications:   potassium chloride  40 mEq Oral Daily    furosemide  40 mg Intravenous Daily    insulin glargine  20 Units Subcutaneous Nightly    piperacillin-tazobactam  3.375 g Intravenous Q8H    insulin lispro  0-12 Units Subcutaneous TID WC    insulin lispro  0-6 Units Subcutaneous Nightly    allopurinol  300 mg Oral Lunch    ferrous sulfate  325 mg Oral Daily with breakfast    hydrALAZINE  25 mg Oral 3 times per day    isosorbide mononitrate  60 mg Oral BID    metoprolol succinate  200 mg Oral BID    mexiletine  250 mg Oral 2 times per day    mirtazapine  30 mg Oral Nightly    sodium chloride flush  10 mL Intravenous 2 times per day    mineral oil-hydrophilic petrolatum   Topical BID       IV Infusions (if any):   dextrose         DIAGNOSTIC/ LABORATORY DATA:  Labs:   CBC:   Recent Labs      11/18/18   1050  11/19/18   0505   WBC  14.0*  13.1*   HGB  8.4*  8.2*   HCT  28.1*  27.9*   PLT  251  258     BMP: Recent Labs      11/18/18   1050  11/19/18   0505   NA  141  143   K  3.4*  3.6   CO2  30*  28   BUN  21  20   CREATININE  1.3*  1.2   LABGLOM  54  59   CALCIUM  8.5*  8.3*     Mag:   Recent Labs      11/18/18   1050   MG  1.9     Phos:   Recent Labs      11/18/18   1050   PHOS  2.8     TSH: No results for input(s): TSH in the last 72 hours. HgA1c:   Lab Results   Component Value Date    LABA1C 7.2 (H) 10/05/2018     No results found for: EAG    BNP: No results for input(s): BNP in the last 72 hours.   PT/INR:   Recent Labs      11/18/18   1050  11/19/18   0505   PROTIME  14.6* creatinine  6. Will follow along        Will discuss with Dr. Romulo Howe MonGreg  Cardiology    Electronically signed by REINA Crystal on 11/19/2018 at 9:19 AM   Patient seen and examined:   Heart: RRR Lungs: Few crackles    Above PA/NP note reviewed. Imp: 1. Chronic systolic CHF 2. Permanent atrial fibrillation-OAC on hold pending urology approval  Continue IV diuretic therapy/beta blocker/hydralazine and Imdur  Monitor lites. I/O  Rafael Laguna MD

## 2018-11-19 NOTE — PROGRESS NOTES
11/19/2018 9:40 AM  Service: Urology  Group: [facility] ([facility])    Jennifer Clayton  [id number]    Subjective:  Afebrile  Up in chair  Feeling \"pretty good\"  Urine yellow  CBI is off      Review of Systems  Respiratory: negative for cough and sputum  Cardiovascular: negative for chest pain and palpitations  Gastrointestinal: negative for constipation, diarrhea and vomiting  Hematologic/lymphatic: negative for hematuria  Musculoskeletal:negative for back pain  Neurological: negative for dizziness and headaches  Endocrine: negative    Scheduled Meds:   potassium chloride  40 mEq Oral Daily    furosemide  40 mg Intravenous Daily    insulin glargine  20 Units Subcutaneous Nightly    piperacillin-tazobactam  3.375 g Intravenous Q8H    insulin lispro  0-12 Units Subcutaneous TID WC    insulin lispro  0-6 Units Subcutaneous Nightly    allopurinol  300 mg Oral Lunch    ferrous sulfate  325 mg Oral Daily with breakfast    hydrALAZINE  25 mg Oral 3 times per day    isosorbide mononitrate  60 mg Oral BID    metoprolol succinate  200 mg Oral BID    mexiletine  250 mg Oral 2 times per day    mirtazapine  30 mg Oral Nightly    sodium chloride flush  10 mL Intravenous 2 times per day    mineral oil-hydrophilic petrolatum   Topical BID       Objective:  Vitals:    11/19/18 0744   BP: (!) 146/75   Pulse: 74   Resp: 20   Temp: 97.9 °F (36.6 °C)   SpO2: 100%         Allergies: Multaq [dronedarone]; Amiodarone; Bactrim [sulfamethoxazole-trimethoprim]; Iv dye [iodides]; Pollen extract; Tikosyn [dofetilide]; and Doxycycline    General Appearance: alert and conversing. In no acute distress. Up in chair and tolerating well. Skin: warm and dry. Pulmonary/Chest:  normal air movement, no respiratory distress   Abdomen: soft, non-tender, non-distended    Gray well positioned and draining yellow urine with some sediment.     Labs:     Recent Labs      11/19/18   0505   NA  143   K  3.6   CL  104   CO2  28   BUN

## 2018-11-19 NOTE — PROGRESS NOTES
therapist arrival to the room. Upon standing, pt incontinent of urine. Assistance required to change clothing and wash LE's. Pt with O2 removed. O2 saturation 92% and above during session. Education:  Daily orientation, walker safety    Equipment Recommendations:  Continue to assess     AM-PAC Inpatient Daily Activity Raw Score:  14    Pain Level: /10   Additional Notes:  Pt did not report pain  Patient has made  progress during treatment sessions toward set goals. [x] Continue with current OT Plan of care.   [] Prepare for Discharge    Echo GONZÁLES/L [id number]    Total Tx Time: 25

## 2018-11-19 NOTE — CONSULTS
Nightly Hilda Pierson MD   20 Units at 11/17/18 2222    glucose (GLUTOSE) 40 % oral gel 15 g  15 g Oral PRN Hilda Pierson MD        dextrose 50 % solution 12.5 g  12.5 g Intravenous PRN Hilda Pierson MD        glucagon (rDNA) injection 1 mg  1 mg Intramuscular PRN Hilda Pierson MD        dextrose 5 % solution  100 mL/hr Intravenous PRN Hilda Pierson MD        piperacillin-tazobactam (ZOSYN) 3.375 g in dextrose 5 % 50 mL IVPB extended infusion (mini-bag)  3.375 g Intravenous Q8H Hilda Pierson MD   Stopped at 11/18/18 1643    insulin lispro (HUMALOG) injection vial 0-12 Units  0-12 Units Subcutaneous TID WC Hilda Pierson MD   4 Units at 11/18/18 1646    insulin lispro (HUMALOG) injection vial 0-6 Units  0-6 Units Subcutaneous Nightly Hilda Pierson MD   2 Units at 11/17/18 2222    sodium chloride flush 0.9 % injection 10 mL  10 mL Intravenous PRN Valiant Clarity Christina DO   10 mL at 11/18/18 1639    allopurinol (ZYLOPRIM) tablet 300 mg  300 mg Oral Lunch Hilda Pierson MD   300 mg at 11/18/18 1201    ferrous sulfate tablet 325 mg  325 mg Oral Daily with breakfast Hilda Pierson MD   325 mg at 11/18/18 0931    hydrALAZINE (APRESOLINE) tablet 25 mg  25 mg Oral 3 times per day Hilda Pierson MD   25 mg at 11/18/18 1531    isosorbide mononitrate (IMDUR) extended release tablet 60 mg  60 mg Oral BID Hilda Pierson MD   60 mg at 11/18/18 0931    metoprolol succinate (TOPROL XL) extended release tablet 200 mg  200 mg Oral BID Hilda Pierson MD   200 mg at 11/18/18 3145    mexiletine (MEXITIL) capsule 250 mg  250 mg Oral 2 times per day Hilda Pierson MD   250 mg at 11/18/18 0931    mirtazapine (REMERON) tablet 30 mg  30 mg Oral Nightly Hilda Pierson MD   30 mg at 11/17/18 2221    sodium chloride flush 0.9 % injection 10 mL  10 mL Intravenous 2 times per day Hilda Pierson MD   10 mL at 11/18/18 0932    magnesium hydroxide (MILK OF MAGNESIA) 400 MG/5ML suspension 30 mL  30 mL Oral Daily PRN Hilda Pierson MD   30 mL at 11/16/18 1245    ondansetron rhythm .   Stage II diastolic dysfunction.   Measured ejection fraction is 25 %.    Mild mitral regurgitation is present.      Signature      ----------------------------------------------------------------   Electronically signed by Nemo Sylvester MD(Interpreting   physician) on 11/14/2017 03:03 PM   ----------------------------------------------------------------     M-Mode/2D Measurements & Calculations                 LV Volume Diastolic: 303.4 ml       AV Cusp Separation: 1.7              LV Volume Systolic: 206.0 ml        cmAO Root Dimension: 2.7 cm   CO: 5.06   LV EDV/LV EDV Index: 266.6 ml/124   l/min      ml/m^2LV ESV/LV ESV Index: 200.1   CI: 2.35   ml/93ml/ m^2   l/m*m^2    EF Calculated: 24.9 %                                                  LA/Aorta: 1.89              LV Length: 9.7 cm                                                  LA volume/Index: 71 ml              LVOT: 2 cm                          /33.03ml/m^2                                                  RA Area: 13.6 cm^2     Doppler Measurements & Calculations      MV Peak E-Wave: 1.1  AV Peak Velocity: 1.76 LVOT Peak Velocity: 1.23 m/s   m/s                  m/s                    LVOT Mean Velocity: 0.86 m/s   MV Peak A-Wave: 0.66 AV Peak Gradient:      LVOT Peak Gradient: 6 mmHgLVOT   m/s                  12.43 mmHg             Mean Gradient: 3.2 mmHg   MV E/A Ratio: 1.67   AV Mean Velocity: 1.19 Estimated RVSP: 25 mmHg   MV Peak Gradient:    m/s                    Estimated RAP:3 mmHg   4.5 mmHg             AV Mean Gradient: 6.4   MV Mean Gradient:    mmHg   2.1 mmHg             AV VTI: 27.9 cm        TR Velocity:2.35 m/s   MV Mean Velocity:    AV Area                TR Gradient:22.03 mmHg   0.68 m/s             (Continuity):2.42 cm^2 PV Peak Velocity: 0.94 m/s   MV Deceleration                             PV Peak Gradient: 3.53 mmHg   Time: 260.2 msec     LVOT VTI: 21.5 cm      PV Mean Velocity: 0.7 m/s   MV P1/2t: 97.7 msec                         PV Mean Gradient: 2.1 mmHg   MVA by PHT:2.25 cm^2 Estimated PASP: 25.03   MV Area              mmHg   (continuity): 1.9   cm^2   MV E' Septal   Velocity: 0.04 m/s   MV E' Lateral   Velocity: 6 m/s    Cardiac Catheterization 17:   CARDIAC CATHETERIZATION     PATIENT NAME: Edy Vasquez                                 :       1940  MED REC NO:   [id number]                                       ROOM:      6421  ACCOUNT NO:   [de-identified]                                     ADMISSION  DATE:  2017  PROVIDER:     Braden Saxena MD     DATE OF PROCEDURE:  2017     PROCEDURE:  Left heart catheterization, selective coronary  angiography, graft angiography x3, right coronary artery angiography  and closure of the access site with the Angio-Seal device.     The patient received intravenous fentanyl for sedation.     INDICATION FOR PROCEDURE:  Ventricular tachycardia storm and known  ischemic cardiomyopathy.     PRESSURES:  1. Aorta 138/71 with a mean of 100.  2.  Left ventricular end-diastolic pressure of 19.  3.  There was no gradient across the aortic valve.     CORONARY ANGIOGRAPHY:  LEFT MAIN:  The left main artery did not appear to have any  significant angiographic disease.     LAD:  The left anterior descending artery appeared very heavily  calcified in its proximal segment. After the first diagonal branch  which is a bifurcating branch, there appeared to be a stent in the LAD  which had around 70% narrowing at its proximal edge with mild in-stent  restenosis. The LAD is occluded after a small second diagonal branch  at the mid vessel. There appeared to be a competitive flow and a  superior subdivision of the first diagonal branch from a patent graft.     LCX:  The left circumflex gives rise to a small caliber along the  first marginal branch, which itself does not appear to have any  significant disease.   There was 20% disease in the proximal third of  the left circumflex after the

## 2018-11-20 NOTE — PROGRESS NOTES
Physical Therapy  Facility/Department: [address] INTERMEDIATE 1  Daily Treatment Note  NAME: Barrera Pate  : 1940  MRN: [id number]    Date of Service: 2018    Patient Diagnosis(es): The primary encounter diagnosis was DANA (acute kidney injury) ([address] Diagnoses of Hematuria, unspecified type, Leukocytosis, unspecified type, and Hypoglycemia were also pertinent to this visit. has a past medical history of Arthritis; CAD (coronary artery disease); Cardiac defibrillator in place; CHF (congestive heart failure) ([address] Dementia; Diabetes mellitus ([address] Dizziness; Dribbling of urine; Hematuria; HFrEF (heart failure with reduced ejection fraction) ([address] History of blood transfusion; [place] (hard of hearing); Hypertension; Invasive carcinoma of urinary bladder ([address] Ischemic cardiomyopathy; CHRISTINE treated with BiPAP; Paroxysmal atrial fibrillation ([address] and Ventricular tachycardia ([address] has a past surgical history that includes Dilatation, esophagus (); Cholecystectomy (); Tonsillectomy; Colonoscopy; ECHO Compl W Dop Color Flow (2013); Cystocopy (x 2); TURP (16); Cystocopy (2016); Ureter stent placement (Left, 2016); Cystocopy (Left, 2016); Coronary artery bypass graft (); ablation of dysrhythmic focus (2017); Cystocopy (Left, 2017); Abdomen surgery (); eye surgery; Cystoscopy (07/10/2017); Cystocopy (Left, 2017); Cardiac defibrillator placement (Left,  ); Cardiac catheterization (2017); pr cystourethroscopy,ureter catheter (Left, 2018); and pr cystourethroscopy,ureter catheter (Left, 2018). Evaluating Therapist: Phill Canavan PT     Room #:443  DIAGNOSIS: Acute Kidney Injury  PMH: dementia  PRECAUTIONS: falls, CBI     Per chart:  Social:  Pt lives with wife in a 2 floor plan with 1st floor setup 2 steps  to enter.   Prior to admission ambulates with ww.  Sleeps in lift chair                       Initial Evaluation  Date: 11/15

## 2018-11-20 NOTE — PROGRESS NOTES
Date: 11/19/2018    Time: 10:05 PM    Patient Placed On BIPAP/CPAP/ Non-Invasive Ventilation? Yes    If no must comment. Facial area red/color change? No           If YES are Blister/Lesion present? No   If yes must notify nursing staff  BIPAP/CPAP skin barrier?   Yes    Skin barrier type:Liquicel       Comments:        Angie Collins

## 2018-11-20 NOTE — PROGRESS NOTES
Occupational Therapy    OCCUPATIONAL THERAPY DAILY NOTE    Date:2018  Patient Name: Katie Fitzgerald  MRN: [id number]  : 1940  Room: [address] [place] [address]     Patient Active Problem List   Diagnosis    Coronary artery disease involving native coronary artery of native heart without angina pectoris    Cardiac resynchronization therapy defibrillator (CRT-D) in place    Hyperlipidemia with target LDL less than 100    [facility] fibrillation [facility]    Diabetes mellitus type 2, uncontrolled ([facility]    CKD (chronic kidney disease) stage 3, GFR 30-59 ml/min ([facility])    Invasive carcinoma of urinary bladder (HCC)    Bladder cancer ([facility]    Chronic systolic CHF (congestive heart failure) ([facility])    Wide-complex tachycardia ([facility])    Benign prostatic hyperplasia    Hypoglycemia    Congestive heart failure ([facility]    HTN (hypertension), benign    Acute kidney injury ([facility]    Acute cystitis    Urinary tract infection with hematuria    Bladder spasms    UTI (urinary tract infection)    DANA (acute kidney injury) ([place] [address].)       Subjective:  Pt initially declined tx but with education on benefits of therapy pt was agreeable to session. Precautions: Falls  Chart Reviewed:  Yes          Independent Supervision Contact Guard Assist Minimal Assist Moderate Assist Maximum Assist Dependent   Feeding          Grooming          UE  Bathing          LE Bathing          UE Dressing          LE  Dressing                x    Toileting                   Comments:  Pt performed LE dressing seated in chair. Pt crossed legs to doff/ don L sock but required assistance to manage R sock due to decreased flexibility. Functional Transfers:  Max vc for encouragement and extended time were required during transfers/ functional mobility due to pt apprehensive of standing. Pt performed STS with Max A x2 and functional mobility with Min A using FWW. Provided pt with vc for hand placement during transfers to prevent falls.     Therapeutic

## 2018-11-20 NOTE — PROGRESS NOTES
Pulmonary Progress Note    Admit Date: 2018  Hospital day                               PCP: Nohemi Naranjo MD    Chief Complaint (s):  Patient Active Problem List   Diagnosis    Coronary artery disease involving native coronary artery of native heart without angina pectoris    Cardiac resynchronization therapy defibrillator (CRT-D) in place    Hyperlipidemia with target LDL less than 100    Permanent [facility] fibrillation [facility]    Diabetes mellitus type 2, uncontrolled ([facility]    CKD (chronic kidney disease) stage 3, GFR 30-59 ml/min ([facility])    Invasive carcinoma of urinary bladder (HCC)    [facility]    Chronic systolic CHF (congestive heart failure) ([facility])    Wide-complex tachycardia ([facility])    Benign prostatic hyperplasia    Hypoglycemia    Congestive heart failure ([facility]    HTN (hypertension), benign    Acute kidney injury ([facility]    Acute cystitis    Urinary tract infection with hematuria    Bladder spasms    UTI (urinary tract infection)    DANA (acute kidney injury) ([facility]       Subjective:  · Up in a chair today but very easily agitated. Getting ready for discharge.       Vitals:  VITALS:  /61   Pulse 74   Temp 97.6 °F (36.4 °C) (Oral)   Resp 18   Ht 6' 1\" (1.854 m)   Wt 188 lb (85.3 kg)   SpO2 100%   BMI 24.80 kg/m²     24HR INTAKE/OUTPUT:      Intake/Output Summary (Last 24 hours) at 18 1122  Last data filed at 18 1018   Gross per 24 hour   Intake              660 ml   Output                0 ml   Net              660 ml       24HR PULSE OXIMETRY RANGE:    SpO2  Av.3 %  Min: 92 %  Max: 100 %    Medications:  IV:   dextrose         Scheduled Meds:   bumetanide  2 mg Oral BID    potassium chloride  40 mEq Oral Daily    insulin glargine  20 Units Subcutaneous Nightly    piperacillin-tazobactam  3.375 g Intravenous Q8H    insulin lispro  0-12 Units Subcutaneous TID WC    insulin lispro  0-6 Units Subcutaneous Nightly    allopurinol

## 2018-11-20 NOTE — CARE COORDINATION
Social Work/Discharge Planning:  HHC and discharge order noted. Called Alma Rosa Johnson from [facility] ([phone]) and notified her of discharge order. Faxed home health care order to [facility].   Electronically signed by SAEID Rodas on 11/20/2018 at 10:39 AM

## 2018-11-23 PROBLEM — N17.9 AKI (ACUTE KIDNEY INJURY) (HCC): Status: RESOLVED | Noted: 2018-01-01 | Resolved: 2018-01-01

## 2018-11-23 PROBLEM — N32.89 BLADDER SPASMS: Status: RESOLVED | Noted: 2018-01-01 | Resolved: 2018-01-01

## 2018-11-23 PROBLEM — N39.0 UTI (URINARY TRACT INFECTION): Status: RESOLVED | Noted: 2018-01-01 | Resolved: 2018-01-01

## 2018-11-23 PROBLEM — N17.9 ACUTE KIDNEY INJURY (HCC): Status: RESOLVED | Noted: 2018-01-01 | Resolved: 2018-01-01

## 2018-11-23 PROBLEM — N39.0 URINARY TRACT INFECTION WITH HEMATURIA: Status: RESOLVED | Noted: 2018-01-01 | Resolved: 2018-01-01

## 2018-11-23 PROBLEM — N30.00 ACUTE CYSTITIS: Status: RESOLVED | Noted: 2018-01-01 | Resolved: 2018-01-01

## 2018-11-23 PROBLEM — N17.9 ACUTE KIDNEY INJURY (HCC): Status: ACTIVE | Noted: 2018-01-01

## 2018-11-23 PROBLEM — R31.9 URINARY TRACT INFECTION WITH HEMATURIA: Status: RESOLVED | Noted: 2018-01-01 | Resolved: 2018-01-01

## 2018-11-23 PROBLEM — E16.2 HYPOGLYCEMIA: Status: RESOLVED | Noted: 2017-02-23 | Resolved: 2018-01-01

## 2018-11-23 NOTE — ED NOTES
ATTENDING PROVIDER ATTESTATION:     Shamika Mendez presented to the emergency department for evaluation of Fatigue (from home, increasingly weak and confused over the past 24 hours); Extremity Weakness; and Altered Mental Status   and was initially evaluated by the Medical Resident. See Original ED Note for H&P and ED course above. I have reviewed and discussed the case, including pertinent history (medical, surgical, family and social) and exam findings with the Medical Resident assigned to Shamika Mendez. I have personally performed and/or participated in the history, exam, medical decision making, and procedures and agree with all pertinent clinical information and any additional changes or corrections are noted below in my assessment and plan. I have discussed this patient in detail with the resident, and provided the instruction and education,       I have reviewed my findings and recommendations with the assigned Medical Resident, Shamika Mendez and members of family present at the time of disposition. My Assessment/Plan: The following summarizes my clinical findings and independent assessment:     History:  Shamika Mendez is a 69 y.o. male presenting to the ED for fatigue and not feeling well, beginning prior to arrival.  The complaint has been persistent, moderate in severity, and worsened by nothing. Brought to ED for 1 week of worsening fatigue, inability to get out of the chair and now with confusion over the last 24 hrs. Wife is concerned for UTI as she says his urine is very dirty and smell bad. He says he is tired.  He denies chest pain or shortness of breath            Review of Systems:   Pertinent positives and negatives are stated within HPI, all other systems reviewed and are negative.    --------------------------------------------- PAST HISTORY ---------------------------------------------  Past Medical History:  has a past medical history of Arthritis; CAD

## 2018-11-23 NOTE — ED NOTES
Bed: 25  Expected date:   Expected time:   Means of arrival:   Comments:  Mellisa Parks, RN  11/23/18 0634

## 2018-11-24 PROBLEM — N30.01 ACUTE CYSTITIS WITH HEMATURIA: Status: ACTIVE | Noted: 2018-01-01

## 2018-11-24 NOTE — PLAN OF CARE
Problem: Falls - Risk of:  Goal: Will remain free from falls  Will remain free from falls   Outcome: Met This Shift      Problem: Skin Integrity:  Goal: Will show no infection signs and symptoms  Will show no infection signs and symptoms   Outcome: Ongoing    Goal: Absence of new skin breakdown  Absence of new skin breakdown   Outcome: Met This Shift

## 2018-11-24 NOTE — CONSULTS
CPAP Machine MISC by Does not apply route nightly   Yes Historical Provider, MD   warfarin (COUMADIN) 3 MG tablet Take 3 mg by mouth nightly    Yes Historical Provider, MD   ferrous sulfate 325 (65 Fe) MG tablet Take 325 mg by mouth Daily with lunch    Yes Historical Provider, MD   bumetanide (BUMEX) 2 MG tablet Take 1 tablet by mouth daily  Patient taking differently: Take 2 mg by mouth 2 times daily  10/12/18  Yes Juan Manuel Kaufman DO   mineral oil-hydrophilic petrolatum (AQUAPHOR) ointment Apply topically as needed. Patient taking differently: Apply topically daily  10/12/18  Yes Juan Manuel Kaufman DO   mirtazapine (REMERON) 30 MG tablet Take 30 mg by mouth nightly   Yes Historical Provider, MD   mexiletine (MEXITIL) 250 MG capsule TAKE 1 CAPSULE THREE TIMES DAILY  Patient taking differently: TAKE 1 CAPSULE BY MOUTH THREE TIMES DAILY 7/9/18  Yes Dereck Maria DO   vitamin D (CHOLECALCIFEROL) 1000 UNIT TABS tablet Take 1,000 Units by mouth Daily with lunch    Yes Historical Provider, MD   metoprolol succinate (TOPROL XL) 200 MG extended release tablet Take 1 tablet by mouth 2 times daily 5/13/17  Yes Mariah Norwood MD   potassium chloride (KLOR-CON M) 20 MEQ extended release tablet Take 20 mEq by mouth 2 times daily   Yes Historical Provider, MD   allopurinol (ZYLOPRIM) 300 MG tablet Take 300 mg by mouth Daily with lunch  9/19/16  Yes Historical Provider, MD   azelastine (ASTELIN) 0.1 % nasal spray 1 spray by Nasal route 2 times daily as needed for Rhinitis  5/2/16  Yes Historical Provider, MD   isosorbide mononitrate (IMDUR) 60 MG CR tablet Take 60 mg by mouth 2 times daily    Yes Historical Provider, MD   OXYGEN Inhale 3 L/min into the lungs as needed    Yes Historical Provider, MD   hydrALAZINE (APRESOLINE) 25 MG tablet Take 1 tablet by mouth every 8 hours.  3/16/15  Yes Sindy Bautista DO   metolazone (ZAROXOLYN) 2.5 MG tablet Take 2.5 mg by mouth daily as needed (swelling) Take for daily for 3 days for swelling Component Value Date    LABA1C 7.1 (H) 11/24/2018     No results found for: EAG  proBNP:   Recent Labs      11/23/18   1042   PROBNP  21,329*     PT/INR:   Recent Labs      11/23/18   1042   PROTIME  14.3*   INR  1.3     APTT:No results for input(s): APTT in the last 72 hours. CARDIAC ENZYMES:  Recent Labs      11/23/18   1042   TROPONINI  0.03     FASTING LIPID PANEL:  Lab Results   Component Value Date    CHOL 139 11/14/2017    HDL 37 11/14/2017    LDLCALC 86 11/14/2017    TRIG 81 11/14/2017     LIVER PROFILE:  Recent Labs      11/24/18   0538   AST  33   ALT  22   LABALBU  2.8*     Assessment and Recommendations are to follow as per Dr. Enrique Braga. [address], [address] Cardiology    Electronically signed by REINA Johnson on 11/24/2018 at 2:26 PM     The above documentation has been prepared under my direction and personally reviewed by me in its entirety. I confirm that the note above accurately reflects all work, treatment, procedures, and medical decision making performed by me. The patient's history was independently obtained. The patient was independently examined. Electrocardiogram, prior and present cardiovascular assessment, and laboratory studies were reviewed. The patient is a 60-year-old white male known to [facility] cardiology with primary cardiovascular care provided by Lucie Todd an electrophysiology care by Chelsey Smith. He has a known history of remote surgical revascularization along with that of an ischemic cardiomyopathy with severe left ventricle systolic dysfunction and an estimated ejection fraction of 25% with paroxysmal atrial fibrillation having failed multiple antiarrhythmic agents including that of dofetilide with intolerance of amiodarone and a previous history of recurrent ventricular tachycardia including that of storm necessitating present antiarrhythmic therapy with mexiletine.  At the time of his most recent objective assessment in November, 2017, an echocardiogram presence of a soft apical holosystolic murmur. A benign abdominal examination is present and minimal lower extremity edema noted. Diagnostic Assessment and Plan: On a clinical basis, the patient presents with exacerbation of chronic systolic heart failure potentially in part related to the loss of atrioventricular synchrony in the face of atrial fibrillation as well as that of a potential superimposed infection and acute kidney injury. At present in spite of the latter, he appears volume overloaded with plans of a discontinuation of intravenous fluids and the administration of a dose of diuretics. Continued careful monitoring of his hemodynamics as well as that of renal function electrolytes will be necessary to assist in further optimization of his medical regimen. Following stabilization, and in conjunction with the urology service in regards to prognosis related to his carcinoma, further electrophysiologic assessment may be indicated regards to potential management options of maintaining sinus rhythm if spontaneous conversion does not occur. Careful monitoring of anticoagulation will be necessitated in view of his recent hematuria with continued goals of maintaining prothrombin times in the range of 2.0-2.5 times INR control. Aggressive risk factor modification of blood pressure and serum lipids will be essential to reducing risk of future atherosclerotic development. Thank you for allowing me to participate in your patient's care. Please feel free to contact me if you have any questions or concerns. Mildred Armstrong.  Abi Andre, [address] [place] [address] Cardiology

## 2018-11-24 NOTE — CONSULTS
treated with Diflucan.   Urine culture is pending but currently without growth.       POLO Mobley AM  11/24/2018

## 2018-11-24 NOTE — PROGRESS NOTES
Physical Therapy    Facility/Department: [facility] MED SURG  Initial Assessment    NAME: Timi Roy  : 1940  MRN: [id number]    Date of Service: 2018    Discharge Recommendations:      PT Equipment Recommendations  Equipment Needed: Yes  Mobility Devices: Tura Broar: Rolling    Patient Diagnosis(es): The primary encounter diagnosis was Acute cystitis with hematuria. Diagnoses of Candida UTI and General weakness were also pertinent to this visit. has a past medical history of Arthritis; CAD (coronary artery disease); Cardiac defibrillator in place; CHF (congestive heart failure) ([address] Dementia; Diabetes mellitus ([address] Dizziness; Dribbling of urine; Hematuria; HFrEF (heart failure with reduced ejection fraction) ([address] History of blood transfusion; [place] (hard of hearing); Hypertension; Invasive carcinoma of urinary bladder ([address] Ischemic cardiomyopathy; CHRISTINE treated with BiPAP; Paroxysmal atrial fibrillation ([address] and Ventricular tachycardia ([address] has a past surgical history that includes Dilatation, esophagus (); Cholecystectomy (); Tonsillectomy; Colonoscopy; ECHO Compl W Dop Color Flow (2013); Cystocopy (x 2); TURP (16); Cystocopy (2016); Ureter stent placement (Left, 2016); Cystocopy (Left, 2016); Coronary artery bypass graft (); ablation of dysrhythmic focus (2017); Cystocopy (Left, 2017); eye surgery; Cystoscopy (07/10/2017); Cystocopy (Left, 2017); Cardiac defibrillator placement (Left,  ); Cardiac catheterization (2017); pr cystourethroscopy,ureter catheter (Left, 2018); pr cystourethroscopy,ureter catheter (Left, 2018); Small intestine surgery (); and Cardiac defibrillator placement (2014).     Restrictions  Restrictions/Precautions  Restrictions/Precautions: General Precautions, Fall Risk  Required Braces or Orthoses?: No  Vision/Hearing  Vision: Within Functional Limits  Hearing: Exceptions to cognition  Assessment: Ambulatory dysfunction  Prognosis: Good  REQUIRES PT FOLLOW UP: Yes  Activity Tolerance  Activity Tolerance: Patient limited by fatigue;Patient limited by endurance; Other  Activity Tolerance: Fear         Plan   Plan  Times per week: 3x/week  Current Treatment Recommendations: Strengthening, Balance Training, Cognitive/Perceptual Training, Functional Mobility Training, Endurance Training, Transfer Training, Safety Education & Training, Neuromuscular Re-education, Gait Training, Patient/Caregiver Education & Training, Equipment Evaluation, Education, & procurement  Plan Comment: [employer] 8/24    G-Code     OutComes Score                                           AM-PAC Score             Goals  Short term goals  Time Frame for Short term goals: 1 Week  Short term goal 1: General MMT BLE will imp 1 grade   Short term goal 2: Bed mobility will imp to Mod x 2-1  Short term goal 3: Transfers will advance to Min x 2-1  Short term goal 4: Imp endurances for Amb with [employer] 30+' Min/CGA  Patient Goals   Patient goals : Confusion       Therapy Time   Individual Concurrent Group Co-treatment   Time In           Time Out           Minutes                   Ele Souza, PT

## 2018-11-24 NOTE — H&P
for abdominal pain and vomiting  Genitourinary:positive for hematuria, negative for dysuria  Integument/breast: negative for pruritus and rash  Hematologic/lymphatic: negative for bleeding and easy bruising  Musculoskeletal:negative for arthralgias and back pain  Neurological: negative for coordination problems and gait problems  Behavioral/Psych: negative for anxiety and depression  Endocrine: negative for temperature intolerance  Allergic/Immunologic: negative for anaphylaxis and angioedema    PHYSICAL EXAM:    Vitals:  BP (!) 141/77   Pulse 74   Temp 98.4 °F (36.9 °C) (Oral)   Resp 22   Ht 6' 1\" (1.854 m)   Wt 203 lb (92.1 kg)   SpO2 98%   BMI 26.78 kg/m²     General appearance: alert, appears stated age, cooperative and distracted  Head: Normocephalic, without obvious abnormality, atraumatic  Eyes: conjunctivae/corneas clear. PERRL, EOM's intact. Fundi benign. Ears: normal TM's and external ear canals both ears  Nose: Nares normal. Septum midline. Mucosa normal. No drainage or sinus tenderness.   Throat: lips, mucosa, and tongue normal; teeth and gums normal  Neck: no adenopathy, no carotid bruit, no JVD, supple, symmetrical, trachea midline and thyroid not enlarged, symmetric, no tenderness/mass/nodules  Lungs: diminished breath sounds bibasilar  Heart: irregularly irregular rhythm  Abdomen: soft, non-tender; bowel sounds normal; no masses,  no organomegaly  Extremities: extremities normal, atraumatic, no cyanosis or edema  Pulses: 2+ and symmetric  Skin: Skin color, texture, turgor normal. No rashes or lesions  Neurologic: Grossly normal  Results      Component Value Units   Culture Blood #2 [[id number]] Collected: 11/23/18 1117   Updated: 11/24/18 1535    Specimen Source: Blood     Culture, Blood 2 24 Hours- no growth   Narrative:     Source: BLOOD       Site:              Culture Blood #1 [[id number]] Collected: 11/23/18 1117   Updated: 11/24/18 1535    Specimen Source: Blood     Blood Culture, Routine (H) 1.80 - 7.30 E9/L     Immature Granulocytes # 0.06 E9/L     Lymphocytes # 1.54 1.50 - 4.00 E9/L     Monocytes # 0.88 0.10 - 0.95 E9/L     Eosinophils # 1.64 (H) 0.05 - 0.50 E9/L     Basophils # 0.06 0.00 - 0.20 P2/X   Basic Metabolic Panel w/ Reflex to MG   Result Value Ref Range     Sodium 141 132 - 146 mmol/L     Potassium reflex Magnesium 4.7 3.5 - 5.0 mmol/L     Chloride 103 98 - 107 mmol/L     CO2 26 22 - 29 mmol/L     Anion Gap 12 7 - 16 mmol/L     Glucose 171 (H) 74 - 99 mg/dL     BUN 31 (H) 8 - 23 mg/dL     CREATININE 1.7 (H) 0.7 - 1.2 mg/dL     GFR Non- 39 >=60 mL/min/1.73     GFR African American 47       Calcium 9.1 8.6 - 10.2 mg/dL   Troponin   Result Value Ref Range     Troponin 0.03 0.00 - 0.03 ng/mL   Protime-INR   Result Value Ref Range     Protime 14.3 (H) 9.3 - 12.4 sec     INR 1.3     Urinalysis   Result Value Ref Range     Color, UA Yellow Straw/Yellow     Clarity, UA CLOUDY (A) Clear     Glucose, Ur Negative Negative mg/dL     Bilirubin Urine Negative Negative     Ketones, Urine Negative Negative mg/dL     Specific Gravity, UA 1.025 1.005 - 1.030     Blood, Urine LARGE (A) Negative     pH, UA 6.0 5.0 - 9.0     Protein,  (A) Negative mg/dL     Urobilinogen, Urine 0.2 <2.0 E.U./dL     Nitrite, Urine Negative Negative     Leukocyte Esterase, Urine LARGE (A) Negative   Brain Natriuretic Peptide   Result Value Ref Range     Pro-BNP 21,329 (H) 0 - 450 pg/mL   Microscopic Urinalysis   Result Value Ref Range     WBC, UA >20 0 - 5 /HPF     RBC, UA 1-3 0 - 2 /HPF     Epi Cells FEW /HPF     Bacteria, UA NONE /HPF     Yeast, UA RARE     POCT Glucose   Result Value Ref Range     Glucose 168 mg/dL     QC OK?  yes     POCT Glucose   Result Value Ref Range     Meter Glucose 168 (H) 74 - 99 mg/dL   EKG 12 Lead   Result Value Ref Range     Ventricular Rate 75 BPM     Atrial Rate 70 BPM     QRS Duration 160 ms     Q-T Interval 454 ms     QTc Calculation (Bazett) 506 ms     R Axis -132

## 2018-11-26 NOTE — PROGRESS NOTES
Oral Nightly Lisha Cody MD   30 mg at 11/25/18 2142    potassium chloride (KLOR-CON M) extended release tablet 20 mEq  20 mEq Oral BID Lisha Cody MD   20 mEq at 11/26/18 0914    vitamin D (CHOLECALCIFEROL) tablet 1,000 Units  1,000 Units Oral Lunch Lisha Cody MD   1,000 Units at 11/26/18 1342    sodium chloride flush 0.9 % injection 10 mL  10 mL Intravenous 2 times per day Lisha Cody MD   10 mL at 11/26/18 0914    sodium chloride flush 0.9 % injection 10 mL  10 mL Intravenous PRN Lisha Cody MD   10 mL at 11/24/18 1141    magnesium hydroxide (MILK OF MAGNESIA) 400 MG/5ML suspension 30 mL  30 mL Oral Daily PRN Lisha Cody MD        ondansetron [facility]) injection 4 mg  4 mg Intravenous Q6H PRN Lisha Cody MD   4 mg at 11/23/18 2021    enoxaparin (LOVENOX) injection 40 mg  40 mg Subcutaneous Daily Chaz Bonilla DO   40 mg at 11/25/18 1850    glucose (GLUTOSE) 40 % oral gel 15 g  15 g Oral PRN Lisha Cody MD        dextrose 50 % solution 12.5 g  12.5 g Intravenous PRN Lisha Cody MD        glucagon (rDNA) injection 1 mg  1 mg Intramuscular PRN Lisha Cody MD        dextrose 5 % solution  100 mL/hr Intravenous PRN Lisha Cody MD        fluconazole (DIFLUCAN) tablet 200 mg  200 mg Oral Daily Lisha Cody MD   200 mg at 11/25/18 1850       Problem list:    Patient Active Problem List   Diagnosis    Coronary artery disease involving native coronary artery of native heart without angina pectoris    Cardiac resynchronization therapy defibrillator (CRT-D) in place    Hyperlipidemia with target LDL less than 100    Permanent atrial fibrillation (HCC)    Type 2 diabetes mellitus with stage 3 chronic kidney disease, with long-term current use of insulin (HCC)    CKD (chronic kidney disease) stage 3, GFR 30-59 ml/min (HCC)    Invasive carcinoma of urinary bladder (HCC)    Chronic systolic CHF (congestive heart failure) ([address]    Wide-complex tachycardia ([address]

## 2018-11-26 NOTE — PROGRESS NOTES
Collected: 11/24/18 2205   Updated: 11/24/18 2227     Meter Glucose 135 (H)          Current Facility-Administered Medications   Medication Dose Route Frequency Provider Last Rate Last Dose    furosemide (LASIX) injection 40 mg  40 mg Intravenous BID Fatuma Mata MD   40 mg at 11/25/18 1850    insulin lispro (HUMALOG) injection vial 0-12 Units  0-12 Units Subcutaneous TID WC Liliane Dumont DO   2 Units at 11/25/18 1620    insulin lispro (HUMALOG) injection vial 0-6 Units  0-6 Units Subcutaneous Nightly Liliane Dumont DO        mineral oil-hydrophilic petrolatum (AQUAPHOR) ointment   Topical BID PRN Liliane Dumont DO        allopurinol (ZYLOPRIM) tablet 300 mg  300 mg Oral Lunch Emerson Ha MD   300 mg at 11/25/18 1141    ferrous sulfate tablet 325 mg  325 mg Oral Lunch Emerson Ha MD   325 mg at 11/25/18 1141    hydrALAZINE (APRESOLINE) tablet 25 mg  25 mg Oral 3 times per day Emerson Ha MD   25 mg at 11/25/18 1612    insulin glargine (LANTUS) injection vial 25 Units  25 Units Subcutaneous QAM Emerson Ha MD   25 Units at 11/25/18 0910    insulin glargine (LANTUS) injection vial 20 Units  20 Units Subcutaneous Nightly Emerson Ha MD   20 Units at 11/24/18 2206    isosorbide mononitrate (IMDUR) extended release tablet 60 mg  60 mg Oral BID Emerson Ha MD   60 mg at 11/25/18 3414    metoprolol succinate (TOPROL XL) extended release tablet 200 mg  200 mg Oral BID Emerson Ha MD   200 mg at 11/25/18 0988    mexiletine (MEXITIL) capsule 250 mg  250 mg Oral 3 times per day Emerson aH MD   250 mg at 11/25/18 1612    mirtazapine (REMERON) tablet 30 mg  30 mg Oral Nightly Emerson Ha MD   30 mg at 11/24/18 2207    potassium chloride (KLOR-CON M) extended release tablet 20 mEq  20 mEq Oral BID Emerson Ha MD   20 mEq at 11/25/18 0910    vitamin D (CHOLECALCIFEROL) tablet 1,000 Units  1,000 Units Oral Lunch Emerson Ha MD   1,000 Units at 11/25/18 1145    sodium chloride flush 0.9 % injection 10 mL  10 mL Intravenous 2 times per day Yarely Dykes MD   10 mL at 11/25/18 0912    sodium chloride flush 0.9 % injection 10 mL  10 mL Intravenous PRN Yarely Dykes MD   10 mL at 11/24/18 1141    magnesium hydroxide (MILK OF MAGNESIA) 400 MG/5ML suspension 30 mL  30 mL Oral Daily PRN Yarely Dykes MD        ondansetron [facility]) injection 4 mg  4 mg Intravenous Q6H PRN Yarely Dykes MD   4 mg at 11/23/18 2021    enoxaparin (LOVENOX) injection 40 mg  40 mg Subcutaneous Daily Yarely Dykes MD   40 mg at 11/25/18 1850    glucose (GLUTOSE) 40 % oral gel 15 g  15 g Oral PRN Yarely Dykes MD        dextrose 50 % solution 12.5 g  12.5 g Intravenous PRN Yarely Dykes MD        glucagon (rDNA) injection 1 mg  1 mg Intramuscular PRN Yarely Dykes MD        dextrose 5 % solution  100 mL/hr Intravenous PRN Yarely Dykes MD        fluconazole (DIFLUCAN) tablet 200 mg  200 mg Oral Daily Yarely Dykes MD   200 mg at 11/25/18 1850       Problem list:    Patient Active Problem List   Diagnosis    Coronary artery disease involving native coronary artery of native heart without angina pectoris    Cardiac resynchronization therapy defibrillator (CRT-D) in place    Hyperlipidemia with target LDL less than 100    Permanent atrial fibrillation (HCC)    Type 2 diabetes mellitus with stage 3 chronic kidney disease, with long-term current use of insulin (HCC)    CKD (chronic kidney disease) stage 3, GFR 30-59 ml/min (HCC)    Invasive carcinoma of urinary bladder (HCC)    Chronic systolic CHF (congestive heart failure) (HCC)    Wide-complex tachycardia (HCC)    Benign prostatic hyperplasia    Persistent atrial fibrillation ([address]    HTN (hypertension), benign    Acute cystitis with hematuria    Acute kidney injury ([address]    Pure hypercholesterolemia       Assessment:        Coronary artery disease involving native coronary artery of native heart without angina

## 2018-11-26 NOTE — PROGRESS NOTES
[facility] Quality Flow/Interdisciplinary Rounds Progress Note        Quality Flow Rounds held on November 26, 2018    Disciplines Attending:  Bedside Nurse, ,  and Nursing Unit Leadership    Luis M Crespo was admitted on 11/23/2018  9:33 AM    Anticipated Discharge Date:  Expected Discharge Date: 11/27/18    Disposition:    Fabian Score:  Fabian Scale Score: 12    Readmission Risk              Risk of Unplanned Readmission:        38           Discussed patient goal for the day, patient clinical progression, and barriers to discharge. The following Goal(s) of the Day/Commitment(s) have been identified:  Wean oxygen as tolerated, waiting on placement, wean IV diuretics.       Jc Rider  November 26, 2018

## 2018-11-26 NOTE — PROGRESS NOTES
Patient bladder scanned PVR 109ml.     Electronically signed by Jonn Parmar RN on 11/26/2018 at 10:52 AM

## 2018-11-27 NOTE — PROGRESS NOTES
Physical Therapy  [facility]ility[facility] MED SURG  Daily Treatment Note  NAME: Timi Roy  : 1940  MRN: [id number]    Date of Service: 2018      Patient Diagnosis(es): The primary encounter diagnosis was Acute cystitis with hematuria. Diagnoses of Candida UTI and General weakness were also pertinent to this visit. has a past medical history of Arthritis; CAD (coronary artery disease); Cardiac defibrillator in place; CHF (congestive heart failure) ([address] Dementia; Diabetes mellitus ([address] Dizziness; Dribbling of urine; Hematuria; HFrEF (heart failure with reduced ejection fraction) ([address] History of blood transfusion; [place] (hard of hearing); Hypertension; Invasive carcinoma of urinary bladder [address] Ischemic cardiomyopathy; CHRISTINE treated with BiPAP; Paroxysmal atrial fibrillation ([address] and Ventricular tachycardia ([address] has a past surgical history that includes Dilatation, esophagus (); Cholecystectomy (); Tonsillectomy; Colonoscopy; ECHO Compl W Dop Color Flow (2013); Cystocopy (x 2); TURP (16); Cystocopy (2016); Ureter stent placement (Left, 2016); Cystocopy (Left, 2016); Coronary artery bypass graft (); ablation of dysrhythmic focus (2017); Cystocopy (Left, 2017); eye surgery; Cystoscopy (07/10/2017); Cystocopy (Left, 2017); Cardiac defibrillator placement (Left,  ); Cardiac catheterization (2017); pr cystourethroscopy,ureter catheter (Left, 2018); pr cystourethroscopy,ureter catheter (Left, 2018); Small intestine surgery (); and Cardiac defibrillator placement (2014). Room #: 521  DIAGNOSIS: DANA  PRECAUTIONS: Falls, safety, chair alarm  [facility] Score:     Nurse ok with Rx, Pt is agreeable to treatment. Pain level is: no complaints  Balance: poor dynamic using [employer] for support     Functional Mobility  Bed Mobility  Rolling: Max A of 2  Supine to sit: Max A of 2  Sit to supine: NT  Scooting:  Max A

## 2018-11-27 NOTE — PROGRESS NOTES
Ok to transfer to Med-Surg if ok with [facility], send message to [facility], awaiting response. Electronically signed by Charlee Huddleston RN on 11/27/2018 at 10:26 AM     Blanco Gaitan called from [facility], ok to transfer to [facility], see order.   Electronically signed by Charlee Huddleston RN on 11/27/2018 at 10:33 AM

## 2018-11-27 NOTE — PLAN OF CARE
Problem: Falls - Risk of:  Goal: Will remain free from falls  Will remain free from falls   Outcome: Met This Shift      Problem: Skin Integrity:  Goal: Will show no infection signs and symptoms  Will show no infection signs and symptoms   Outcome: Met This Shift      Problem: Pain:  Goal: Pain level will decrease  Pain level will decrease   Outcome: Met This Shift

## 2018-11-27 NOTE — PROGRESS NOTES
Subjective:    Awake and alert. Feels better overnight. Denies chest pain or dyspnea. Denies abdominal pain. Tolerating diet. No nausea or vomiting. Objective:    BP (!) 105/59   Pulse 78   Temp 98.3 °F (36.8 °C) (Oral)   Resp 16   Ht 6' 1\" (1.854 m)   Wt 199 lb 6.4 oz (90.4 kg)   SpO2 94%   BMI 26.31 kg/m²   Skin: Warm and dry  Neck: Supple, no JVD  Heart:  Irregularly irregular, no murmurs, gallops, or rubs. Lungs:  Diminished bilaterally, no wheeze, rales or rhonchi  Abd: bowel sounds present, nontender, nondistended, no masses  Extrem:  No clubbing, cyanosis, or edema, pulses intact    I/O last 3 completed shifts:  In: -   Out: 100 [Urine:100]    Results      Component Value Units   POCT Glucose [[id number]] (Abnormal) Collected: 11/27/18 1209   Updated: 11/27/18 1212     Meter Glucose 119 (H) mg/dL   POCT Glucose [[id number]] (Abnormal) Collected: 11/27/18 0909   Updated: 11/27/18 0915     Meter Glucose 172 (H) mg/dL   Basic Metabolic Panel [[id number]] (Abnormal) Collected: 11/27/18 0526   Updated: 11/27/18 0616    Specimen Source: Blood     Sodium 142 mmol/L    Potassium 4.0 mmol/L    Chloride 103 mmol/L    CO2 28 mmol/L    Anion Gap 11 mmol/L    Glucose 141 (H) mg/dL    BUN 30 (H) mg/dL    CREATININE 1.5 (H) mg/dL    GFR Non-African American 45 mL/min/1.73    Comment: Chronic Kidney Disease: less than 60 ml/min/1.73 sq. m.         Kidney Failure: less than 15 ml/min/1.73 sq.m. Results valid for patients 18 years and older.         GFR African American 55    Calcium 9.0 mg/dL   Protime-INR [[id number]] (Abnormal) Collected: 11/27/18 0526   Updated: 11/27/18 0608    Specimen Type: Blood    Specimen Source: Blood     Protime 26.9 (H) sec    INR 2.4   POCT Glucose [[id number]] (Abnormal) Collected: 11/26/18 2148   Updated: 11/26/18 2155     Meter Glucose 166 (H) mg/dL   POCT Glucose [[id number]] (Abnormal) Collected: 11/26/18 1620   Updated: 11/26/18 1639     Meter Glucose 155 (H) mg/dL

## 2018-11-28 NOTE — PROGRESS NOTES
On BB, Nitrates+Hydralazine; No ACE/ARB due to CKD      Coronary artery disease :s/p CABG - Cath 8/2017;  - Stable, Continue BB; No ASA due to Coumadin. No statins due to intolerance      Cardiac resynchronization therapy defibrillator (CRT-D) in place - Follows with Dr Nathalie Carlson      Permanent atrial fibrillation ([facility]  - On Coumadin, Stable, INR 2.0      CKD - Monitor renal Fn      Anemia - Monitor H/H      Type 2 diabetes mellitus with stage 3 chronic kidney disease, with long-term current use of insulin (HCC)      Invasive carcinoma of urinary bladder (HCC)      HTN (hypertension), benign  - Stable        ?  Home today  F/u by Dr Jodee Hamman 1 week      Electronically signed by Von Hammond MD on 11/28/2018 at [address]

## 2018-11-28 NOTE — DISCHARGE SUMMARY
Physician Discharge Summary     Patient ID:  Jordan Avila  [id number]  27 y.o.  1940    Admit date: 11/23/2018    Discharge date and time: 11/28/2018  5:20 PM     Admission Diagnoses: Acute kidney injury ([facility] [N17.9]  Acute kidney injury [facility]) [N17.9]    Discharge Diagnoses:          Coronary artery disease involving native coronary artery of native heart without angina pectoris    Cardiac resynchronization therapy defibrillator (CRT-D) in place    Hyperlipidemia with target LDL less than 100    Permanent atrial fibrillation (HCC)    Type 2 diabetes mellitus with stage 3 chronic kidney disease, with long-term current use of insulin (HCC)    CKD (chronic kidney disease) stage 3, GFR 30-59 ml/min (HCC)    Invasive carcinoma of urinary bladder (HCC)    Chronic systolic CHF (congestive heart failure) (HCC)    Wide-complex tachycardia (HCC)    Benign prostatic hyperplasia    Persistent atrial fibrillation (HCC)    HTN (hypertension), benign    Acute cystitis with hematuria due to Candida     Acute kidney injury ([facility]    Pure hypercholesterolemia   Acute on Chronic Systolic and Diastolic CHF      Consults: cardiology and urology    Procedures: none    Laboratory:   Last 3 BMP  Recent Labs      11/26/18 0228 11/27/18 0526 11/28/18   0610   NA  138  142  143   K  4.1  4.0  3.7   CL  103  103  105   CO2  24  28  27   BUN  32*  30*  28*   CREATININE  1.5*  1.5*  1.4*   GLUCOSE  68*  141*  83   CALCIUM  8.8  9.0  9.3       Last 3 CBC:  Recent Labs      11/26/18 0228   WBC  13.8*   RBC  3.24*   HGB  8.7*   HCT  28.6*   MCV  88.3   MCH  26.9   MCHC  30.4*   RDW  18.6*   PLT  301   MPV  12.9*       Recent Labs      11/26/18 0228 11/27/18 0526  11/28/18   0610   PROTIME  23.3*  26.9*  32.1*   INR  2.0  2.4  2.8       Hospital Course:        The patient is a 69 y.o. male patient of Dr Ileana Dow who presents with weakness lethargy and cloudy urine. Also complained shortness of breath.  In the tablet Take 325 mg by mouth Daily with lunch       mineral oil-hydrophilic petrolatum (AQUAPHOR) ointment Apply topically as needed. mirtazapine (REMERON) 30 MG tablet Take 30 mg by mouth nightly      mexiletine (MEXITIL) 250 MG capsule TAKE 1 CAPSULE THREE TIMES DAILY  Qty: 270 capsule, Refills: 3      vitamin D (CHOLECALCIFEROL) 1000 UNIT TABS tablet Take 1,000 Units by mouth Daily with lunch       metoprolol succinate (TOPROL XL) 200 MG extended release tablet Take 1 tablet by mouth 2 times daily  Qty: 60 tablet, Refills: 3      potassium chloride (KLOR-CON M) 20 MEQ extended release tablet Take 20 mEq by mouth 2 times daily      allopurinol (ZYLOPRIM) 300 MG tablet Take 300 mg by mouth Daily with lunch       azelastine (ASTELIN) 0.1 % nasal spray 1 spray by Nasal route 2 times daily as needed for Rhinitis       isosorbide mononitrate (IMDUR) 60 MG CR tablet Take 60 mg by mouth 2 times daily       OXYGEN Inhale 3 L/min into the lungs as needed       hydrALAZINE (APRESOLINE) 25 MG tablet Take 1 tablet by mouth every 8 hours. Qty: 90 tablet, Refills: 0       !! - Potential duplicate medications found. Please discuss with provider. STOP taking these medications       insulin aspart (NOVOLOG FLEXPEN) 100 UNIT/ML injection pen Comments:   Reason for Stopping:         metolazone (ZAROXOLYN) 2.5 MG tablet Comments:   Reason for Stopping:             Activity: activity as tolerated  Diet: cardiac diet    Follow-up with Dr Cass Springer in 1 week. Note that over 30 minutes was spent in preparing discharge papers, discussing discharge with patient, medication review, etc.    Signed:  ELSY Blanco  11/28/2018  1:13 PM

## 2018-11-28 NOTE — PROGRESS NOTES
toilet transfer pt completed transfer with arm in arm assistance. Therapeutic Exercises:  Pt demoed fair use of B UE during transfers and functional mobility. Other:  Pt appears anxious requiring extended time to perform transfers. Pt attempts to initiate transfer and then yells \"I can't do it\" and refuses. With vc for encouragement pt was agreeable to transfers. Transferred pt to arm chair with alarm on after session. Education:  Safe transfer techniques    Equipment Recommendations:  Continue to assess    AM-PAC Inpatient Daily Activity Raw Score:  14/24    Pain Level: No complaints of pain   Additional Notes:  Poor safety awareness. Patient has made good progress during treatment sessions toward set goals. [x] Continue with current OT Plan of care.   [] Prepare for Discharge    Juany RAYMUNDO/L [id number]    Total Tx Time: 23

## 2018-11-28 NOTE — PROGRESS NOTES
D/C iv per policy and procedure, patient tolerated well. Patient stated he has had his flu and pneumonia vaccine.

## 2018-11-28 NOTE — CARE COORDINATION
CM met with pt the past few days in room as he frequently calls out when wife not here at his side. IMM update explained but refused to sign. Copy in room. Accepted by [facility] when ready for discharge.

## 2018-11-28 NOTE — CARE COORDINATION
Social work / Discharge Planning:       Patient will discharge to [facility] at [address] via the facility transport van. Social work updated RN and patient's wife Efraín Carroll [phone].   Electronically signed by SAEID Gatica on 11/28/2018 at 1:49 PM

## 2018-12-05 PROBLEM — A41.9 SEPTIC SHOCK (HCC): Status: ACTIVE | Noted: 2018-01-01

## 2018-12-05 PROBLEM — R65.21 SEPTIC SHOCK (HCC): Status: ACTIVE | Noted: 2018-01-01

## 2018-12-05 PROBLEM — D68.32 WARFARIN-INDUCED COAGULOPATHY (HCC): Status: RESOLVED | Noted: 2018-01-01 | Resolved: 2018-01-01

## 2018-12-05 PROBLEM — D68.32 WARFARIN-INDUCED COAGULOPATHY (HCC): Status: ACTIVE | Noted: 2018-01-01

## 2018-12-05 PROBLEM — T45.515A WARFARIN-INDUCED COAGULOPATHY (HCC): Status: ACTIVE | Noted: 2018-01-01

## 2018-12-05 PROBLEM — T45.515A WARFARIN-INDUCED COAGULOPATHY (HCC): Status: RESOLVED | Noted: 2018-01-01 | Resolved: 2018-01-01

## 2018-12-05 PROBLEM — J18.9 PNEUMONIA: Status: ACTIVE | Noted: 2018-01-01

## 2018-12-05 NOTE — PROGRESS NOTES
INPATIENT FOLLOW-UP    Date of Service: 12/5/2018    Chief complaint: Follow-up for chronic systolic/diastolic CHF, ICM, CAD s/p CABG, atrial fibrillation    Interim History:  Now off pressors / he remains on IV fluids. Paced rhythm on telemetry. Currently on BiPAP -- weak appearing. No chest pain.     Review of systems:  Unable to adequately assess (currently on BiPAP, weak-appearing)    Patient Active Problem List   Diagnosis    Coronary artery disease involving native coronary artery of native heart without angina pectoris    Cardiac resynchronization therapy defibrillator (CRT-D) in place    Hyperlipidemia with target LDL less than 100    Permanent atrial fibrillation (HCC)    Type 2 diabetes mellitus with stage 3 chronic kidney disease, with long-term current use of insulin (HCC)    CKD (chronic kidney disease) stage 3, GFR 30-59 ml/min (HCC)    Invasive carcinoma of urinary bladder (HCC)    Chronic systolic CHF (congestive heart failure) (HCC)    Acute hypoxemic respiratory failure (HCC)    Wide-complex tachycardia (HCC)    Benign prostatic hyperplasia    HTN (hypertension), benign    DANA (acute kidney injury) ([facility] [address]    Septic shock (HCC)    Warfarin-induced coagulopathy (HCC)       Allergies   Allergen Reactions    Multaq [Dronedarone] Shortness Of Breath    Amiodarone Other (See Comments)     Difficulty breathing    Bactrim [Sulfamethoxazole-Trimethoprim] Itching    Iv Dye [Iodides] Hives and Itching    Pollen Extract Other (See Comments)     Seasonal allergies    Tikosyn [Dofetilide] Diarrhea and Nausea And Vomiting    Doxycycline Rash       Current Facility-Administered Medications   Medication Dose Route Frequency Provider Last Rate Last Dose    norepinephrine (LEVOPHED) 16 mg in dextrose 5 % 250 mL infusion  5 mcg/min Intravenous Continuous Uriel Hale DO 1.9 mL/hr at 12/05/18 1000 2 mcg/min at 12/05/18 1000    0.9 % sodium chloride bolus  750 mL Intravenous Once Uriel Hale estimated at 25-30%.   Severe global hypokinesis   Stage III diastolic dysfunction   The left atrium is mild-moderately dilated.   Increased E point septal separation noted,suggesting decreased LV cardiac output   Mild to moderate mitral regurgitation with central jet   The aortic valve appears mildly sclerotic.   Pulmonary hypertension is mild. Amber Hdz nuclear stress test: 9/16/16  1. No evidence of pharmacologic stress-induced myocardial ischemia. The findings are most consistent with a nonischemic cardiomyopathy.       2. Severe global hypokinesis.       3. Severe left ventriculomegaly. The left ventricular ejection   fraction is 24%. Limited Echocardiogram: 8/26/2017  Left Ventricle   Severely dilated left ventricular chamber. Severe left ventricular   systolic dysfunction. Global hypokinesis with regional wall motion   variation. LVEF is 15%.      Right Ventricle   Normal right ventricular chamber size. Normal right ventricular function.  ICD noted in right ventricle.      Left Atrium   Mildly enlarged left atrium based on visual estimates.      Right Atrium   Normal right atrium. ICD or pacer lead noted.      Mitral Valve   Mild mitral valve leaflet thickening. Normal mitral leaflet mobility.   Moderate mitral annular calcification.      Conclusions      Summary   Limited transthoracic echocardiogram.   Severely dilated left ventricle.      Severe left ventricular systolic dysfunction.   Global hypokinesis with regional wall motion variation. LVEF is 15%.   Moderate mitral annular calcification.   Normal right ventricular function.    ICD noted in right ventricle.      When compared with prior studies including 9/2016 and 7/2013, there has   been a decrease in left ventricular systolic function.      Signature      ----------------------------------------------------------------   Electronically signed by Jose BrittInterpreting   physician) on 08/26/2017 07:05

## 2018-12-05 NOTE — PATIENT CARE CONFERENCE
Intensive Care Daily Quality Rounding Checklist        ICU Team Members: Dr. Hung Cooper, Harley Agrawal, & Ruth (residents), clinical pharmacist, respiratory therapist, charge nurse, bedside nurse     ICU Day #: NUMBER: 1     Intubation Date: N/A     Ventilator Day #: N/A     Central Line Insertion Date: December 5                                                    Day #: 1      Arterial Line Insertion Date: N/A                             Day #: N/A     DVT Prophylaxis: INR elevated (on Coumadin)    GI Prophylaxis: Protonix     Skin Issues/ Wounds and ordered treatment discussed on rounds: no issues     Goals/ Plans for the Day: Daily labs, check cultures, start IVF, consult ID, Cardiology, Nephrology, Palliative, Hospice, wean Levophed as able

## 2018-12-05 NOTE — CONSULTS
or double vision, denies hearing problem  RESPIRATORY: denies cough, shortness of breath, sputum expectoration, chest pain. CARDIOVASCULAR:  Denies palpitation  GASTROINTESTINAL:  Denies abdominal pain, diarrhea or constipation. GENITOURINARY:  Denies burning urination or frequency of urination  INTEGUMENT: denies wound, rash  HEMATOLOGIC/LYMPHATIC:  Denies lymph node swelling, gum bleeding or easy bruising. MUSCULOSKELETAL:  Denies leg pain, joint pain, joint swelling  NEUROLOGICAL:  Denies light headed, dizziness, loss of consciousness, weakness    Symptom Assessment:    Symptom Present Y/N Medications Number Doses in Past 24 hrs   Pain      Nausea/vomiting      Constipation  Date of Last BM: none documented      Appetite      SOB      Insomnia      Pruritus      Anxiety/agitation      Performance Status        PHYSICAL EXAM:   Vitals:    BP (!) 108/55   Pulse 75   Temp 98.6 °F (37 °C) (Axillary)   Resp 26   Ht 5' 9\" (1.753 m)   Wt 201 lb 5 oz (91.3 kg)   SpO2 100%   BMI 29.73 kg/m²     Gen: bipap on opens eyes . Says he's not comfortable but denies pain and sob. HEENT: normocephalic, atraumatic, PERRLA, EOMI, MMM,   Neck: no LAD, no JVD,   Lungs: fairly diminished bs bilaterally   Heart: regular rate and rhythm,, no murmurs/rubs/gallops appreciated   Abdomen: normoactive bowel sounds, soft, non-tender, non-distended   Extremities: chronic venous stasis changes noted on lower extremities.    Skin: warm, dry   Neuro: awake at times , falls asleep easily    DATA:        UA:  Lab Results   Component Value Date    COLORU BLOODY 12/05/2018    PHUR 6.5 12/05/2018    WBCUA >20 12/05/2018    RBCUA PACKED 12/05/2018    YEAST RARE 11/23/2018    BACTERIA FEW 12/05/2018    CLARITYU CLOUDY 12/05/2018    SPECGRAV 1.025 12/05/2018    LEUKOCYTESUR LARGE 12/05/2018    UROBILINOGEN 0.2 12/05/2018    BILIRUBINUR Negative 12/05/2018    BLOODU LARGE 12/05/2018    GLUCOSEU Negative 12/05/2018       Cultures:  No results for eligible for Hospice services if they desire. Thank you for allowing us to participate in the care of this patient.     Electronically signed by Hilario Adames MD on 12/5/2018 at 11:23 AM

## 2018-12-05 NOTE — DISCHARGE INSTR - COC
Continuity of Care Form    Patient Name: Zabrina Simmons   :  1940  MRN:  [id number]    Admit date:  2018  Discharge date:  18  Code Status Order: Limited   Advance Directives:   [facility] Documentation     Date/Time Healthcare Directive Type of Healthcare Directive Copy in [address] Agent's Name Healthcare Agent's Phone Number    [phone]  Other (Comment)   wife  Living will  Yes, copy in chart -- -- --          Admitting Physician:  Toshia Yuan MD  PCP: Michelle Herrera MD    Discharging Nurse: [facility] Unit/Room#: [address] Unit Phone Number: [phone]    Emergency Contact:   Extended Emergency Contact Information  Primary Emergency Contact: Venessa Stroud  Address: [address], [address] [place] [address] Phone: [phone]  Mobile Phone: [phone]  Relation: Spouse  Secondary Emergency Contact: Addy Roth [address] Phone: [phone]  Relation: Child    Past Surgical History:  Past Surgical History:   Procedure Laterality Date    ABLATION OF DYSRHYTHMIC FOCUS  2017    dr Sandee Moran  2017    Dr. Gladys Tucker- Bypass grafts patent   [address] Left      dr Isi Martinez   [address]  2014    Upgrade to BiV/ICD   ([employer])  Dr. Carmen Mohan  2007    COLONOSCOPY     [address] GRAFT  2005    cabg dr David Cruz  x 2    CYSTOSCOPY  2016    with clot evacuation    CYSTOSCOPY Left 2016    Stent Exchange    CYSTOSCOPY Left 2017    cysto L stent change digital rectal exam     CYSTOSCOPY  07/10/2017    retrogrades,peylogram, left stent exchange    CYSTOSCOPY Left 2017    retrotgrade  left stent    DILATATION, ESOPHAGUS  2006    ECHO COMPL W DOP COLOR FLOW  2013 patient):  None    RN SIGNATURE:  Electronically signed by Robin Rodriguez RN on 12/11/18 at 2:08 PM    CASE MANAGEMENT/SOCIAL WORK SECTION    Inpatient Status Date: ***    Readmission Risk Assessment Score:  Readmission Risk              Risk of Unplanned Readmission:        50           Discharging to Facility/ Agency   · Name:   · Address:  · Phone:  · Fax:    Dialysis Facility (if applicable)   · Name:  · Address:  · Dialysis Schedule:  · Phone:  · Fax:    / signature: {Esignature:[id number]}    PHYSICIAN SECTION    Prognosis: {Prognosis:7777205747}    Condition at Discharge: [facility] Patient Condition:700021970}    Rehab Potential (if transferring to Rehab): {Prognosis:1078009240}    Recommended Labs or Other Treatments After Discharge: ***    Physician Certification: I certify the above information and transfer of Zabrina Simmons  is necessary for the continuing treatment of the diagnosis listed and that he requires {Admit to Appropriate Level of Care:88405} for {GREATER/LESS:194857855} 30 days.      Update Admission H&P: {CHP DME Changes in QKWYB:416194100}    PHYSICIAN SIGNATURE: Electronically signed by Maryam Rivas MD on 12/11/2018 at 1:58 PM

## 2018-12-05 NOTE — ED PROVIDER NOTES
T Axis 43 degrees       RADIOLOGY:  XR CHEST PORTABLE   Final Result   Tortuous ectatic aorta   Cardiomegaly    Airspace disease compatible with atelectasis or pneumonia   at the left lung base   There is a suggestion of a left pleural effusion   The chest is improved in the interval                    ------------------------- NURSING NOTES AND VITALS REVIEWED ---------------------------  Date / Time Roomed:  12/5/2018  1:48 AM  ED Bed Assignment:  20/20    The nursing notes within the ED encounter and vital signs as below have been reviewed. Patient Vitals for the past 24 hrs:   BP Temp Temp src Pulse Resp SpO2 Weight   12/05/18 0644 113/61 - - 74 22 100 % -   12/05/18 0608 (!) 95/50 - - 74 22 100 % -   12/05/18 0555 (!) 104/57 - - 74 23 100 % -   12/05/18 0518 - - - - - - 201 lb 5 oz (91.3 kg)   12/05/18 0430 (!) 86/49 - - 76 28 96 % -   12/05/18 0412 (!) 92/57 - - 75 - 100 % -   12/05/18 0400 (!) 92/51 - - 75 - 100 % -   12/05/18 0342 (!) 90/46 - - 75 (!) 32 100 % -   12/05/18 0316 (!) 85/41 - - 79 (!) 34 100 % -   12/05/18 0221 (!) 119/52 - - 75 (!) 36 100 % -   12/05/18 0205 - - - - (!) 35 - -   12/05/18 0200 (!) 72/57 99.8 °F (37.7 °C) Oral 74 28 (!) 89 % -       Oxygen Saturation Interpretation: Normal    ------------------------------------------ PROGRESS NOTES ------------------------------------------  Re-evaluation(s):  Time: 0504  Patients symptoms are improving  Repeat physical examination is significantly improved    Counseling:  I have spoken with the patient and discussed todays results, in addition to providing specific details for the plan of care and counseling regarding the diagnosis and prognosis. Their questions are answered at this time and they are agreeable with the plan of admission.    --------------------------------- ADDITIONAL PROVIDER NOTES ---------------------------------  Consultations:  Time: 5664. Spoke with Dr. Shonan Jones. Discussed case.   They will admit the

## 2018-12-05 NOTE — PROGRESS NOTES
piperacillin-tazobactam  3.375 g Intravenous Q8H    insulin lispro  0-12 Units Subcutaneous TID WC    insulin lispro  0-6 Units Subcutaneous Nightly    pantoprazole  40 mg Intravenous Daily    And    sodium chloride (PF)  10 mL Intravenous Daily       Diet:   DIET CARB CONTROL;     EXAM:  General: No distress. On bipap. Eyes: PERRL. No sclera icterus. No conjunctival injection. ENT: No discharge. Pharynx clear. Neck: Trachea midline. Normal thyroid. Resp: No accessory muscle use. No rales. No wheezing. No rhonchi. CV: Regular rate. Regular rhythm. No murmur or rub. Abd: Non-tender. Non-distended. No masses. No organomegaly. Normal bowel sounds. Obese. Skin: Warm and dry. No nodule on exposed extremities. No rash on exposed extremities. Ext: No cyanosis, clubbing, edema  Lymph: No cervical LAD. No supraclavicular LAD. M/S: No cyanosis. No joint deformity. No clubbing. Neuro: Positive pupils/gag/corneals. Normal pain response. Results:  CBC: Recent Labs      12/05/18 0209   WBC  29.3*   HGB  10.7*   HCT  37.5   MCV  91.7   PLT  373     BMP: Recent Labs      12/05/18 0209 12/05/18   0603  12/05/18   1600   NA  142  141  141   K  6.0*  4.7  4.7   CL  97*  102  105   CO2  30*  29  28   PHOS   --   3.0   --    BUN  54*  55*  48*   CREATININE  2.1*  2.2*  1.8*     LIVER PROFILE:   Recent Labs      12/05/18 0209   AST  20   ALT  23   BILITOT  0.3   ALKPHOS  88     PT/INR:   Recent Labs      12/05/18 0209 12/05/18   1315   PROTIME  90.5*  21.4*   INR  8.1*  1.9     APTT: No results for input(s): APTT in the last 72 hours. Pathology:  1. N/A      Microbiology:  1. None    Recent ABG:   Recent Labs      12/05/18 0311   PH  7.459*   PO2  283.0*   PCO2  35.9   HCO3  24.9   BE  1.2   O2SAT  99.7*   METHB  0.3   O2HB  98.8*   COHB  0.6   O2CON  14.8   HHB  0.3   THB  10.1*     FiO2 : 40 %       Recent Films:  US RETROPERITONEAL COMPLETE   Final Result   Moderate right hydronephrosis.

## 2018-12-05 NOTE — H&P
DAILY)  vitamin D (CHOLECALCIFEROL) 1000 UNIT TABS tablet, Take 1,000 Units by mouth Daily with lunch   metoprolol succinate (TOPROL XL) 200 MG extended release tablet, Take 1 tablet by mouth 2 times daily  potassium chloride (KLOR-CON M) 20 MEQ extended release tablet, Take 20 mEq by mouth 2 times daily  allopurinol (ZYLOPRIM) 300 MG tablet, Take 300 mg by mouth Daily with lunch   azelastine (ASTELIN) 0.1 % nasal spray, 1 spray by Nasal route 2 times daily as needed for Rhinitis   isosorbide mononitrate (IMDUR) 60 MG CR tablet, Take 60 mg by mouth 2 times daily   OXYGEN, Inhale 3 L/min into the lungs as needed   hydrALAZINE (APRESOLINE) 25 MG tablet, Take 1 tablet by mouth every 8 hours. insulin glargine (LANTUS SOLOSTAR) 100 UNIT/ML injection pen, Inject 25 Units into the skin every morning  insulin glargine (LANTUS SOLOSTAR) 100 UNIT/ML injection pen, Inject 20 Units into the skin nightly  CPAP Machine MISC, by Does not apply route nightly  mineral oil-hydrophilic petrolatum (AQUAPHOR) ointment, Apply topically as needed. (Patient taking differently: Apply topically daily )    Note that the patient's home medications were reviewed and the above list is accurate to the best of my knowledge at the time of the exam.    Allergies:    Multaq [dronedarone]; Amiodarone; Bactrim [sulfamethoxazole-trimethoprim]; Iv dye [iodides]; Pollen extract; Tikosyn [dofetilide]; and Doxycycline    Social History:    reports that he quit smoking about 42 years ago. His smoking use included Cigarettes. He has a 37.50 pack-year smoking history. He has never used smokeless tobacco. He reports that he does not drink alcohol or use drugs. Family History:   family history includes Alzheimer's Disease in his mother; Diabetes in his father; Heart Disease in his father.     REVIEW OF SYSTEMS:  As above in the HPI, otherwise negative    PHYSICAL EXAM:    Vitals:  /61   Pulse 74   Temp 99.8 °F (37.7 °C) (Oral)   Resp 24   Ht 5' 9\" 12/05/2018       ASSESSMENT:      Patient Active Problem List   Diagnosis    Sepsis with septic shock    Possible lobar pneumonia    Acute hypoxemic respiratory failure    Coronary artery disease involving native coronary artery     Cardiac resynchronization therapy defibrillator (CRT-D) in place    Hyperlipidemia, mixed    Permanent atrial fibrillation-currently paced, INR supra therapeutic    Type 2 diabetes mellitus uncontrolled    CKD (chronic kidney disease) stage 3 with superimposed DANA    Invasive carcinoma of urinary bladder    Chronic systolic CHF (congestive heart failure)     Warfarin-induced coagulopathy       PLAN:    1) admit to ICU  2) empiric IV antibiotics  3) check cultures  4) critical care, ID, and nephrology consults  5) continue pressors for BP support. Defer IV fluids to nephrology given history of significant cardiomyopathy/systolic CHF.   6) hold coumadin due to elevated INR  7) BiPAP for ventilatory support  8) follow labs  9) palliative care consult to assist in discussing further patient care given age and co morbidities  10) condition critical, prognosis uncertain  11) the patient is expected to stay 2 or more midnights to evaluate and treat his pneumonia/sepsis      Please note that over 70 minutes was spent in evaluating the patient, review of records and results, discussion with staff/family, etc.    Kimberlee Acosta MD  7:54 AM  12/5/2018

## 2018-12-05 NOTE — CONSULTS
PT/INR:    Lab Results   Component Value Date    PROTIME 90.5 12/05/2018    INR 8.1 12/05/2018     PTT:    Lab Results   Component Value Date    APTT 30.5 11/14/2018       Comprehensive Metabolic Profile:   Recent Labs      12/05/18   0209  12/05/18   0603   NA  142  141   K  6.0*  4.7   CL  97*  102   CO2  30*  29   BUN  54*  55*   CREATININE  2.1*  2.2*   GLUCOSE  175*  182*   CALCIUM  9.6  9.0   PROT  7.2   --    LABALBU  3.3*   --    BILITOT  0.3   --    ALKPHOS  88   --    AST  20   --    ALT  23   --       Magnesium:   Lab Results   Component Value Date    MG 1.9 11/24/2018     Phosphorus:   Lab Results   Component Value Date    PHOS 3.0 12/05/2018     Ionized Calcium: No results found for: CAION     Urinalysis:     Troponin:   Recent Labs      12/05/18   0209   TROPONINI  0.03       Microbiology past 24h:    Blood cultures:                 [] None drawn [x] Pending      [] Negative             []  Positive (Details:  )  Urine Culture:                   [] None drawn [x] Pending      [] Negative             []  Positive (Details:  )  Sputum Culture:               [] None drawn [] Pending       [] Negative             []  Positive (Details:  )   Endotracheal aspirate:     [] None drawn [] Pending       [] Negative             []  Positive (Details:  )     Other Pertinent Labs and Pathology Results:   1. Radiology/Imaging:     CXR :  12/5/18  Impression   Tortuous ectatic aorta   Cardiomegaly    Airspace disease compatible with atelectasis or pneumonia   at the left lung base   There is a suggestion of a left pleural effusion   The chest is improved in the interval     CT:  None. Other:  Bedside ultrasound 12/5/18    BEDSIDE LUNG ULTRASOUND    Risks, benefits and alternatives (for applicable procedures below) described. Performed By: Chepe Denton DO. Indication: SOB, hypoxia, ?effusion  Informed consent: by patient or legal gardian.   Procedural Quadrants/Interpretations:     Right Lung:   Effusion was not identified   B lines were not seen   Lung sliding was seen in all lung fields  Left Lung:   Effusion was identified - small   B lines were not seen   Lung sliding was seen in all lung fields    This procedure was performed by Esperanza Carlson on 12/5/18 at 2:22 PM                              Assessment:     Principal Problem:    Septic shock ([facility] [address]  Active Problems:    Coronary artery disease involving native coronary artery of native heart without angina pectoris    Cardiac resynchronization therapy defibrillator (CRT-D) in place    Hyperlipidemia with target LDL less than 100    Permanent atrial fibrillation (HCC)    Type 2 diabetes mellitus with stage 3 chronic kidney disease, with long-term current use of insulin (HCC)    CKD (chronic kidney disease) stage 3, GFR 30-59 ml/min ([facility])    Invasive carcinoma of urinary bladder (HCC)    Chronic systolic CHF (congestive heart failure) ([facility])    Acute hypoxemic respiratory failure ([facility])    DANA (acute kidney injury) ([facility] [address]    Warfarin-induced coagulopathy ([facility] [address]  Resolved Problems:    Ischemic cardiomyopathy      Additional assessment:    1. Plan:     Wean per protocol:  [] No   [] Yes  [x] N/A    ICU Prophylaxis:  Stress ulcer:  [x] PPI Agent  [] H6Ippde [] Sucralfate  [] Other:  VTE:   [] Enoxaparin  [] Unfract. Heparin Subcut  [x] EPC Cuffs    Nutrition:  [] NPO [] Tube Feeding (Specify: ) [] TPN  [x] PO (Diet: DIET CARB CONTROL;)    Home medications reconciled: [] No  [x] Yes    Insulin drip:   [x] No   [] Yes    Family updated:    [] No   [x] Yes    Transfer Candidate?:   [x] No   [] Yes    SYSTEMS ASSESSMENT    Neuro   Awake, pleasantly confused at baseline    Respiratory   BIPAP as needed. Wean oxygen as tolerated. Keep O2 sat 90-92%. Will attempt nasal cannula while awake    Cardiovascular   Continue levophed as needed to keep MAP >65  Patient well known to [facility] cardiology.  Will place consult to them    Gastrointestinal   Diet

## 2018-12-06 NOTE — PROGRESS NOTES
Occupational Therapy  OCCUPATIONAL THERAPY INITIAL EVALUATION      Date:2018  Patient Name: Jalyn Clayton  MRN: [id number]  : 1940  Room: [address] [place], [place] [address]A     Evaluating OT: Lesli Sellers OTR/L       Recommended Adaptive Equipment: TBA     AM-PAC Daily Activity Raw Score:   G Code:  CL    Diagnosis: Septic Shock  Presented to the ED from nursing home by ambulance with reports of fever, cough, and hypoxia  Past Medical History: CHF,   Precautions: Falls, [place]      Home Living/PLOF:   Per chart;   Patient admitted from [facility] (PLOF ??? )     From medical chart. ,  pt was discharged from hospitalization in 2018 to a SNF. Patient lives with wife in a 2 story home with 1st floor set up with 2 steps HR on entry. Pt's bathroom has walk in shower with grab bars and seat. Pt was Mod I with ADLs except assist from wife for LB dressing prior to 2018. Pt performs functional mobility with FWW. Pt sleeps in lift chair. Wife provides all IADLs. Pain Level: pt c/o no pain  this session     Hearing: [place]   Vision: [facility]      Cognition: oriented, alert and following commands    UE Assessment:  Hand Dominance: Right []  Left []    UE AROM and strength WFLs      Functional Assessment:   Initial Eval Status 18  Comments   Feeding  SBA/set-up     Grooming  Mod A, seated     Upper Body Dressing Mod A      Lower Body Dressing Max A     Bathing     Toileting      Bed Mobility  Supine to Sit: max A x2  Sit to Supine:max A x2    Functional Transfers Max A x2   Sit-stand from bed   Patient fearful of falling    Functional Mobility Returned to bed       Sit balance: CGA   Stand balance: Max A x2  Endurance/Activity tolerance: Fair   Sensation:                                 Comments: Upon arrival pt lying in bed . At end of session pt returned to bed  with all devices within reach, all lines and tubes intact. Call light within reach.   Bed/chair alarm ON        Assessment of current deficits    Functional

## 2018-12-06 NOTE — PROGRESS NOTES
failure)     Warfarin-induced coagulopathy-resolved         Plan:    1) continue antibiotics per ID  2) await final cultures  3) follow labs  4) IV fluids as per nephrology  5) resume coumadin and dose to maintain INR 2-3  6) PT/OT evaluations  7) ok to transfer out of ICU once ok with critical care      Felicia Ortez MD  8:40 AM  12/6/2018

## 2018-12-06 NOTE — PROGRESS NOTES
Palliative Care Department  Palliative Care Progress Note  Provider: [facility] POP days prior to Consult: Hospital Day: 2    Referring Provider:  Dr. Soren Jensen    Reason for Consult:  [x]  Code status Discussion  [x]  Assist with goals of care  []  Psychosocial support  []  Symptom Management      Chief Complaint: fever, cough, hypoxia    Subjective:   12/6/18  Patient wakes easily. He is currently on BiPAP. There is no family at bedside. He reports feeling short of breath still. He denies pain or stomach upset. Discussion with RN reports he is has some confusion throughout the day and does want some meter, sit with him and has been yelling out. Noted that hospice was consult it and was supposed to see the patient's family today. They have not arrived. 12/5/18  HPI:  Alana Monreal is a 69 y.o. male with significant past medical history of CHF, bladder cancer, DM, HTN, paroxysmal A fib who presented to the Emergency department from a nursing facility with fever, cough, hypoxia. He was admitted to the hospital with acute respiratory failure with hypoxia, acute congestive heart failure, acute kidney injury, septic shock, hyperkalemia and gross hematuria. He was admitted to the critical care unit. The patient was reportedly 78% on 4 L oxygen at  facility. He was given 2 DuoNeb treatments en route and his O2 saturation increased to greater than 90% on 15 L via nonrebreather mask. Pt was recently from hospital to rehab at end of November.  Pt was hypoxic and hypotensive and was placed on bipap.   Imaging in the ER demonstrated possible pneumonia.  He met criteria for SIRS/sepsis.  He was given IV pressors and antibiotics and admitted to the ICU and currently on bipap.  Review of EMR notes that he was just admitted here 10 days ago for treatment of UTI and systolic CHF.  He was also admitted two other times in the last month for evaluation of hematuria and bladder tumor.        Past Medical failure ([facility] [J96.01] 05/31/2015    Chronic systolic CHF (congestive heart failure) ([facility] [I50.22] 03/04/2015    Hyperlipidemia with target LDL less than 100 [E78.5] 07/12/2013    Coronary artery disease involving native coronary artery of native heart without angina pectoris [I25.10] 05/29/2013       Congestive heart failure: Ejection fraction 25%  -  Per cardiology    Bladder  cancer status post chemotherapy and surgery:  -  Per hematology oncology and urology    Symptom Management:  SOB - PRN morphine    Palliative Care Encounter/Recommendations:  No family at bedside. We'll provide treatment for shortness of breath. We'll follow-up with family and hospice. Code Status: limited code- no intubation, CPR, defibrillation    Surrogate/Legal NOK: Spouse    Prognosis: unknown      Discharge planning: to be determined    Patient meets criteria for general inpatient hospice care including the following: N/A- Palliative Care Patient    Data in Support of Terminal Illness:N/A Palliative Patient. Discussed patient and the plan of care with the other IDT members of Palliative Care, Dr. Chikis De Anda and Dr. Gerson Craig, and with patient, family and floor nurse. Referrals to: none today    Time/Communication  Greater than 51% of time spent, total 25 minutes in counseling and coordination of care at the bedside regarding goals of care, symptom management, diagnosis and prognosis and see above. Keysha Santos PA-C  Palliative Medicine    Thank you for allowing Palliative Medicine to participate in the care of [facility] (DP/SNF). Note: This report was completed using computerize voiced recognition software. Every effort has been made to ensure accuracy; however, inadvertent computerized transcription errors may be present.

## 2018-12-06 NOTE — PROGRESS NOTES
Date: 12/6/2018    Time: 12:37 AM    Patient Placed On BIPAP/CPAP/ Non-Invasive Ventilation? Yes    If no must comment. Facial area red/color change? No           If YES are Blister/Lesion present? No   If yes must notify nursing staff  BIPAP/CPAP skin barrier? Yes    Skin barrier type:Liquicel       Comments:         Gena Shoemaker

## 2018-12-07 NOTE — PLAN OF CARE
Problem: Falls - Risk of:  Goal: Will remain free from falls  Will remain free from falls   Outcome: Not Met This Shift    Goal: Absence of physical injury  Absence of physical injury   Outcome: Not Met This Shift      Problem: Risk for Impaired Skin Integrity  Goal: Tissue integrity - skin and mucous membranes  Structural intactness and normal physiological function of skin and  mucous membranes.    Outcome: Met This Shift      Problem: Discharge Planning:  Goal: Participates in care planning  Participates in care planning   Outcome: Not Met This Shift    Goal: Discharged to appropriate level of care  Discharged to appropriate level of care   Outcome: Not Met This Shift      Problem: Airway Clearance - Ineffective:  Goal: Ability to maintain a clear airway will improve  Ability to maintain a clear airway will improve   Outcome: Not Met This Shift      Problem: Skin Integrity - Impaired:  Goal: Will show no infection signs and symptoms  Will show no infection signs and symptoms   Outcome: Met This Shift    Goal: Absence of new skin breakdown  Absence of new skin breakdown   Outcome: Met This Shift

## 2018-12-07 NOTE — PROGRESS NOTES
Subjective: The patient is awake and alert. Remains confused but pleasant this AM.  No acute events overnight per nursing. He currently appears well and denies any dyspnea or pain. BP stable off pressors. Objective:    /63   Pulse 79   Temp 97.5 °F (36.4 °C) (Oral)   Resp 28   Ht 5' 9\" (1.753 m)   Wt 194 lb 3.6 oz (88.1 kg)   SpO2 100%   BMI 28.68 kg/m²     Current medications that patient is taking have been reviewed. Heart:  RRR, no murmurs, gallops, or rubs.   Lungs:  Diminished but clear bilaterally, no wheeze, rales or rhonchi  Abd: bowel sounds present, soft, nontender, nondistended, no masses  Extrem:  No clubbing, cyanosis, or edema    CBC:   Lab Results   Component Value Date    WBC 13.4 12/07/2018    RBC 3.26 12/07/2018    HGB 8.4 12/07/2018    HCT 29.5 12/07/2018    MCV 90.5 12/07/2018    MCH 25.8 12/07/2018    MCHC 28.5 12/07/2018    RDW 17.9 12/07/2018     12/07/2018    MPV 12.7 12/07/2018     BMP:    Lab Results   Component Value Date     12/07/2018    K 4.0 12/07/2018     12/07/2018    CO2 27 12/07/2018    BUN 29 12/07/2018    CREATININE 1.3 12/07/2018    CALCIUM 8.4 12/07/2018    GFRAA >60 12/07/2018    LABGLOM 53 12/07/2018    GLUCOSE 191 12/07/2018        PT/INR:    Lab Results   Component Value Date    PROTIME 20.4 12/07/2018    INR 1.8 12/07/2018       Monitor-sinus      Assessment:  Patient Active Problem List   Diagnosis    Sepsis with septic shock likely due to urinary source and pneumonia-improved    Acute hypoxemic respiratory failure-improved    Coronary artery disease involving native coronary artery     Cardiac resynchronization therapy defibrillator (CRT-D) in place    Hyperlipidemia, mixed    Permanent atrial fibrillation-currently paced, INR remains sub therapeutic this AM    Type 2 diabetes mellitus uncontrolled    CKD (chronic kidney disease) stage 3 with superimposed DANA-creatinine back to baseline    Invasive carcinoma of urinary

## 2018-12-07 NOTE — PROGRESS NOTES
CC- shock    Subjective: The patient is awake and alert. Tolerating medications. Reports no side effects. Afebrile. For transfer out of MICU  10 ROS otherwise negative unless otherwise specified above. Objective:    Vitals:    12/07/18 1210   BP:    Pulse:    Resp:    Temp:    SpO2: 100%       General Appearance:    Awake, alert , no acute distress.    HEENT:    Normocephalic,PERRL,neck supple, no JVD, mucosa moist, no thrush   Lungs:     Clear to auscultation bilaterally, no wheeze , crackles   Heart:    Regular rate and rhythm, no murmur   Abdomen:     Soft, non-tender, not distended  bowel sounds present,   Extremities:   No edema,no open wound,no erythema, non  tender   Skin:   no rashes or lesions   CBC+dif:  Reviewed and trend followed     Radiology:  Noted     Microbiology:  UC- mixed GPC    Assessment:  · Septic shock likely urologic source  · History of bladder cancer with chronic hydronephrosis  · lactic acidosis -resolved  · BPH h/o TURP 2016     Plan:    · Cont IV Zosyn and and micafungin day 2  · Blood cultures  · Monitor labs  · Will follow with you            Electronically signed by Alistair Chawla MD on 12/7/2018 at 12:17 PM

## 2018-12-07 NOTE — PATIENT CARE CONFERENCE
Intensive Care Daily Quality Rounding Checklist      ICU Team Members: Bedside Nurse, Nursing Unit Leadership and Dr. Ani Mann    ICU Day #: NUMBER: 2    Intubation Date:  n/a    Ventilator Day #: N/A    Central Line Insertion Date: December 5        Day #: 1NUMBER: 2     Arterial Line Insertion Date: N/A        DVT Prophylaxis: yes    GI Prophylaxis: yes    Skin Issues/ Wounds and ordered treatment discussed on rounds: N    Goals/ Plans for the Day: Transfer IM

## 2018-12-07 NOTE — PROGRESS NOTES
[] Vasopressin       [] Dobutamine  [] Phenylephrine         [] Epinephrine    Central line:     [] No   [x] Yes         If yes -       [] Right IJ     [] Left IJ [x] Right Femoral [] Left Femoral                   [] Right Subclavian [] Left Subclavian       Gray catheter:   [] No   [x] Yes     Urine output:            [x] Good   [] Low              [] Anuric      Objective:     Vital signs:  /63   Pulse 79   Temp 97.5 °F (36.4 °C) (Oral)   Resp 28   Ht 5' 9\" (1.753 m)   Wt 194 lb 3.6 oz (88.1 kg)   SpO2 100%   BMI 28.68 kg/m²   Tmax over 24 hours:  Temp (24hrs), Av °F (36.7 °C), Min:97.5 °F (36.4 °C), Max:98.4 °F (36.9 °C)      Patient Vitals for the past 6 hrs:   BP Temp Temp src Pulse Resp SpO2   18 0600 135/63 - - 79 28 100 %   18 0544 135/63 - - 74 26 100 %   18 0500 (!) 144/69 - - 74 28 100 %   18 0400 138/69 97.5 °F (36.4 °C) Oral 74 23 95 %   18 0300 (!) 149/74 - - 76 24 99 %   18 0200 125/70 - - 74 (!) 36 99 %   18 0157 125/70 98.3 °F (36.8 °C) - 75 (!) 40 -         Intake/Output Summary (Last 24 hours) at 18 0749  Last data filed at 18 0544   Gross per 24 hour   Intake          1843.33 ml   Output             3495 ml   Net         -1651.67 ml     Wt Readings from Last 2 Encounters:   18 194 lb 3.6 oz (88.1 kg)   18 193 lb 3.2 oz (87.6 kg)     Body mass index is 28.68 kg/m². Physical examination:     General: No distress   Eyes: PERRL. No sclera icterus. No conjunctival injection. ENT: No discharge. Pharynx clear. Neck: Trachea midline. Normal thyroid. Resp: Bilateral basilar rhonchi. CV: Regular rate. Regular rhythm. No murmur or rub. Abd: Non-tender. Non-distended. No masses. No organmegaly. Normal bowel sounds. Skin: Warm and dry. No nodules on exposed extremities. No rash on exposed extremities. Ext: No cyanosis, clubbing, edema  Lymph: No cervical LAD. No supraclavicular LAD. M/S: No cyanosis.  No APTT 30.5 11/14/2018       Comprehensive Metabolic Profile:   Recent Labs      12/05/18   0209   12/05/18   1600  12/06/18   0523  12/07/18   0520   NA  142   < >  141  143  144   K  6.0*   < >  4.7  4.4  4.0   CL  97*   < >  105  107  105   CO2  30*   < >  28  26  27   BUN  54*   < >  48*  42*  29*   CREATININE  2.1*   < >  1.8*  1.5*  1.3*   GLUCOSE  175*   < >  88  231*  191*   CALCIUM  9.6   < >  8.8  8.5*  8.4*   PROT  7.2   --    --    --    --    LABALBU  3.3*   --    --    --    --    BILITOT  0.3   --    --    --    --    ALKPHOS  88   --    --    --    --    AST  20   --    --    --    --    ALT  23   --    --    --    --     < > = values in this interval not displayed. Magnesium:   Lab Results   Component Value Date    MG 2.0 12/07/2018     Phosphorus:   Lab Results   Component Value Date    PHOS 2.6 12/07/2018     Ionized Calcium: No results found for: CAION     Urinalysis:     Troponin:   Recent Labs      12/05/18   0209   TROPONINI  0.03       Microbiology past 24h:    Blood cultures:                 [] None drawn [x] Pending      [] Negative             []  Positive (Details:  )  Urine Culture:                   [] None drawn [x] Pending      [] Negative             []  Positive (Details:  )  Sputum Culture:               [] None drawn [] Pending       [] Negative             []  Positive (Details:  )   Endotracheal aspirate:     [] None drawn [] Pending       [] Negative             []  Positive (Details:  )     Other Pertinent Labs and Pathology Results:   1. Radiology/Imaging:     CXR :  12/6/18  Impression   CHF without significant change. CT:  12/5/18  Chest CT wo contrast  Impression   ALERT:  THIS IS AN ABNORMAL REPORT. ALERT:  THIS IS AN   ABNORMAL REPORT. Findings communicated directly with Dr. Elizabeth Corrales on   12/5/2018 at approximately 1932 hours. 1. Bibasilar infiltrate/pneumonia, left greater than right.  There are   multiple areas of hyperdensity noted in these regions of

## 2018-12-07 NOTE — PROGRESS NOTES
Pt transferred from ICU approximately 1245. Gray and IV in right AC was removed prior to transfer but not charted as so. Removed from flowsheet and corrected LDA assessments.

## 2018-12-07 NOTE — PROGRESS NOTES
01/19/2017    CKMB 4.3 10/06/2015    TROPONINI 0.03 12/05/2018    TROPONINI 0.03 11/23/2018    TROPONINI 0.02 10/09/2018      PT/INR:    Lab Results   Component Value Date    PROTIME 20.4 12/07/2018    INR 1.8 12/07/2018     TSH:    Lab Results   Component Value Date    TSH 3.550 11/13/2017       Telemetry: Paced rhythm, NSVT    Echocardiogram: 3/6/15  Limited echocardiogram performed in order to reassess LV function. Severly dilated left ventricle. Severely reduced left ventricular systolic function. Ejection fraction is visually estimated at 25-30%. There is doppler evidence of stage III diastolic dysfunction. Echocardiogram: 9/16/16   Ejection fraction is visually estimated at 25-30%.   Severe global hypokinesis   Stage III diastolic dysfunction   The left atrium is mild-moderately dilated.   Increased E point septal separation noted,suggesting decreased LV cardiac output   Mild to moderate mitral regurgitation with central jet   The aortic valve appears mildly sclerotic.   Pulmonary hypertension is mild. [facility]: 9/16/16  1. No evidence of pharmacologic stress-induced myocardial ischemia. The findings are most consistent with a nonischemic cardiomyopathy.       2. Severe global hypokinesis.       3. Severe left ventriculomegaly. The left ventricular ejection   fraction is 24%. Limited Echocardiogram: 8/26/2017  Left Ventricle   Severely dilated left ventricular chamber. Severe left ventricular   systolic dysfunction. Global hypokinesis with regional wall motion   variation. LVEF is 15%.      Right Ventricle   Normal right ventricular chamber size. Normal right ventricular function.  ICD noted in right ventricle.      Left Atrium   Mildly enlarged left atrium based on visual estimates.      Right Atrium   Normal right atrium. ICD or pacer lead noted.      Mitral Valve   Mild mitral valve leaflet thickening.  Normal mitral leaflet mobility.   Moderate mitral annular in 1/2017.  5. Chronic anticoagulation with coumadin -- INR 8.1 on admission --> coagulopathy reversed --> most recent INR 1.4  6. Ischemic cardiomyopathy (EF 45% in 7/2013) / BiV ICD in place --> EF 25-30% in 9/2016 --> EF 15% in 8/2017 and 25% in 11/2017   7. CAD s/p CABG in 2005, negative stress test in 2012 and 9/16/16 -- results of 8/2017 cardiac catheterization outlined above  8. CHRISTINE - compliant with treatment  9. Acute on CKD -- improved  10. History of HTN  11. Anemia -- most recent Hgb 10.7 --> 8.0 --> 8.4  12. History of recurrent VT storm (on mexiletine prior to admission) -- NSVT noted on telemetry  11.  Invasive bladder cancer s/p chemo & surgery -- follows with heme/onc and urology    - GDMT held d/t hypotension  (Toprol XL, hydralazine/nitrate) --> will restart Toprol XL at lower dose and up-titrate as able  - Mexiletine resumed 12/6/18  - PRBC's PRN  - Volume management per renal  - Prior cardiac studies reviewed / no additional cardiac testing ordered  - Supportive care  - Palliative care/hospice following  - Case discussed with the patient's wife and son on 12/5/18      Isabella Hernadez MD  [facility]) Cardiology

## 2018-12-07 NOTE — PROGRESS NOTES
Date: 12/6/2018    Time: 8:30 PM    Patient Placed On BIPAP/CPAP/ Non-Invasive Ventilation? Yes    If no must comment. Facial area red/color change? No           If YES are Blister/Lesion present? No   If yes must notify nursing staff  BIPAP/CPAP skin barrier? Yes    Skin barrier type:Liquicel       Comments:         Gena Shoemaker

## 2018-12-07 NOTE — PROGRESS NOTES
JVD  Cardiovascular: S1, S2 regular rhythm, no murmur,or rub  Respiratory:  No crackles, no wheeze  Gastrointestinal:  Soft, nontender, nondistended, NABS  Ext: no edema, feet warm  Skin: dry, no rash  Neuro: awake, alert, interactive      DATA:    Recent Labs      12/05/18   0209  12/06/18 0523   WBC  29.3*  14.6*   HGB  10.7*  8.0*   HCT  37.5  28.0*   MCV  91.7  91.2   PLT  373  210     Recent Labs      12/05/18   0209  12/05/18   0603  12/05/18   1600  12/06/18   0523   NA  142  141  141  143   K  6.0*  4.7  4.7  4.4   CL  97*  102  105  107   CO2  30*  29  28  26   MG   --    --   2.3  2.1   PHOS   --   3.0   --   3.1   BUN  54*  55*  48*  42*   CREATININE  2.1*  2.2*  1.8*  1.5*   ALT  23   --    --    --    AST  20   --    --    --    BILITOT  0.3   --    --    --    ALKPHOS  88   --    --    --        Lab Results   Component Value Date    LABPROT 1.9 (H) 12/05/2018    LABPROT 1.9 12/05/2018    LABPROT 3.3 (H) 12/02/2014       ASSESSMENT / RECOMMENDATIONS:    Briefly, 69 y.o. y.o. gentleman with complicated comorbidities, admitted with acute mental status change, dyspnea, hypoxemia, hypotension.     1. Acute kidney injury superimposed on chronic kidney disease. Hemodynamically-mediated due to hypotension, volume contraction. Nonoliguric. UO has picked up somewhat with improvement in blood pressure and initiation of IV fluid resuscitation     2. Chronic kidney disease is a creatinine 1.4-1.6 mg/dL     3. Anemia at least in part secondary to chronic kidney disease, in part due to recent phlebotomy associated with recurrent admissions     4.  Bladder cancer, history of urinary retention.   Has Gray catheter in place.     Renal function continues to improve  Good UO  Continue IV fluid resuscitation  Follow labs, UO  Avoid nephrotoxins  See dictation, orders         Electronically signed by Cierra Kumari MD on 12/6/2018

## 2018-12-08 NOTE — PROGRESS NOTES
this exam, an underlying malignancy is   not excluded, further evaluation with either a dedicated   retroperitoneal renal ultrasound or CT the abdomen and pelvis with   consideration given to patient's renal status, is recommended. 6. Moderate to severe left and moderate to moderately severe right   hydronephrosis is suspected but incompletely imaged on this study. Bilateral renal cortical atrophy. A suspected large left renal   calculus as measured above is thought to be present but incompletely   imaged on this exam, be further evaluated with a dedicated   retroperitoneal renal ultrasound. 7. Multiple pulmonary nodules are noted as described above, short-term   CT follow-up within 2-3 weeks is recommended. US RETROPERITONEAL COMPLETE   Final Result   Moderate right hydronephrosis. Cortical thinning of the left kidney. Asymmetric kidney size, left smaller than right kidney. The exam has been dictated and signed by Shree Jessica PA-C. Report   is not final unless authenticated by radiologist.    Abby Starkey MD, Radiologist, have reviewed the images and   report and concur with these findings. XR CHEST PORTABLE   Final Result   Tortuous ectatic aorta   Cardiomegaly    Airspace disease compatible with atelectasis or pneumonia   at the left lung base   There is a suggestion of a left pleural effusion   The chest is improved in the interval                Assessment:  1. Shock, possibly septic  2. Respiratory failure on Bipap  3. CHF    Plan:  1. Continue bipap, When necessary and daily at bedtime  2. Treat CHF  3. Aerosols    Care reviewed with the staff and the patient's family as available. Please note that voice recognition technology was used in the preparation of this note and make therefore it may contain inadvertent transcription errors. Dyana Del Toro M.D., F.C.C.P.     [facility], [facility]

## 2018-12-08 NOTE — PROGRESS NOTES
CC- shock    Subjective: The patient is awake and alert. Tolerating medications. Reports no side effects. Afebrile. 10 ROS otherwise negative unless otherwise specified above. Objective:    Vitals:    12/08/18 0926   BP:    Pulse:    Resp: 24   Temp:    SpO2: 98%       General Appearance:    Awake, alert , no acute distress. HEENT:    Normocephalic,PERRL,neck supple, no JVD, mucosa moist, no thrush   Lungs:     Clear to auscultation bilaterally, no wheeze , crackles   Heart:    Regular rate and rhythm, no murmur   Abdomen:     Soft, non-tender, not distended  bowel sounds present,   Extremities:   No edema,no open wound,no erythema, non  tender   Skin:   no rashes or lesions   CBC+dif:  Reviewed and trend followed     Radiology:  Noted     Microbiology:  UC- mixed GPC  wnd cx-MRSA    Assessment:  · Septic shock likely urologic source  · History of bladder cancer with chronic hydronephrosis  · lactic acidosis -resolved  · BPH h/o TURP 2016     Plan:    · Cont IV Zosyn day 3  · dc micafungin   · Blood cultures  · Monitor labs  · Will follow with you    Electronically signed by MOISÉS Avalos on 12/8/2018 at 11:29 AM   I had a face-to-face encounter with the patient at the bedside. I agree with the nurse practitioner's note and assessment and plan as detailed above. Necessary editing and changes made to the note by myself.     Sean CORNELL

## 2018-12-09 NOTE — PROGRESS NOTES
Follow up: spoke with charge nurse. Plan continues for patient to be discharged skilled to [facility]. Hospice is not providing any care for this patient. Please call [phone] with any questions.

## 2018-12-09 NOTE — PROGRESS NOTES
Subjective: The patient is awake and alert. No acute events   At baseline confusion  Up in bed denies complaints  No chills. , no hematuria     Objective:    BP (!) 140/94   Pulse 75   Temp 97.6 °F (36.4 °C) (Oral)   Resp 16   Ht 5' 9\" (1.753 m)   Wt 200 lb (90.7 kg)   SpO2 99%   BMI 29.53 kg/m²     Current medications that patient is taking have been reviewed. Heart:  RRR, no murmurs, gallops, or rubs.   Lungs:  Diminished but clear bilaterally, no wheeze, rales or rhonchi  Abd: bowel sounds present, soft, nontender, nondistended, no masses  Extrem:  No clubbing, cyanosis, or edema    CBC:   Lab Results   Component Value Date    WBC 13.4 12/07/2018    RBC 3.26 12/07/2018    HGB 8.4 12/07/2018    HCT 29.5 12/07/2018    MCV 90.5 12/07/2018    MCH 25.8 12/07/2018    MCHC 28.5 12/07/2018    RDW 17.9 12/07/2018     12/07/2018    MPV 12.7 12/07/2018     BMP:    Lab Results   Component Value Date     12/07/2018    K 4.0 12/07/2018     12/07/2018    CO2 27 12/07/2018    BUN 29 12/07/2018    CREATININE 1.3 12/07/2018    CALCIUM 8.4 12/07/2018    GFRAA >60 12/07/2018    LABGLOM 53 12/07/2018    GLUCOSE 191 12/07/2018        PT/INR:    Lab Results   Component Value Date    PROTIME 49.5 12/09/2018    INR 4.4 12/09/2018       Monitor-sinus      Assessment:  Patient Active Problem List   Diagnosis    Sepsis with septic shock likely due to urinary source and pneumonia-improved    Acute hypoxemic respiratory failure-improved    Coronary artery disease involving native coronary artery     Cardiac resynchronization therapy defibrillator (CRT-D) in place    Hyperlipidemia, mixed    Permanent atrial fibrillation-currently paced, INR remains sub therapeutic this AM    Type 2 diabetes mellitus uncontrolled    CKD (chronic kidney disease) stage 3 with superimposed DANA-creatinine back to baseline    Invasive carcinoma of urinary bladder with hydronephrosis    Chronic systolic CHF (congestive heart

## 2018-12-09 NOTE — PROGRESS NOTES
tolerated          Thanks for letting us see this patient in consultation. Please contact us with any questions. Office [phone] or after hours through [facility], x [phone].

## 2018-12-10 NOTE — PROGRESS NOTES
Subjective: The patient is awake and alert. No problems overnight. Denies chest pain, angina. Breathing better. Denies abdominal pain. Tolerating diet. No nausea or vomiting. Objective:    /62   Pulse 75   Temp 98.1 °F (36.7 °C) (Oral)   Resp 20   Ht 5' 9\" (1.753 m)   Wt 200 lb 4 oz (90.8 kg)   SpO2 99%   BMI 29.57 kg/m²     Current medications that patient is taking have been reviewed. Heart:  RRR, no murmurs, gallops, or rubs.   Lungs:  CTA bilaterally, no wheeze, rales or rhonchi  Abd: bowel sounds present, nontender, nondistended, no masses  Extrem:  No clubbing, cyanosis, or edema    CBC with Differential:    Lab Results   Component Value Date    WBC 13.4 12/07/2018    RBC 3.26 12/07/2018    HGB 8.4 12/07/2018    HCT 29.5 12/07/2018     12/07/2018    MCV 90.5 12/07/2018    MCH 25.8 12/07/2018    MCHC 28.5 12/07/2018    RDW 17.9 12/07/2018    SEGSPCT 72 07/15/2013    BANDSPCT 3 03/18/2016    LYMPHOPCT 8.9 12/06/2018    MONOPCT 6.1 12/06/2018    MYELOPCT 5 05/28/2015    BASOPCT 0.3 12/06/2018    MONOSABS 0.89 12/06/2018    LYMPHSABS 1.31 12/06/2018    EOSABS 0.90 12/06/2018    BASOSABS 0.04 12/06/2018     CMP:    Lab Results   Component Value Date     12/10/2018    K 3.7 12/10/2018    K 4.7 11/23/2018     12/10/2018    CO2 26 12/10/2018    BUN 17 12/10/2018    CREATININE 1.1 12/10/2018    GFRAA >60 12/10/2018    LABGLOM >60 12/10/2018    GLUCOSE 151 12/10/2018    PROT 7.2 12/05/2018    LABALBU 3.3 12/05/2018    CALCIUM 8.2 12/10/2018    BILITOT 0.3 12/05/2018    ALKPHOS 88 12/05/2018    AST 20 12/05/2018    ALT 23 12/05/2018     BMP:    Lab Results   Component Value Date     12/10/2018    K 3.7 12/10/2018    K 4.7 11/23/2018     12/10/2018    CO2 26 12/10/2018    BUN 17 12/10/2018    LABALBU 3.3 12/05/2018    CREATININE 1.1 12/10/2018    CALCIUM 8.2 12/10/2018    GFRAA >60 12/10/2018    LABGLOM >60 12/10/2018    GLUCOSE 151 12/10/2018     Magnesium:    Lab

## 2018-12-10 NOTE — PATIENT CARE CONFERENCE
[facility] Quality Flow/Interdisciplinary Rounds Progress Note        Quality Flow Rounds held on December 10, 2018    Disciplines Attending:  Bedside Nurse, ,  and Nursing Unit Leadership    Erna Fonseca was admitted on 12/5/2018  1:48 AM    Anticipated Discharge Date:  Expected Discharge Date: 12/12/18    Disposition:    Fabian Score:  Fabian Scale Score: 16    Readmission Risk              Risk of Unplanned Readmission:        44           Discussed patient goal for the day, patient clinical progression, and barriers to discharge.   The following Goal(s) of the Day/Commitment(s) have been identified:  Occupational Therapy - Obtain Consult Order and Physical Therapy - Obtain Consult Order      Sai Vivar  December 10, 2018

## 2018-12-11 NOTE — PLAN OF CARE
Problem: Falls - Risk of:  Goal: Will remain free from falls  Will remain free from falls   Outcome: Met This Shift      Problem: Skin Integrity - Impaired:  Goal: Absence of new skin breakdown  Absence of new skin breakdown   Outcome: Met This Shift

## 2018-12-11 NOTE — PROGRESS NOTES
CC- shock    Subjective: The patient is awake and alert. Tolerating medications. Reports no side effects. Afebrile. 10 ROS otherwise negative unless otherwise specified above. Objective:    Vitals:    12/11/18 0815   BP: 132/65   Pulse: 75   Resp: 18   Temp: 97.6 °F (36.4 °C)   SpO2: 100%       General Appearance:    Awake, alert , no acute distress.    HEENT:    Normocephalic,PERRL,neck supple, no JVD, mucosa moist, no thrush   Lungs:     Clear to auscultation bilaterally, no wheeze , crackles   Heart:    Regular rate and rhythm, no murmur   Abdomen:     Soft, non-tender, not distended  bowel sounds present,  void   Extremities:   No edema,no open wound,no erythema, non  tender   Skin:   no rashes or lesions   CBC+dif:  Reviewed and trend followed     Radiology:  Noted     Microbiology:  UC- mixed GPC  wnd cx-MRSA  BC- NGTD    Assessment:  · Septic shock likely urologic source- resolved  · History of bladder cancer with chronic hydronephrosis  · lactic acidosis -resolved  · BPH h/o TURP 2016     Plan:    · Cont oral augmentin for 10 days  · Monitor labs  · Will follow with you    Electronically signed by Ayah Temple MD on 12/11/2018 at 10:08 AM

## 2018-12-11 NOTE — CARE COORDINATION
Social Work / Discharge Planning : Patient will be discharged back to [facility] today at 3:30 via "[employer] transport. Patient, spouse Colin Matson from [facility] [employer], charge RN and RN updated on discharge and time of transport.  Electronically signed by SAEID Gonsales on 12/11/2018 at 10:42 AM

## 2018-12-11 NOTE — PROGRESS NOTES
in reach to notify staff when task is finished. Wife present to ensure safety and privacy while pt finished ADL. Therapeutic Exercises:  Pt demoed fair use of B UE during transfers and functional mobility. Other:  Pt is physically able to perform transfers but due to fear pt ceased participation in task. Therapist ensured pt safety but was still apprehensive. Education:  Safe transfer techniques    Equipment Recommendations:  Continue to assess    AM-PAC Inpatient Daily Activity Raw Score:  11/24    Pain Level: No complaints of pain   Additional Notes:  Confusion  Patient has made fair progress during treatment sessions toward set goals. [x] Continue with current OT Plan of care.   [] Prepare for Discharge    Binh [id number]/L [id number]    Total Tx Time: 21

## 2018-12-11 NOTE — PROGRESS NOTES
12/10/18 2106    insulin lispro (HUMALOG) injection vial 0-12 Units  0-12 Units Subcutaneous TID WC Yolanda Luciano MD   4 Units at 12/11/18 0480    insulin lispro (HUMALOG) injection vial 0-6 Units  0-6 Units Subcutaneous Nightly Yolanda Luciano MD   1 Units at 12/10/18 2109    glucose (GLUTOSE) 40 % oral gel 15 g  15 g Oral PRN Yolanda Luciano MD        dextrose 50 % solution 12.5 g  12.5 g Intravenous PRN Yolanda Luciaon MD        glucagon (rDNA) injection 1 mg  1 mg Intramuscular PRN Yolanda Luciano MD        dextrose 5 % solution  100 mL/hr Intravenous PRN Yolanda Luciano MD        enoxaparin (LOVENOX) injection 40 mg  40 mg Subcutaneous Daily Candi Prince DO   40 mg at 12/10/18 0826      dextrose         Physical Exam:  BP (!) 149/73   Pulse 75   Temp 98 °F (36.7 °C) (Oral)   Resp 20   Ht 5' 9\" (1.753 m)   Wt 192 lb 6 oz (87.3 kg)   SpO2 98%   BMI 28.41 kg/m²   Wt Readings from Last 3 Encounters:   12/11/18 192 lb 6 oz (87.3 kg)   11/28/18 193 lb 3.2 oz (87.6 kg)   11/20/18 188 lb (85.3 kg)     Appearance: Awake, alert, no acute respiratory distress  Skin: Intact, no rash  Head: Normocephalic, atraumatic  Eyes: EOMI, no conjunctival erythema  ENMT: No pharyngeal erythema, MMM, no rhinorrhea, has severe hearing impairment. Neck: Supple, no elevated JVP, no carotid bruits  Lungs: has bilateral basal rales without any wheezing. Cardiac: Regular rate and rhythm, +S1S2, no murmurs apparent  Abdomen: Soft, nontender, +bowel sounds  Extremities: Moves all extremities x 4, no lower extremity edema, has dressings over both legs.    Neurologic: No focal motor deficits apparent, normal mood and affect  Peripheral Pulses: Intact posterior tibial pulses bilaterally    Intake/Output:    Intake/Output Summary (Last 24 hours) at 12/11/18 0819  Last data filed at 12/11/18 0547   Gross per 24 hour   Intake              940 ml   Output                0 ml   Net              940 ml     No intake/output data recorded. Laboratory Tests:  Recent Labs      12/09/18   0320  12/10/18   0325  12/11/18   0501   NA  143  140  136   K  4.1  3.7  4.5   CL  102  104  100   CO2  27  26  24   BUN  22  17  20   CREATININE  1.2  1.1  1.3*   GLUCOSE  237*  151*  250*   CALCIUM  8.7  8.2*  8.7     Lab Results   Component Value Date    MG 2.0 12/07/2018     No results for input(s): ALKPHOS, ALT, AST, PROT, BILITOT, BILIDIR, LABALBU in the last 72 hours. No results for input(s): WBC, RBC, HGB, HCT, MCV, MCH, MCHC, RDW, PLT, MPV in the last 72 hours. Lab Results   Component Value Date    CKTOTAL 22 10/09/2018    CKMB 2.0 01/19/2017    TROPONINI 0.03 12/05/2018    TROPONINI 0.03 11/23/2018    TROPONINI 0.02 10/09/2018     Lab Results   Component Value Date    INR 3.8 12/11/2018    INR 4.0 12/10/2018    INR 4.4 12/09/2018    PROTIME 42.7 (H) 12/11/2018    PROTIME 45.4 (H) 12/10/2018    PROTIME 49.5 (H) 12/09/2018     Lab Results   Component Value Date    TSH 3.550 11/13/2017     Lab Results   Component Value Date    LABA1C 7.1 (H) 11/24/2018     No results found for: EAG  Lab Results   Component Value Date    CHOL 139 11/14/2017    CHOL 77 12/03/2014    CHOL 107 04/06/2014     Lab Results   Component Value Date    TRIG 81 11/14/2017    TRIG 74 12/03/2014    TRIG 38 04/06/2014     Lab Results   Component Value Date    HDL 37 11/14/2017    HDL 23 12/03/2014    HDL 46 04/06/2014     Lab Results   Component Value Date    LDLCALC 86 11/14/2017    LDLCALC 39 12/03/2014    LDLCALC 53 04/06/2014     Lab Results   Component Value Date    LABVLDL 16 11/14/2017    LABVLDL 15 12/03/2014    LABVLDL 8 04/06/2014     No results found for: CHOLHDLRATIO    Cardiac Tests:  Echocardiogram: 3/6/15  Limited echocardiogram performed in order to reassess LV function. Severly dilated left ventricle. Severely reduced left ventricular systolic function. Ejection fraction is visually estimated at 25-30%.   There is doppler evidence of stage III diastolic

## 2018-12-23 PROBLEM — I50.9 CHF WITH UNKNOWN LVEF (HCC): Status: ACTIVE | Noted: 2018-01-01

## 2019-01-05 LAB
BLOOD CULTURE, ROUTINE: NORMAL
CULTURE, BLOOD 2: NORMAL

## (undated) DEVICE — 4-PORT MANIFOLD: Brand: NEPTUNE 2

## (undated) DEVICE — MEDIA CONTRAST ISOVUE  300 10X50ML

## (undated) DEVICE — Z INACTIVE USE 2660664 SOLUTION IRRIG 3000ML 0.9% SOD CHL USP UROMATIC PLAS CONT

## (undated) DEVICE — SOLUTION IV IRRIG WATER 1000ML POUR BRL 2F7114

## (undated) DEVICE — Z INACTIVE USE 2635503 SOLUTION IRRIG 3000ML ST H2O USP UROMATIC PLAS CONT

## (undated) DEVICE — GAUZE,SPONGE,4"X4",8PLY,STRL,LF,10/TRAY: Brand: MEDLINE

## (undated) DEVICE — CATHETER URET 5FR L70CM OPN END SGL LUMN INJ HUB FLEXIMA

## (undated) DEVICE — SOLUTION SURG PREP ANTIMICROBIAL 4 OZ SKIN WND EXIDINE

## (undated) DEVICE — BAG DRNGE COMB PK

## (undated) DEVICE — DRAINBAG,ANTI-REFLUX TOWER,L/F,2000ML,LL: Brand: MEDLINE

## (undated) DEVICE — TUBING, SUCTION, 3/16" X 12', STRAIGHT: Brand: MEDLINE

## (undated) DEVICE — GAUZE SPONGES,8 PLY: Brand: CURITY

## (undated) DEVICE — CYSTO PACK: Brand: MEDLINE INDUSTRIES, INC.

## (undated) DEVICE — GOWN,SIRUS,FABRNF,XL,20/CS: Brand: MEDLINE

## (undated) DEVICE — Z DUP USE 2139333 GUIDEWIRE UROLOGICAL STR STD 0.035 IN X150 CM REG ZIPWIRE LF

## (undated) DEVICE — HOSE CONN FOR WST MGMT SYS NEPTUNE 2